# Patient Record
Sex: FEMALE | Race: WHITE | NOT HISPANIC OR LATINO | Employment: OTHER | URBAN - NONMETROPOLITAN AREA
[De-identification: names, ages, dates, MRNs, and addresses within clinical notes are randomized per-mention and may not be internally consistent; named-entity substitution may affect disease eponyms.]

---

## 2017-01-09 ENCOUNTER — HOSPITAL ENCOUNTER (OUTPATIENT)
Dept: GENERAL RADIOLOGY | Facility: HOSPITAL | Age: 52
Discharge: HOME | End: 2017-01-09
Attending: INTERNAL MEDICINE

## 2017-01-09 ENCOUNTER — OFFICE VISIT (OUTPATIENT)
Dept: FAMILY MEDICINE CLINIC | Facility: CLINIC | Age: 52
End: 2017-01-09

## 2017-01-09 VITALS
TEMPERATURE: 98.1 F | SYSTOLIC BLOOD PRESSURE: 106 MMHG | OXYGEN SATURATION: 100 % | RESPIRATION RATE: 15 BRPM | DIASTOLIC BLOOD PRESSURE: 64 MMHG | BODY MASS INDEX: 22.53 KG/M2 | WEIGHT: 131.25 LBS | HEART RATE: 68 BPM

## 2017-01-09 DIAGNOSIS — R73.01 IMPAIRED FASTING GLUCOSE: Primary | ICD-10-CM

## 2017-01-09 DIAGNOSIS — R53.83 MALAISE AND FATIGUE: ICD-10-CM

## 2017-01-09 DIAGNOSIS — G25.81 RESTLESS LEG SYNDROME: ICD-10-CM

## 2017-01-09 DIAGNOSIS — R53.81 MALAISE AND FATIGUE: ICD-10-CM

## 2017-01-09 DIAGNOSIS — F41.9 ANXIETY: ICD-10-CM

## 2017-01-09 DIAGNOSIS — E53.8 VITAMIN B12 DEFICIENCY: ICD-10-CM

## 2017-01-09 DIAGNOSIS — F33.0 MILD EPISODE OF RECURRENT MAJOR DEPRESSIVE DISORDER (HCC): ICD-10-CM

## 2017-01-09 DIAGNOSIS — D51.0 PERNICIOUS ANEMIA: ICD-10-CM

## 2017-01-09 LAB
ALBUMIN SERPL-MCNC: 4.3 G/DL (ref 3.2–4.8)
ALBUMIN/GLOB SERPL: 1.5 G/DL (ref 1.5–2.5)
ALP SERPL-CCNC: 51 U/L (ref 25–100)
ALT SERPL W P-5'-P-CCNC: 12 U/L (ref 7–40)
ANION GAP SERPL CALCULATED.3IONS-SCNC: 10 MMOL/L (ref 3–11)
AST SERPL-CCNC: 20 U/L (ref 0–33)
BASOPHILS # BLD AUTO: 0.01 10*3/MM3 (ref 0–0.2)
BASOPHILS NFR BLD AUTO: 0.2 % (ref 0–1)
BILIRUB SERPL-MCNC: 0.2 MG/DL (ref 0.3–1.2)
BUN BLD-MCNC: 22 MG/DL (ref 9–23)
BUN/CREAT SERPL: 31.4 (ref 7–25)
CALCIUM SPEC-SCNC: 9.8 MG/DL (ref 8.7–10.4)
CHLORIDE SERPL-SCNC: 106 MMOL/L (ref 99–109)
CO2 SERPL-SCNC: 26 MMOL/L (ref 20–31)
CREAT BLD-MCNC: 0.7 MG/DL (ref 0.6–1.3)
DEPRECATED RDW RBC AUTO: 45.3 FL (ref 37–54)
EOSINOPHIL # BLD AUTO: 0.19 10*3/MM3 (ref 0.1–0.3)
EOSINOPHIL NFR BLD AUTO: 4.1 % (ref 0–3)
ERYTHROCYTE [DISTWIDTH] IN BLOOD BY AUTOMATED COUNT: 12.8 % (ref 11.3–14.5)
GFR SERPL CREATININE-BSD FRML MDRD: 89 ML/MIN/1.73
GLOBULIN UR ELPH-MCNC: 2.8 GM/DL
GLUCOSE BLD-MCNC: 88 MG/DL (ref 70–100)
HBA1C MFR BLD: 5.2 % (ref 4.8–5.6)
HCT VFR BLD AUTO: 37.5 % (ref 34.5–44)
HGB BLD-MCNC: 12.9 G/DL (ref 11.5–15.5)
IMM GRANULOCYTES # BLD: 0.01 10*3/MM3 (ref 0–0.03)
IMM GRANULOCYTES NFR BLD: 0.2 % (ref 0–0.6)
LYMPHOCYTES # BLD AUTO: 1.3 10*3/MM3 (ref 0.6–4.8)
LYMPHOCYTES NFR BLD AUTO: 28 % (ref 24–44)
MCH RBC QN AUTO: 33 PG (ref 27–31)
MCHC RBC AUTO-ENTMCNC: 34.4 G/DL (ref 32–36)
MCV RBC AUTO: 95.9 FL (ref 80–99)
MONOCYTES # BLD AUTO: 0.37 10*3/MM3 (ref 0–1)
MONOCYTES NFR BLD AUTO: 8 % (ref 0–12)
NEUTROPHILS # BLD AUTO: 2.76 10*3/MM3 (ref 1.5–8.3)
NEUTROPHILS NFR BLD AUTO: 59.5 % (ref 41–71)
PLATELET # BLD AUTO: 250 10*3/MM3 (ref 150–450)
PMV BLD AUTO: 10.4 FL (ref 6–12)
POTASSIUM BLD-SCNC: 4.7 MMOL/L (ref 3.5–5.5)
PROT SERPL-MCNC: 7.1 G/DL (ref 5.7–8.2)
RBC # BLD AUTO: 3.91 10*6/MM3 (ref 3.89–5.14)
SODIUM BLD-SCNC: 142 MMOL/L (ref 132–146)
TSH SERPL DL<=0.05 MIU/L-ACNC: 1.44 MIU/ML (ref 0.35–5.35)
VIT B12 BLD-MCNC: 449 PG/ML (ref 211–911)
WBC NRBC COR # BLD: 4.64 10*3/MM3 (ref 3.5–10.8)

## 2017-01-09 PROCEDURE — 82607 VITAMIN B-12: CPT | Performed by: INTERNAL MEDICINE

## 2017-01-09 PROCEDURE — 80053 COMPREHEN METABOLIC PANEL: CPT | Performed by: INTERNAL MEDICINE

## 2017-01-09 PROCEDURE — 83036 HEMOGLOBIN GLYCOSYLATED A1C: CPT | Performed by: INTERNAL MEDICINE

## 2017-01-09 PROCEDURE — 85025 COMPLETE CBC W/AUTO DIFF WBC: CPT | Performed by: INTERNAL MEDICINE

## 2017-01-09 PROCEDURE — 99213 OFFICE O/P EST LOW 20 MIN: CPT | Performed by: INTERNAL MEDICINE

## 2017-01-09 PROCEDURE — 96372 THER/PROPH/DIAG INJ SC/IM: CPT | Performed by: INTERNAL MEDICINE

## 2017-01-09 PROCEDURE — 84443 ASSAY THYROID STIM HORMONE: CPT | Performed by: INTERNAL MEDICINE

## 2017-01-09 RX ORDER — PRAMIPEXOLE DIHYDROCHLORIDE 1 MG/1
1 TABLET ORAL NIGHTLY
Qty: 90 TABLET | Refills: 3 | Status: SHIPPED | OUTPATIENT
Start: 2017-01-09 | End: 2017-05-31

## 2017-01-09 RX ADMIN — CYANOCOBALAMIN 1000 MCG: 1000 INJECTION, SOLUTION INTRAMUSCULAR; SUBCUTANEOUS at 09:52

## 2017-01-09 NOTE — MR AVS SNAPSHOT
Ernestina Orellana Mindiughenhauesteban   1/9/2017 8:45 AM   Office Visit    Dept Phone:  [phone]   Encounter #:  [id number]    Provider:  Uma Anne MD   Department:  [facility]                Your Full Care Plan              Today's Medication Changes          These changes are accurate as of: 1/9/17 11:08 AM.  If you have any questions, ask your nurse or doctor.               Medication(s)that have changed:     pramipexole 1 MG tablet   Commonly known as:  MIRAPEX   Take 1 tablet by mouth Every Night.   What changed:    - medication strength  - how much to take   Changed by:  Uma Anne MD            Where to Get Your Medications      These medications were sent to [facility] [place] [address] - [address] [facility] - [phone]  - [phone] [address] [address]     Phone:  [phone]     pramipexole 1 MG tablet                  Your Updated Medication List          This list is accurate as of: 1/9/17 11:08 AM.  Always use your most recent med list.                FLUoxetine 40 MG capsule   Commonly known as:  PROzac   Take 1 capsule by mouth Daily.       pramipexole 1 MG tablet   Commonly known as:  MIRAPEX   Take 1 tablet by mouth Every Night.               We Performed the Following     CBC & Differential     CBC Auto Differential     Comprehensive Metabolic Panel     Hemoglobin A1c     TSH     Vitamin B12       You Were Diagnosed With        Codes Comments    Impaired fasting glucose    -  Primary ICD-10-CM: R73.01  ICD-9-CM: 790.21     Malaise and fatigue     ICD-10-CM: R53.81, R53.83  ICD-9-CM: 780.79     Restless leg syndrome     ICD-10-CM: G25.81  ICD-9-CM: 333.94     Pernicious anemia     ICD-10-CM: D51.0  ICD-9-CM: 281.0     Vitamin B12 deficiency     ICD-10-CM: E53.8  ICD-9-CM: 266.2       Medications to be Given to You by a Medical Professional     Due       Frequency    (none) cyanocobalamin  injection 1,000 mcg  Every 28 Days      Instructions     None    Patient Instructions History      Upcoming Appointments     Visit Type Date Time Department    OFFICE VISIT 1/9/2017  8:45 AM E  [facility]    PHYSICAL 11/9/2017  1:00 PM [facility]LL[facility]      MyChart Signup     Our records indicate that your [facility] [employer] account has been deactivated. If you would like to reactivate your account, please email [email or web address] or call [phone] to talk to our [employer] staff.             Other Info from Your Visit           Your Appointments     Nov 09, 2017  1:00 PM EST   Physical with Uma Anne MD   [facility] (--)    [address] [address] [phone]           Arrive 15 minutes prior to appointment.              Allergies     Buspar [Buspirone]      Compazine [Prochlorperazine]        Reason for Visit     Anxiety     Depression     Restless Legs Syndrome           Vital Signs     Blood Pressure Pulse Temperature Respirations Weight Oxygen Saturation    106/64 68 98.1 °F (36.7 °C) (Oral) 15 131 lb 4 oz (59.5 kg) 100%    Body Mass Index Smoking Status                22.53 kg/m2 Never Smoker          Problems and Diagnoses Noted     Pernicious anemia    Vitamin B12 deficiency    Increased fasting blood sugar    -  Primary    Malaise and fatigue        Restless leg syndrome          Medications Administered     cyanocobalamin injection 1,000 mcg                    Results

## 2017-01-09 NOTE — PROGRESS NOTES
Subjective   Ernestina Medina is a 51 y.o. female.     Chief Complaint   Patient presents with   • Anxiety   • Depression   • Restless Legs Syndrome       History of Present Illness   Patient is here to follow-up on her sugar   She is fasting for lab work.  She is also to follow-up on anxiety and depression.  She is taking medications as prescribed and does helped her with the symptoms.  Patient is also complains of fatigue.  She is also here to get a B12 shot for her pernicious anemia.  Patient is also here complaining of restless legs.  She was put on Mirapex and is helping her, but she would like the dose to be increased    The following portions of the patient's history were reviewed and updated as appropriate: allergies, current medications, past family history, past medical history, past social history, past surgical history and problem list.    Review of Systems   Constitutional: Positive for fatigue. Negative for appetite change and fever.   HENT: Negative for congestion, ear discharge, ear pain, sinus pressure and sore throat.    Eyes: Negative for pain and discharge.   Respiratory: Negative for cough, chest tightness, shortness of breath and wheezing.    Cardiovascular: Negative for chest pain, palpitations and leg swelling.   Gastrointestinal: Negative for abdominal pain, blood in stool, constipation, diarrhea and nausea.   Endocrine: Negative for cold intolerance and heat intolerance.   Genitourinary: Negative for dysuria, flank pain and frequency.   Musculoskeletal: Negative for back pain and joint swelling.   Skin: Negative for color change.   Allergic/Immunologic: Negative for environmental allergies and food allergies.   Neurological: Negative for dizziness, weakness, numbness and headaches.        Restless legs   Hematological: Negative for adenopathy. Does not bruise/bleed easily.   Psychiatric/Behavioral: Negative for behavioral problems and dysphoric mood. The patient is not  nervous/anxious.         Stable anxiety and depression         Current Outpatient Prescriptions:   •  FLUoxetine (PROzac) 40 MG capsule, Take 1 capsule by mouth Daily., Disp: 90 capsule, Rfl: 3  •  pramipexole (MIRAPEX) 1 MG tablet, Take 1 tablet by mouth Every Night., Disp: 90 tablet, Rfl: 3    Current Facility-Administered Medications:   •  cyanocobalamin injection 1,000 mcg, 1,000 mcg, Intramuscular, Q28 Days, Uma Anne MD, 1,000 mcg at 01/09/17 0952    Objective     Blood pressure 106/64, pulse 68, temperature 98.1 °F (36.7 °C), temperature source Oral, resp. rate 15, weight 131 lb 4 oz (59.5 kg), SpO2 100 %.    Physical Exam   Constitutional: She is oriented to person, place, and time. She appears well-developed and well-nourished. No distress.   HENT:   Head: Normocephalic and atraumatic.   Right Ear: External ear normal.   Left Ear: External ear normal.   Nose: Nose normal.   Mouth/Throat: Oropharynx is clear and moist.   Eyes: Conjunctivae and EOM are normal. Pupils are equal, round, and reactive to light.   Neck: Neck supple. No thyromegaly present.   Cardiovascular: Normal rate, regular rhythm and normal heart sounds.    Pulmonary/Chest: Effort normal and breath sounds normal. No respiratory distress.   Abdominal: Soft. Bowel sounds are normal. She exhibits no distension. There is no tenderness. There is no rebound.   Musculoskeletal: Normal range of motion. She exhibits no edema.   Lymphadenopathy:     She has no cervical adenopathy.   Neurological: She is alert and oriented to person, place, and time.   No gross motor or sensory deficits   Skin: Skin is warm. She is not diaphoretic.   Psychiatric: She has a normal mood and affect.   Nursing note and vitals reviewed.      Results for orders placed or performed in visit on 01/09/17   Comprehensive Metabolic Panel   Result Value Ref Range    Glucose 88 70 - 100 mg/dL    BUN 22 9 - 23 mg/dL    Creatinine 0.70 0.60 - 1.30 mg/dL    Sodium 142 132 - 146  mmol/L    Potassium 4.7 3.5 - 5.5 mmol/L    Chloride 106 99 - 109 mmol/L    CO2 26.0 20.0 - 31.0 mmol/L    Calcium 9.8 8.7 - 10.4 mg/dL    Total Protein 7.1 5.7 - 8.2 g/dL    Albumin 4.30 3.20 - 4.80 g/dL    ALT (SGPT) 12 7 - 40 U/L    AST (SGOT) 20 0 - 33 U/L    Alkaline Phosphatase 51 25 - 100 U/L    Total Bilirubin 0.2 (L) 0.3 - 1.2 mg/dL    eGFR Non African Amer 89 >60 mL/min/1.73    Globulin 2.8 gm/dL    A/G Ratio 1.5 1.5 - 2.5 g/dL    BUN/Creatinine Ratio 31.4 (H) 7.0 - 25.0    Anion Gap 10.0 3.0 - 11.0 mmol/L   TSH   Result Value Ref Range    TSH 1.444 0.350 - 5.350 mIU/mL   Hemoglobin A1c   Result Value Ref Range    Hemoglobin A1C 5.20 4.80 - 5.60 %   Vitamin B12   Result Value Ref Range    Vitamin B-12 449 211 - 911 pg/mL   CBC Auto Differential   Result Value Ref Range    WBC 4.64 3.50 - 10.80 10*3/mm3    RBC 3.91 3.89 - 5.14 10*6/mm3    Hemoglobin 12.9 11.5 - 15.5 g/dL    Hematocrit 37.5 34.5 - 44.0 %    MCV 95.9 80.0 - 99.0 fL    MCH 33.0 (H) 27.0 - 31.0 pg    MCHC 34.4 32.0 - 36.0 g/dL    RDW 12.8 11.3 - 14.5 %    RDW-SD 45.3 37.0 - 54.0 fl    MPV 10.4 6.0 - 12.0 fL    Platelets 250 150 - 450 10*3/mm3    Neutrophil % 59.5 41.0 - 71.0 %    Lymphocyte % 28.0 24.0 - 44.0 %    Monocyte % 8.0 0.0 - 12.0 %    Eosinophil % 4.1 (H) 0.0 - 3.0 %    Basophil % 0.2 0.0 - 1.0 %    Immature Grans % 0.2 0.0 - 0.6 %    Neutrophils, Absolute 2.76 1.50 - 8.30 10*3/mm3    Lymphocytes, Absolute 1.30 0.60 - 4.80 10*3/mm3    Monocytes, Absolute 0.37 0.00 - 1.00 10*3/mm3    Eosinophils, Absolute 0.19 0.10 - 0.30 10*3/mm3    Basophils, Absolute 0.01 0.00 - 0.20 10*3/mm3    Immature Grans, Absolute 0.01 0.00 - 0.03 10*3/mm3         Assessment/Plan   Ernestina was seen today for anxiety, depression and restless legs syndrome.    Diagnoses and all orders for this visit:    Impaired fasting glucose  -     Hemoglobin A1c    Anxiety    Mild episode of recurrent major depressive disorder    Malaise and fatigue  -     CBC &  Differential  -     Comprehensive Metabolic Panel  -     TSH  -     CBC Auto Differential  -     Scan Slide; Future  -     Cancel: Scan Slide    Restless leg syndrome    Pernicious anemia    Vitamin B12 deficiency  -     Vitamin B12    Other orders  -     pramipexole (MIRAPEX) 1 MG tablet; Take 1 tablet by mouth Every Night.          Plan:  1.restless leg syndrome: Will give a trial with Mirapex 1 mg po qhs. Labs to be obtained today  2.anxiety disorder: We will continue Prozac.  3.major depression: Will continue current medication  4. Pernicious anemia: continue b12 im, ,given today  5. Fatigue: will get labs  6. impaired glucose: will get labs         Uma Anne MD

## 2017-03-20 ENCOUNTER — OFFICE VISIT (OUTPATIENT)
Dept: FAMILY MEDICINE CLINIC | Facility: CLINIC | Age: 52
End: 2017-03-20

## 2017-03-20 VITALS
WEIGHT: 126 LBS | DIASTOLIC BLOOD PRESSURE: 81 MMHG | BODY MASS INDEX: 21.63 KG/M2 | TEMPERATURE: 97.9 F | SYSTOLIC BLOOD PRESSURE: 132 MMHG | HEART RATE: 61 BPM | RESPIRATION RATE: 16 BRPM | OXYGEN SATURATION: 100 %

## 2017-03-20 DIAGNOSIS — R21 RASH AND NONSPECIFIC SKIN ERUPTION: ICD-10-CM

## 2017-03-20 DIAGNOSIS — R51.9 PERSISTENT HEADACHES: ICD-10-CM

## 2017-03-20 DIAGNOSIS — E78.2 MIXED HYPERLIPIDEMIA: ICD-10-CM

## 2017-03-20 DIAGNOSIS — M25.50 ARTHRALGIA OF MULTIPLE JOINTS: ICD-10-CM

## 2017-03-20 DIAGNOSIS — R10.84 GENERALIZED ABDOMINAL PAIN: ICD-10-CM

## 2017-03-20 DIAGNOSIS — R11.12 PROJECTILE VOMITING WITH NAUSEA: Primary | ICD-10-CM

## 2017-03-20 DIAGNOSIS — R25.3 MUSCLE FASCICULATION: ICD-10-CM

## 2017-03-20 LAB
ALBUMIN SERPL-MCNC: 4.7 G/DL (ref 3.5–5)
ALBUMIN/GLOB SERPL: 1.5 G/DL (ref 1–2)
ALP SERPL-CCNC: 59 U/L (ref 38–126)
ALT SERPL-CCNC: 28 U/L (ref 13–69)
AMYLASE SERPL-CCNC: 64 U/L (ref 30–110)
AST SERPL-CCNC: 27 U/L (ref 15–46)
BASOPHILS # BLD AUTO: 0.01 10*3/MM3 (ref 0–0.2)
BASOPHILS NFR BLD AUTO: 0.2 % (ref 0–2.5)
BILIRUB SERPL-MCNC: 0.7 MG/DL (ref 0.2–1.3)
BUN SERPL-MCNC: 15 MG/DL (ref 7–20)
BUN/CREAT SERPL: 25 (ref 7.1–23.5)
CALCIUM SERPL-MCNC: 9.7 MG/DL (ref 8.4–10.2)
CHLORIDE SERPL-SCNC: 106 MMOL/L (ref 98–107)
CO2 SERPL-SCNC: 25 MMOL/L (ref 26–30)
CREAT SERPL-MCNC: 0.6 MG/DL (ref 0.6–1.3)
EOSINOPHIL # BLD AUTO: 0.05 10*3/MM3 (ref 0–0.7)
EOSINOPHIL NFR BLD AUTO: 1.1 % (ref 0–7)
ERYTHROCYTE [DISTWIDTH] IN BLOOD BY AUTOMATED COUNT: 12.8 % (ref 11.5–14.5)
GLOBULIN SER CALC-MCNC: 3.1 GM/DL
GLUCOSE SERPL-MCNC: 93 MG/DL (ref 74–98)
HCT VFR BLD AUTO: 39.8 % (ref 37–47)
HGB BLD-MCNC: 13.1 G/DL (ref 12–16)
IMM GRANULOCYTES # BLD: 0.01 10*3/MM3 (ref 0–0.06)
IMM GRANULOCYTES NFR BLD: 0.2 % (ref 0–0.6)
LIPASE SERPL-CCNC: 107 U/L (ref 23–300)
LYMPHOCYTES # BLD AUTO: 1.72 10*3/MM3 (ref 0.6–3.4)
LYMPHOCYTES NFR BLD AUTO: 37.1 % (ref 10–50)
MCH RBC QN AUTO: 31.6 PG (ref 27–31)
MCHC RBC AUTO-ENTMCNC: 32.9 G/DL (ref 30–37)
MCV RBC AUTO: 95.9 FL (ref 81–99)
MONOCYTES # BLD AUTO: 0.37 10*3/MM3 (ref 0–0.9)
MONOCYTES NFR BLD AUTO: 8 % (ref 0–12)
NEUTROPHILS # BLD AUTO: 2.47 10*3/MM3 (ref 2–6.9)
NEUTROPHILS NFR BLD AUTO: 53.4 % (ref 37–80)
NRBC BLD AUTO-RTO: 0 /100 WBC (ref 0–0)
PLATELET # BLD AUTO: 256 10*3/MM3 (ref 130–400)
POTASSIUM SERPL-SCNC: 4.4 MMOL/L (ref 3.5–5.1)
PROT SERPL-MCNC: 7.8 G/DL (ref 6.3–8.2)
RBC # BLD AUTO: 4.15 10*6/MM3 (ref 4.2–5.4)
SODIUM SERPL-SCNC: 141 MMOL/L (ref 137–145)
TSH SERPL DL<=0.005 MIU/L-ACNC: 1.26 MIU/ML (ref 0.47–4.68)
WBC # BLD AUTO: 4.63 10*3/MM3 (ref 4.8–10.8)

## 2017-03-20 PROCEDURE — 99214 OFFICE O/P EST MOD 30 MIN: CPT | Performed by: INTERNAL MEDICINE

## 2017-03-20 RX ORDER — ONDANSETRON 4 MG/1
4 TABLET, FILM COATED ORAL EVERY 8 HOURS PRN
Qty: 30 TABLET | Refills: 1 | Status: SHIPPED | OUTPATIENT
Start: 2017-03-20 | End: 2017-04-19

## 2017-03-20 RX ORDER — PANTOPRAZOLE SODIUM 40 MG/1
40 TABLET, DELAYED RELEASE ORAL DAILY
Qty: 30 TABLET | Refills: 6 | Status: SHIPPED | OUTPATIENT
Start: 2017-03-20 | End: 2017-04-19

## 2017-03-20 RX ORDER — BACLOFEN 10 MG/1
10 TABLET ORAL NIGHTLY PRN
Qty: 30 TABLET | Refills: 3 | Status: SHIPPED | OUTPATIENT
Start: 2017-03-20 | End: 2017-11-09

## 2017-03-20 NOTE — PROGRESS NOTES
Subjective   Ernestina Medina is a 52 y.o. female.     Chief Complaint   Patient presents with   • Headache     Patient complains of constant headaches.    • Rash     Patient complains of a rash on her back as well.    • Edema       History of Present Illness    she complains of mouth ulcers non painful on march10th with lesion on her back like 'water blisters' , she complains of nausea with vomiting on march 13 - 14th and it woke her up from her sleep , she also had swelling and pain in her left wrist, she also complains of body ache, headache, jolting of her body and sensitivity of her upper arm area , she compains  Of a diffuse rash in the forearm with numbness of the area, she states she feels weak and breaks out in a sweat before her headache and nausea and then later feels better when the symptoms go away     The following portions of the patient's history were reviewed and updated as appropriate: allergies, current medications, past family history, past medical history, past social history, past surgical history and problem list.    Review of Systems   Constitutional: Negative for appetite change, fatigue and fever.   HENT: Negative for congestion, ear discharge, ear pain, sinus pressure and sore throat.    Eyes: Negative for pain and discharge.   Respiratory: Negative for cough, chest tightness, shortness of breath and wheezing.    Cardiovascular: Negative for chest pain, palpitations and leg swelling.   Gastrointestinal: Positive for abdominal pain, nausea and vomiting. Negative for blood in stool, constipation and diarrhea.   Endocrine: Negative for cold intolerance and heat intolerance.   Genitourinary: Negative for dysuria, flank pain and frequency.   Musculoskeletal: Negative for back pain and joint swelling.        Muscle fasiculation   Skin: Positive for rash. Negative for color change.   Allergic/Immunologic: Negative for environmental allergies and food allergies.   Neurological: Positive  for headaches. Negative for dizziness, weakness and numbness.   Hematological: Negative for adenopathy. Does not bruise/bleed easily.   Psychiatric/Behavioral: Negative for behavioral problems and dysphoric mood. The patient is not nervous/anxious.          Current Outpatient Prescriptions:   •  FLUoxetine (PROzac) 40 MG capsule, Take 1 capsule by mouth Daily., Disp: 90 capsule, Rfl: 3  •  pramipexole (MIRAPEX) 1 MG tablet, Take 1 tablet by mouth Every Night., Disp: 90 tablet, Rfl: 3  •  baclofen (LIORESAL) 10 MG tablet, Take 1 tablet by mouth At Night As Needed for Muscle Spasms., Disp: 30 tablet, Rfl: 3  •  ondansetron (ZOFRAN) 4 MG tablet, Take 1 tablet by mouth Every 8 (Eight) Hours As Needed for Nausea or Vomiting for up to 30 days., Disp: 30 tablet, Rfl: 1  •  pantoprazole (PROTONIX) 40 MG EC tablet, Take 1 tablet by mouth Daily for 30 days. Half hour prior  To breakfast, Disp: 30 tablet, Rfl: 6    Current Facility-Administered Medications:   •  cyanocobalamin injection 1,000 mcg, 1,000 mcg, Intramuscular, Q28 Days, Uma Anne MD, 1,000 mcg at 01/09/17 0952    Objective     Blood pressure 132/81, pulse 61, temperature 97.9 °F (36.6 °C), temperature source Oral, resp. rate 16, weight 126 lb (57.2 kg), SpO2 100 %.    Physical Exam   Constitutional: She is oriented to person, place, and time. She appears well-developed and well-nourished. No distress.   HENT:   Head: Normocephalic and atraumatic.   Right Ear: External ear normal.   Left Ear: External ear normal.   Nose: Nose normal.   Mouth/Throat: Oropharynx is clear and moist.   Eyes: Conjunctivae and EOM are normal. Pupils are equal, round, and reactive to light.   Neck: Neck supple. No thyromegaly present.   Cardiovascular: Normal rate, regular rhythm and normal heart sounds.    Pulmonary/Chest: Effort normal and breath sounds normal. No respiratory distress.   Abdominal: Soft. Bowel sounds are normal. She exhibits no distension. There is no tenderness.  There is no rebound.   Musculoskeletal: Normal range of motion. She exhibits no edema.   Lymphadenopathy:     She has no cervical adenopathy.   Neurological: She is alert and oriented to person, place, and time.   No gross motor or sensory deficits   Skin: Skin is warm. She is not diaphoretic.   Psychiatric: She has a normal mood and affect.   Nursing note and vitals reviewed.      Results for orders placed or performed in visit on 03/20/17   Comprehensive Metabolic Panel   Result Value Ref Range    Glucose 93 74 - 98 mg/dL    BUN 15 7 - 20 mg/dL    Creatinine 0.60 0.60 - 1.30 mg/dL    eGFR Non African Am 106 >60 mL/min/1.73    eGFR African Am 128 >60 mL/min/1.73    BUN/Creatinine Ratio 25.0 (H) 7.1 - 23.5    Sodium 141 137 - 145 mmol/L    Potassium 4.4 3.5 - 5.1 mmol/L    Chloride 106 98 - 107 mmol/L    Total CO2 25.0 (L) 26.0 - 30.0 mmol/L    Calcium 9.7 8.4 - 10.2 mg/dL    Total Protein 7.8 6.3 - 8.2 g/dL    Albumin 4.70 3.50 - 5.00 g/dL    Globulin 3.1 gm/dL    A/G Ratio 1.5 1.0 - 2.0 g/dL    Total Bilirubin 0.7 0.2 - 1.3 mg/dL    Alkaline Phosphatase 59 38 - 126 U/L    AST (SGOT) 27 15 - 46 U/L    ALT (SGPT) 28 13 - 69 U/L   TSH   Result Value Ref Range    TSH 1.260 0.470 - 4.680 mIU/mL   Amylase   Result Value Ref Range    Amylase 64 30 - 110 U/L   Lipase   Result Value Ref Range    Lipase 107 23 - 300 U/L   CBC & Differential   Result Value Ref Range    WBC 4.63 (L) 4.80 - 10.80 10*3/mm3    RBC 4.15 (L) 4.20 - 5.40 10*6/mm3    Hemoglobin 13.1 12.0 - 16.0 g/dL    Hematocrit 39.8 37.0 - 47.0 %    MCV 95.9 81.0 - 99.0 fL    MCH 31.6 (H) 27.0 - 31.0 pg    MCHC 32.9 30.0 - 37.0 g/dL    RDW 12.8 11.5 - 14.5 %    Platelets 256 130 - 400 10*3/mm3    Neutrophil Rel % 53.4 37.0 - 80.0 %    Lymphocyte Rel % 37.1 10.0 - 50.0 %    Monocyte Rel % 8.0 0.0 - 12.0 %    Eosinophil Rel % 1.1 0.0 - 7.0 %    Basophil Rel % 0.2 0.0 - 2.5 %    Neutrophils Absolute 2.47 2.00 - 6.90 10*3/mm3    Lymphocytes Absolute 1.72 0.60 - 3.40  10*3/mm3    Monocytes Absolute 0.37 0.00 - 0.90 10*3/mm3    Eosinophils Absolute 0.05 0.00 - 0.70 10*3/mm3    Basophils Absolute 0.01 0.00 - 0.20 10*3/mm3    Immature Granulocyte Rel % 0.2 0.0 - 0.6 %    Immature Grans Absolute 0.01 0.00 - 0.06 10*3/mm3    nRBC 0.0 0.0 - 0.0 /100 WBC         Assessment/Plan   Ernestina was seen today for headache, rash and edema.    Diagnoses and all orders for this visit:    Projectile vomiting with nausea  -     CBC & Differential  -     Comprehensive Metabolic Panel  -     TSH  -     Amylase  -     Lipase  -     CT Abdomen Pelvis With Contrast; Future  -     Ambulatory Referral to Gastroenterology    Generalized abdominal pain  -     CT Abdomen Pelvis With Contrast; Future  -     Ambulatory Referral to Gastroenterology    Muscle fasciculation  -     Ambulatory Referral to Neurology    Arthralgia of multiple joints  -     Ambulatory Referral to Rheumatology    Mixed hyperlipidemia  -     CBC & Differential; Standing  -     Comprehensive Metabolic Panel; Standing  -     Lipid Panel; Standing    Rash and nonspecific skin eruption    Persistent headaches  -     CT Head Without Contrast; Future    Other orders  -     pantoprazole (PROTONIX) 40 MG EC tablet; Take 1 tablet by mouth Daily for 30 days. Half hour prior  To breakfast  -     ondansetron (ZOFRAN) 4 MG tablet; Take 1 tablet by mouth Every 8 (Eight) Hours As Needed for Nausea or Vomiting for up to 30 days.  -     baclofen (LIORESAL) 10 MG tablet; Take 1 tablet by mouth At Night As Needed for Muscle Spasms.    plan:  1.  Abdominal pain: We will obtain CAT scan and lab work.  2.  Nausea and vomiting.  We will start antiemetics, oral hydration advised  3.  Muscle fasiculation: We will obtain lab work and refer patient to neurology ,  trial with muscle relaxant  4.  Multiple joint pain: Will refer patient to rheumatology  5.  Mixed hyperlipidemia: We will obtain fasting lipid panel  6.. Rash: unknown etiology: We will obtain labs  7.   Headache:  Will refer patient to neurology for further evaluation , we will obtain CAT scan             Uma Anne MD

## 2017-03-29 ENCOUNTER — HOSPITAL ENCOUNTER (OUTPATIENT)
Dept: CT IMAGING | Facility: HOSPITAL | Age: 52
Discharge: HOME | End: 2017-03-29

## 2017-03-29 ENCOUNTER — HOSPITAL ENCOUNTER (OUTPATIENT)
Dept: CT IMAGING | Facility: HOSPITAL | Age: 52
Discharge: HOME | End: 2017-03-29
Admitting: INTERNAL MEDICINE

## 2017-03-29 DIAGNOSIS — R11.12 PROJECTILE VOMITING WITH NAUSEA: ICD-10-CM

## 2017-03-29 DIAGNOSIS — R51.9 PERSISTENT HEADACHES: ICD-10-CM

## 2017-03-29 DIAGNOSIS — R10.84 GENERALIZED ABDOMINAL PAIN: ICD-10-CM

## 2017-03-29 PROCEDURE — 70450 CT HEAD/BRAIN W/O DYE: CPT

## 2017-03-29 PROCEDURE — 74177 CT ABD & PELVIS W/CONTRAST: CPT

## 2017-03-29 PROCEDURE — 0 DIATRIZOATE MEGLUMINE & SODIUM PER 1 ML: Performed by: INTERNAL MEDICINE

## 2017-03-29 PROCEDURE — 0 IOPAMIDOL 61 % SOLUTION: Performed by: INTERNAL MEDICINE

## 2017-03-29 RX ADMIN — IOPAMIDOL 100 ML: 612 INJECTION, SOLUTION INTRAVENOUS at 11:45

## 2017-03-29 RX ADMIN — DIATRIZOATE MEGLUMINE AND DIATRIZOATE SODIUM 30 ML: 660; 100 LIQUID ORAL; RECTAL at 11:45

## 2017-04-28 ENCOUNTER — TELEPHONE (OUTPATIENT)
Dept: FAMILY MEDICINE CLINIC | Facility: CLINIC | Age: 52
End: 2017-04-28

## 2017-04-28 ENCOUNTER — APPOINTMENT (OUTPATIENT)
Dept: GENERAL RADIOLOGY | Facility: HOSPITAL | Age: 52
End: 2017-04-28

## 2017-04-28 ENCOUNTER — HOSPITAL ENCOUNTER (EMERGENCY)
Facility: HOSPITAL | Age: 52
Discharge: HOME | End: 2017-04-28
Attending: EMERGENCY MEDICINE | Admitting: EMERGENCY MEDICINE

## 2017-04-28 VITALS
OXYGEN SATURATION: 99 % | WEIGHT: 120 LBS | HEIGHT: 64 IN | HEART RATE: 62 BPM | TEMPERATURE: 97.8 F | DIASTOLIC BLOOD PRESSURE: 74 MMHG | SYSTOLIC BLOOD PRESSURE: 138 MMHG | BODY MASS INDEX: 20.49 KG/M2 | RESPIRATION RATE: 18 BRPM

## 2017-04-28 DIAGNOSIS — M79.641 RIGHT HAND PAIN: Primary | ICD-10-CM

## 2017-04-28 DIAGNOSIS — S62.664A CLOSED NONDISPLACED FRACTURE OF DISTAL PHALANX OF RIGHT RING FINGER, INITIAL ENCOUNTER: Primary | ICD-10-CM

## 2017-04-28 PROCEDURE — 99283 EMERGENCY DEPT VISIT LOW MDM: CPT

## 2017-04-28 PROCEDURE — 73130 X-RAY EXAM OF HAND: CPT

## 2017-04-28 RX ORDER — HYDROCODONE BITARTRATE AND ACETAMINOPHEN 5; 325 MG/1; MG/1
1 TABLET ORAL EVERY 6 HOURS PRN
Qty: 8 TABLET | Refills: 0 | Status: SHIPPED | OUTPATIENT
Start: 2017-04-28 | End: 2017-05-10

## 2017-04-28 RX ORDER — ACETAMINOPHEN 325 MG/1
650 TABLET ORAL ONCE
Status: COMPLETED | OUTPATIENT
Start: 2017-04-28 | End: 2017-04-28

## 2017-04-28 RX ADMIN — ACETAMINOPHEN 650 MG: 325 TABLET, FILM COATED ORAL at 10:53

## 2017-04-28 NOTE — TELEPHONE ENCOUNTER
Pt does not need appt for referral, putting in per leopoldo.     ----- Message from Caitlin Orellana sent at 4/28/2017  2:14 PM EDT -----  PATIENT WAS TREATED IN ER FOR BROKEN FINGER AND WAS ADVISED TO F/U WITH DR KILGORE BUT INS REQUIRES REFERRAL FROM PCP     NO APPTS AVAILABLE; PLEASE ENTER ORDER FOR DR KILGORE    IF ANY QUESTIONS CALL PATIENT [phone]

## 2017-05-05 ENCOUNTER — TELEPHONE (OUTPATIENT)
Dept: FAMILY MEDICINE CLINIC | Facility: CLINIC | Age: 52
End: 2017-05-05

## 2017-05-10 ENCOUNTER — PREP FOR SURGERY (OUTPATIENT)
Dept: GASTROENTEROLOGY | Facility: CLINIC | Age: 52
End: 2017-05-10

## 2017-05-10 ENCOUNTER — OFFICE VISIT (OUTPATIENT)
Dept: GASTROENTEROLOGY | Facility: CLINIC | Age: 52
End: 2017-05-10

## 2017-05-10 VITALS
WEIGHT: 130 LBS | BODY MASS INDEX: 22.2 KG/M2 | DIASTOLIC BLOOD PRESSURE: 73 MMHG | HEIGHT: 64 IN | SYSTOLIC BLOOD PRESSURE: 113 MMHG | HEART RATE: 61 BPM | RESPIRATION RATE: 18 BRPM | TEMPERATURE: 98.5 F

## 2017-05-10 DIAGNOSIS — R11.2 NON-INTRACTABLE VOMITING WITH NAUSEA, UNSPECIFIED VOMITING TYPE: ICD-10-CM

## 2017-05-10 DIAGNOSIS — R11.2 NON-INTRACTABLE VOMITING WITH NAUSEA, UNSPECIFIED VOMITING TYPE: Primary | ICD-10-CM

## 2017-05-10 DIAGNOSIS — R10.33 PERIUMBILICAL ABDOMINAL PAIN: Primary | ICD-10-CM

## 2017-05-10 PROCEDURE — 99244 OFF/OP CNSLTJ NEW/EST MOD 40: CPT | Performed by: INTERNAL MEDICINE

## 2017-05-15 ENCOUNTER — OFFICE VISIT (OUTPATIENT)
Dept: ORTHOPEDIC SURGERY | Facility: CLINIC | Age: 52
End: 2017-05-15

## 2017-05-15 VITALS — BODY MASS INDEX: 24.97 KG/M2 | HEIGHT: 70 IN | WEIGHT: 174.4 LBS | RESPIRATION RATE: 18 BRPM

## 2017-05-15 DIAGNOSIS — S62.664A CLOSED NONDISPLACED FRACTURE OF DISTAL PHALANX OF RIGHT RING FINGER, INITIAL ENCOUNTER: Primary | ICD-10-CM

## 2017-05-15 PROCEDURE — 99213 OFFICE O/P EST LOW 20 MIN: CPT | Performed by: ORTHOPAEDIC SURGERY

## 2017-05-24 RX ORDER — SODIUM CHLORIDE 0.9 % (FLUSH) 0.9 %
1-10 SYRINGE (ML) INJECTION AS NEEDED
Status: CANCELLED | OUTPATIENT
Start: 2017-06-02

## 2017-05-24 RX ORDER — SODIUM CHLORIDE 9 MG/ML
70 INJECTION, SOLUTION INTRAVENOUS CONTINUOUS PRN
Status: CANCELLED | OUTPATIENT
Start: 2017-06-02

## 2017-06-02 ENCOUNTER — ANESTHESIA EVENT (OUTPATIENT)
Dept: GASTROENTEROLOGY | Facility: HOSPITAL | Age: 52
End: 2017-06-02

## 2017-06-02 ENCOUNTER — HOSPITAL ENCOUNTER (OUTPATIENT)
Facility: HOSPITAL | Age: 52
Setting detail: HOSPITAL OUTPATIENT SURGERY
Discharge: HOME | End: 2017-06-02
Attending: INTERNAL MEDICINE | Admitting: INTERNAL MEDICINE

## 2017-06-02 ENCOUNTER — ANESTHESIA (OUTPATIENT)
Dept: GASTROENTEROLOGY | Facility: HOSPITAL | Age: 52
End: 2017-06-02

## 2017-06-02 VITALS
RESPIRATION RATE: 18 BRPM | HEART RATE: 56 BPM | SYSTOLIC BLOOD PRESSURE: 131 MMHG | WEIGHT: 125 LBS | HEIGHT: 64 IN | OXYGEN SATURATION: 100 % | TEMPERATURE: 97 F | BODY MASS INDEX: 21.34 KG/M2 | DIASTOLIC BLOOD PRESSURE: 87 MMHG

## 2017-06-02 DIAGNOSIS — R11.2 NON-INTRACTABLE VOMITING WITH NAUSEA, UNSPECIFIED VOMITING TYPE: ICD-10-CM

## 2017-06-02 PROCEDURE — 88305 TISSUE EXAM BY PATHOLOGIST: CPT | Performed by: INTERNAL MEDICINE

## 2017-06-02 PROCEDURE — 25010000002 PROPOFOL 200 MG/20ML EMULSION: Performed by: NURSE ANESTHETIST, CERTIFIED REGISTERED

## 2017-06-02 PROCEDURE — 25010000002 MIDAZOLAM PER 1 MG: Performed by: NURSE ANESTHETIST, CERTIFIED REGISTERED

## 2017-06-02 PROCEDURE — 25010000002 ONDANSETRON PER 1 MG: Performed by: NURSE ANESTHETIST, CERTIFIED REGISTERED

## 2017-06-02 PROCEDURE — 43239 EGD BIOPSY SINGLE/MULTIPLE: CPT | Performed by: INTERNAL MEDICINE

## 2017-06-02 RX ORDER — SODIUM CHLORIDE 9 MG/ML
70 INJECTION, SOLUTION INTRAVENOUS CONTINUOUS PRN
Status: DISCONTINUED | OUTPATIENT
Start: 2017-06-02 | End: 2017-06-02 | Stop reason: HOSPADM

## 2017-06-02 RX ORDER — ONDANSETRON 4 MG/1
4 TABLET, FILM COATED ORAL EVERY 8 HOURS PRN
Qty: 30 TABLET | Refills: 1 | Status: SHIPPED | OUTPATIENT
Start: 2017-06-02 | End: 2017-11-09

## 2017-06-02 RX ORDER — ONDANSETRON 2 MG/ML
INJECTION INTRAMUSCULAR; INTRAVENOUS AS NEEDED
Status: DISCONTINUED | OUTPATIENT
Start: 2017-06-02 | End: 2017-06-02 | Stop reason: SURG

## 2017-06-02 RX ORDER — SODIUM CHLORIDE 0.9 % (FLUSH) 0.9 %
1-10 SYRINGE (ML) INJECTION AS NEEDED
Status: DISCONTINUED | OUTPATIENT
Start: 2017-06-02 | End: 2017-06-02 | Stop reason: HOSPADM

## 2017-06-02 RX ORDER — MIDAZOLAM HYDROCHLORIDE 1 MG/ML
INJECTION INTRAMUSCULAR; INTRAVENOUS AS NEEDED
Status: DISCONTINUED | OUTPATIENT
Start: 2017-06-02 | End: 2017-06-02 | Stop reason: SURG

## 2017-06-02 RX ORDER — LIDOCAINE HYDROCHLORIDE 20 MG/ML
INJECTION, SOLUTION INFILTRATION; PERINEURAL AS NEEDED
Status: DISCONTINUED | OUTPATIENT
Start: 2017-06-02 | End: 2017-06-02 | Stop reason: SURG

## 2017-06-02 RX ORDER — PROPOFOL 10 MG/ML
INJECTION, EMULSION INTRAVENOUS AS NEEDED
Status: DISCONTINUED | OUTPATIENT
Start: 2017-06-02 | End: 2017-06-02 | Stop reason: SURG

## 2017-06-02 RX ADMIN — PROPOFOL 50 MG: 10 INJECTION, EMULSION INTRAVENOUS at 08:40

## 2017-06-02 RX ADMIN — ONDANSETRON 4 MG: 2 INJECTION INTRAMUSCULAR; INTRAVENOUS at 08:34

## 2017-06-02 RX ADMIN — PROPOFOL 50 MG: 10 INJECTION, EMULSION INTRAVENOUS at 08:35

## 2017-06-02 RX ADMIN — LIDOCAINE HYDROCHLORIDE 30 MG: 20 INJECTION, SOLUTION INFILTRATION; PERINEURAL at 08:34

## 2017-06-02 RX ADMIN — PROPOFOL 50 MG: 10 INJECTION, EMULSION INTRAVENOUS at 08:45

## 2017-06-02 RX ADMIN — SODIUM CHLORIDE 70 ML/HR: 9 INJECTION, SOLUTION INTRAVENOUS at 07:26

## 2017-06-02 RX ADMIN — MIDAZOLAM HYDROCHLORIDE 1 MG: 1 INJECTION, SOLUTION INTRAMUSCULAR; INTRAVENOUS at 08:34

## 2017-06-02 RX ADMIN — PROPOFOL 50 MG: 10 INJECTION, EMULSION INTRAVENOUS at 08:50

## 2017-06-02 RX ADMIN — PROPOFOL 50 MG: 10 INJECTION, EMULSION INTRAVENOUS at 08:52

## 2017-06-02 NOTE — PLAN OF CARE
Problem: GI Endoscopy (Adult)  Goal: Signs and Symptoms of Listed Potential Problems Will be Absent or Manageable (GI Endoscopy)  Outcome: Ongoing (interventions implemented as appropriate)    06/02/17 0729   GI Endoscopy   Problems Assessed (GI Endoscopy) all   Problems Present (GI Endoscopy) none

## 2017-06-02 NOTE — DISCHARGE INSTRUCTIONS
To assist you in voiding:  Drink plenty of fluids  Listen to running water while attempting to void.    If you are unable to urinate and you have an uncomfortable urge to void or it has been   6 hours since you were discharged, return to the Emergency Room      Diet:   Nothing by mouth until fully alert.  Then if no nausea vomiting or abdominal pain may have  Clear liquids diet (No Sodas) for 30 minutes.  May advance to soft diet in 30 minutes if no chest pains, Fever or chills, nausea vomiting or bleeding.      Limit Coffee, Chocolate, Fried Foods,   Morning.  Avoid Sodas,High Fat Foods, Cookies, Excessive Tomato or Onions, Alcohol and Smoking).    Blood Thinner Directions:    Avoid Aspirin & other NSAIDS for 7 days.  Tylenol is okay.    Treatments:    Other Instructions:    Call [facility] at [phone] or come to the Emergency Department if you experience the following: Chest pain, abdominal pain, bleeding (vomiting of blood or coffee colored material, black stools or any blood in stools), fever/chills, nausea and vomiting or dizziness.    Follow-up:       Follow-up:  DR. ILAN URIARTE in 2-3 weeks.Office phone # [phone].

## 2017-06-02 NOTE — ANESTHESIA POSTPROCEDURE EVALUATION
Patient: Ernestina Medina    Procedure Summary     Date Anesthesia Start Anesthesia Stop Room / Location    06/02/17 0833 0856 [facility] ENDOSCOPY 2 / [facility] ENDOSCOPY       Procedure Diagnosis Surgeon Provider    ESOPHAGOGASTRODUODENOSCOPY WITH BIOPSIES AND COLD BIOPSY POLYPECTOMY (N/A Esophagus) Non-intractable vomiting with nausea, unspecified vomiting type  (Non-intractable vomiting with nausea, unspecified vomiting type [R11.2]) MD Wilber Ramos CRNA          Anesthesia Type: MAC  Last vitals  BP      Temp      Pulse     Resp      SpO2        Post Anesthesia Care and Evaluation    Patient location during evaluation: bedside  Patient participation: complete - patient participated  Level of consciousness: awake and awake and alert  Pain score: 0  Pain management: satisfactory to patient  Airway patency: patent  Anesthetic complications: No anesthetic complications  PONV Status: none  Cardiovascular status: acceptable and stable  Respiratory status: acceptable, spontaneous ventilation, room air and nonlabored ventilation  Hydration status: acceptable

## 2017-06-02 NOTE — OP NOTE
PROCEDURE:  Upper Endoscopy with biopsies.    DATE OF PROCEDURE: June 2, 2017.    REFERRING PROVIDER:  Uma Anne MD.     INSTRUMENT:  Olympus GIF H180 J video endoscope.       INDICATIONS OF THE PROCEDURE:  This is a 52-year-old white female with history of recurrent nausea vomiting and upper abdominal pain.  Currently undergoing upper endoscopy for further evaluation.       BIOPSIES: Second portion of duodenum.  Gastric antrum, angularis and body of the stomach.  One cold biopsy polypectomy was performed at the cardia.  Biopsies were obtained from the cardia polypoid area just below the GE junction.     MEDICATIONS:  MAC.     PHOTOGRAPHS:  Photographs were included in the medical records.     CONSENT/PREPROCEDURE EVALUATION:  Risks, benefits, alternatives and options of the procedure including risks of anesthesia/sedation were discussed and informed consent was obtained prior to the procedure. History and physical examination were performed and nothing precluded the test.     REPORT:  The patient was placed in left lateral decubitus position. Once under the influence of IV sedation, the instrument was inserted into the mouth and esophagus was intubated under direct vision without difficulty.     Esophagus:  Z line was noted to be around 40  cm.  A small sliding hiatal hernia less than 3 cm was noted.  Erythematous distal esophagitis was seen.  No Moore's esophagus was seen.  Polypoid area was noted just below the GE junction at cardia.  This was biopsied.     Stomach:  Antrum:  Erythematous-erosive gastritis.  Angulus, lesser and greater curves: Normal.  Retroflex examination: Sliding hiatal hernia.  Cardia and fundus:  A 4 mm sessile polyp was seen just below the cardia.  This was removed with cold biopsy forceps.     Body of the stomach: Erythematous-erosive gastritis.  Good distensibility of the stomach was achieved no giant folds were noted.   Biopsies were obtained from the gastric antrum, angularis and  body of the stomach.  The stomach appeared to be redundant.    Pylorus and pyloric channel:  normal.     Duodenum:  Bulb: Normal.  Second portion: normal.  No scalloping was seen in the second portion of duodenum.  Biopsies were obtained from the second portion of duodenum.  Major ampulla was in part visualized and appeared to be within normal limits.  Subtle angulation was noted within the second portion of duodenum.  Attempts were made to advance the scope into the distal duodenum and proximal jejunum however this was not possible since the scope has a tendency to loop within the stomach.    Intervention:  None.       The upper GI tract was decompressed and the scope was pulled out of the patient. The patient tolerated the procedure well.     DIAGNOSES:     1. Erythematous distal esophagitis.  2. Gastric polyp.  3. Erythematous-erosive gastritis.  4. Small sliding hiatal hernia less than 3 cm.    RECOMMENDATIONS:  1.  Dietary instructions.  2.  A trial of Nexium 20 mg 1 p.o. q.a.m. 1/2 hour before breakfast.  3.  Follow biopsies.  4.  Follow up in office.  5.  Zofran 4 mg tablet.  1-to 3 times a day by mouth as needed for nausea.         Thank you very much for letting me participate in the care of this patient. Please do not hesitate to call me if you have any questions.

## 2017-06-02 NOTE — H&P (VIEW-ONLY)
Chief Complaint   Patient presents with   • Abdominal Pain   • Nausea   • Vomiting   • Anemia       History of Present Illness     There is history of periumbilical abdominal pain off and on for the last i year or so.  The pain is sudden in onset, moderate in severity and Burning in character.  Frequency being 2-3 times a week.  The pain may last for several minutes.  There is no radiation of abdominal pain.  Eating worsens the abdominal discomfort.  Vomiting helps with the pain.    The patient has history of nausea and vomiting off and on for the last  1 year.  Nausea is sudden in onset, moderate in severity and frequency being 2-3 times a week.  The patient may feel nauseated for several minutes.  There is associated vomiting.  Generally nausea and vomiting are associated with abdominal pain.  Nausea is worsened by eating.  After and emesis the patient feels better.    Nausea vomiting and abdominal pain occurs in episodes that last for about 2-3 days and occur perhaps on average once a month.  The patient feels exhausted afterwards.  There is no history of weight loss.  The patient has history of sweats during the attacks as well as night sweats.  She has also some jerky movements.  More recently the patient was diagnosed to have restless leg syndrome.  Although with medication her legs have improved however the patient continues to have some jerky movements of the body.  The patient feels feverish feeling.  The patient has noticed an erythematous rash on her left side between neck and shoulder.    She denies diarrhea or constipation.  The patient moves her bowels on regular basis.. There is no history of overt GI bleed (Hematemesis, melena or hematochezia).  There is no history of reflux.  She denies dysphagia or odynophagia.  There is no history of liver or pancreatic disease.  The patient is status post cholecystectomy in the 90s for gallstones.  There is no history of jaundice.  She denies darkening of urine  "color, lightning of stool color or pruritus.  The patient is status post hysterectomy.  There is history of endometriosis.  The patient had multiple surgeries involving the left ovary.  Of interest the patient still has both ovaries.  About 2-3 years ago the patient was diagnosed to have \"pernicious anemia\".  She has been on B12 shots.  The patient has not been very regular regarding her B12 shots.  The patient has history of nosebleeds at times.  In the recent past the patient has sustained an accidental injury to her right fourth digit.    There is no history of smoking.  She denies history of drugs.  There is history of tattoos.  The patient drinks alcohol \"socially\".  However of note on average she drinks about 6-10 drinks per week.  The patient has history of migraine headaches and anxiety as well as asthma.  There is no family history of colon cancer or inflammatory bowel disease.  The patient never had a colonoscopy.      Review of Systems   Constitutional: Positive for fatigue. Negative for appetite change, chills, fever and unexpected weight change.   HENT: Positive for nosebleeds. Negative for mouth sores and trouble swallowing.    Eyes: Negative for discharge and redness.   Respiratory: Negative for apnea, cough and shortness of breath.    Cardiovascular: Negative for chest pain, palpitations and leg swelling.   Gastrointestinal: Positive for abdominal pain, nausea and vomiting. Negative for abdominal distention, anal bleeding, blood in stool, constipation and diarrhea.   Endocrine: Negative for cold intolerance, heat intolerance and polydipsia.   Genitourinary: Negative for dysuria, hematuria and urgency.   Musculoskeletal: Negative for arthralgias, joint swelling and myalgias.   Skin: Negative for rash.   Allergic/Immunologic: Negative for food allergies and immunocompromised state.   Neurological: Positive for headaches. Negative for dizziness, seizures and syncope.   Hematological: Negative for " adenopathy. Does not bruise/bleed easily.   Psychiatric/Behavioral: Negative for dysphoric mood. The patient is not nervous/anxious and is not hyperactive.      Patient Active Problem List   Diagnosis   • Anxiety   • Asthma   • Migraine headache   • Mixed hyperlipidemia   • Neuralgia   • Pernicious anemia   • Recurrent major depressive disorder   • Vitamin B12 deficiency     Past Medical History:   Diagnosis Date   • Abdominal pain    • Anxiety    • Asthma    • Elbow pain    • Endometriosis    • Fatigue    • Herpes labialis    • Joint pain of ankle and foot    • Leukopenia    • Migraine    • Neoplasm of uncertain behavior of skin    • Ovarian cyst    • Pain in left knee    • Pain in limb    • Paresthesia    • Pernicious anemia    • Plantar fasciitis    • Polycystic ovarian syndrome    • Recurrent aphthous stomatitis    • Tattoo    • Vitamin B12 deficiency      Past Surgical History:   Procedure Laterality Date   • CHOLECYSTECTOMY  1990   • FOOT SURGERY  2015   • HYSTERECTOMY     • OTHER SURGICAL HISTORY       Drainage Of Ovarian Cyst(s) Vaginal Approach Left Ovary     Family History   Problem Relation Age of Onset   • Arthritis Mother    • Hypertension Mother    • Migraines Mother    • Other Father      arteriosclerotic cardiovascular disease   • Stroke Father    • Stroke Brother    • Hypertension Other      sibling   • Colon cancer Neg Hx      Social History   Substance Use Topics   • Smoking status: Never Smoker   • Smokeless tobacco: Never Used   • Alcohol use Yes      Comment: social       Current Outpatient Prescriptions:   •  baclofen (LIORESAL) 10 MG tablet, Take 1 tablet by mouth At Night As Needed for Muscle Spasms., Disp: 30 tablet, Rfl: 3  •  FLUoxetine (PROzac) 40 MG capsule, Take 1 capsule by mouth Daily., Disp: 90 capsule, Rfl: 3  •  pramipexole (MIRAPEX) 1 MG tablet, Take 1 tablet by mouth Every Night., Disp: 90 tablet, Rfl: 3    Current Facility-Administered Medications:   •  cyanocobalamin injection  "1,000 mcg, 1,000 mcg, Intramuscular, Q28 Days, Uma Anne MD, 1,000 mcg at 01/09/17 0952    Allergies   Allergen Reactions   • Buspar [Buspirone]    • Compazine [Prochlorperazine]        Blood pressure 113/73, pulse 61, temperature 98.5 °F (36.9 °C), resp. rate 18, height 64\" (162.6 cm), weight 130 lb (59 kg).    Physical Exam   Constitutional: She is oriented to person, place, and time. She appears well-developed and well-nourished. No distress.   HENT:   Head: Normocephalic and atraumatic.   Right Ear: Hearing and external ear normal.   Left Ear: Hearing and external ear normal.   Nose: Nose normal.   Mouth/Throat: Oropharynx is clear and moist and mucous membranes are normal. Mucous membranes are not pale, not dry and not cyanotic. No oral lesions. No oropharyngeal exudate.   Eyes: Conjunctivae and EOM are normal. Right eye exhibits no discharge. Left eye exhibits no discharge. No scleral icterus.   Neck: Trachea normal. Neck supple. No JVD present. No edema present. No thyroid mass and no thyromegaly present.   Cardiovascular: Normal rate, regular rhythm, S2 normal and normal heart sounds.  Exam reveals no gallop, no S3 and no friction rub.    No murmur heard.  Pulmonary/Chest: Effort normal and breath sounds normal. No respiratory distress. She has no wheezes. She has no rales. She exhibits no tenderness.   Abdominal: Soft. Normal appearance and bowel sounds are normal. She exhibits no distension, no ascites and no mass. There is no splenomegaly or hepatomegaly. There is tenderness in the epigastric area and periumbilical area. There is no rigidity, no rebound and no guarding. No hernia.   Musculoskeletal: She exhibits no tenderness or deformity.       Vascular Status -  Her exam exhibits no right foot edema. Her exam exhibits no left foot edema.  Lymphadenopathy:     She has no cervical adenopathy.        Left: No supraclavicular adenopathy present.   Neurological: She is alert and oriented to person, " place, and time. She has normal strength. No cranial nerve deficit or sensory deficit. She exhibits normal muscle tone. Coordination normal.   Skin: No rash noted. She is not diaphoretic. No cyanosis. No pallor. Nails show no clubbing.   Psychiatric: She has a normal mood and affect. Her behavior is normal. Judgment and thought content normal.   Nursing note and vitals reviewed.    Stigmata of chronic liver disease:  None.  Asterixis:  None.    Laboratory Testing:  Upon review of medical records:    Dated June 27, 2014 sodium 144 potassium 4.3 chloride 109 CO2 25 BUN 21 serum creatinine 0.6 and glucose 88.  Calcium 9.0.  Albumin 4.8.  T bili 0.5 AST 25 ALT 27 alkaline phosphatase 57.  Total cholesterol 164.Triglycerides 33.  TSH 0.99.  Serum iron 54.9.  TIBC 351.  Vitamin B12 level 226.  Folate level 40.50.  WBC 4.7 hemoglobin 12.8 hematocrit 38 MCV 94.5 and platelet count 209.      Dated June 22, 2015 sodium 142 potassium 4.6 chloride 107 CO2 25 BUN 17 serum creatinine 0.7 glucose 98.  Calcium 8.8.  Albumin 4.3.  T bili 0.4 AST 24 ALT 21 alkaline phosphatase 57.  Total cholesterol 167.  Triglycerides 40.  TSH 1.21.  Vitamin B12 level 384.  WBC 3.8 hemoglobin 12.4 hematocrit 39 MCV 96.5 and platelet count 217.    Dated October 28, 2016 sodium 142 potassium 4.8 chloride 104 CO2 29 BUN 15 serum creatinine 0.6 and glucose 93.  Calcium 9.8.  Albumin 4.5.  T bili 1.0 AST 29 ALT 38 alkaline phosphatase 57.  Total cholesterol 201.  Triglycerides 43.  WBC 4.8 hemoglobin 12.5 hematocrit 39 MCV 96.7 and platelet count 226.    Dated March 20, 2017 sodium 141 potassium 4.4 chloride 106 CO2 25 BUN 15 serum creatinine 0.60 and glucose 93.  Calcium 9.7.  Albumin 4.70.  T bili 0.7 AST 27 ALT 28 alkaline phosphatase 59.  Amylase 64.  Lipase 107. TSH 1.260.  WBC 4.63 hemoglobin 13.1 hematocrit 39.8 MCV 95.3 and platelet count 256.      Abdominal Imaging:  Upon review of medical records:      Dated March 29, 2017 the patient underwent  a CT of the abdomen and pelvis with oral and IV contrast which revealed: Clear lung bases.  Heart is normal in size.  Liver is normal.  Patient is status post cholecystectomy.  The spleen is unremarkable.  No adrenal mass is present.  The pancreas is normal.  Kidneys are normal.  Aorta is normal in caliber.  No free fluid or adenopathy.  The abdominal portions of the GI tract are unremarkable with no evidence of obstruction.  The appendix is not identified.  There is a moderate amount of stool present within the colon.  The urinary bladder is unremarkable.  Patient is status post hysterectomy.  No significant free fluid or adenopathy in the pelvis.    Assessment and Plan:      Ernestina was seen today for abdominal pain, nausea, vomiting and anemia.    Diagnoses and all orders for this visit:    Periumbilical abdominal pain  Comments:  Etiology unclear.  Differential includes peptic ulcer disease, pancreatobiliary disease, adhesions and endometriosis.    Non-intractable vomiting with nausea, unspecified vomiting type  Comments:  Etiology unclear.  Differential includes peptic ulcer disease, pancreatobiliary disease, adhesions and endometriosis.  ?  Nonspecific Gastro intestinal dysmotil            Plan  and Patient Instructions:    Patient Instructions     1. Low fat diet.  2. Upper endoscopy (EGD) counseling:  Description of the procedure, risks, benefits, alternatives and options including nonoperative options were discussed with the patient in detail.  The patient understands and wishes to proceed.  3. Colonoscopy should be considered in the future.  4. Since the patient has issues with injections and does not want to have B12 shots on regular basis and alternative form such as sublingual may be considered.  This should be supplemented with perhaps once to twice a year of injection to ensure enough stores.  Furthermore periodic check of B12 may also be considered in that situation.  Of interest that the patient does  not take B-12 shots on regular basis.  5.  Discussed with the patient in detail.  Opportunity was given to ask questions.        Yovany Pacheco MD

## 2017-06-02 NOTE — ANESTHESIA PREPROCEDURE EVALUATION
Anesthesia Evaluation     Patient summary reviewed and Nursing notes reviewed   history of anesthetic complications: PONV  NPO Solid Status: > 8 hours  NPO Liquid Status: > 8 hours     Airway   Mallampati: I  TM distance: >3 FB  Neck ROM: full  no difficulty expected  Dental - normal exam     Pulmonary - normal exam    breath sounds clear to auscultation  (+) asthma,   (-) not a smoker    ROS comment: No problems asthma for 5 years  Cardiovascular - normal exam    ECG reviewed  Rhythm: regular  Rate: normal    (+) hyperlipidemia    ROS comment: EKG SB 52, NSR    Neuro/Psych  (+) headaches, psychiatric history Anxiety and Depression,      ROS Comment: Migraines  GI/Hepatic/Renal/Endo - negative ROS     ROS Comment: N/V reason for EGD    Musculoskeletal (-) negative ROS    Abdominal    Substance History   (+) alcohol use,       Comment: Social etoh   OB/GYN negative ob/gyn ROS         Other - negative ROS                                       Anesthesia Plan    ASA 2     MAC   (Pt told that intravenous sedation will be used as the primary anesthetic. Every effort will be made to make sure the patient is comfortable.     The patient was told they may or may not have recall for the procedure.Will proceed with the plan of care.)  intravenous induction   Anesthetic plan and risks discussed with patient.

## 2017-06-06 LAB
LAB AP CASE REPORT: NORMAL
Lab: NORMAL
PATH REPORT.FINAL DX SPEC: NORMAL

## 2017-06-23 DIAGNOSIS — Z09 FOLLOW-UP EXAM: Primary | ICD-10-CM

## 2017-07-12 ENCOUNTER — OFFICE VISIT (OUTPATIENT)
Dept: GASTROENTEROLOGY | Facility: CLINIC | Age: 52
End: 2017-07-12

## 2017-07-12 VITALS
WEIGHT: 131 LBS | HEART RATE: 61 BPM | SYSTOLIC BLOOD PRESSURE: 126 MMHG | DIASTOLIC BLOOD PRESSURE: 76 MMHG | BODY MASS INDEX: 22.36 KG/M2 | RESPIRATION RATE: 14 BRPM | TEMPERATURE: 97.6 F | HEIGHT: 64 IN

## 2017-07-12 DIAGNOSIS — K29.70 GASTRITIS WITHOUT BLEEDING, UNSPECIFIED CHRONICITY, UNSPECIFIED GASTRITIS TYPE: ICD-10-CM

## 2017-07-12 DIAGNOSIS — K20.90 ESOPHAGITIS: ICD-10-CM

## 2017-07-12 DIAGNOSIS — R11.0 NAUSEA: Chronic | ICD-10-CM

## 2017-07-12 DIAGNOSIS — R10.13 EPIGASTRIC PAIN: Primary | ICD-10-CM

## 2017-07-12 PROBLEM — R53.83 FATIGUE: Status: ACTIVE | Noted: 2017-07-12

## 2017-07-12 PROBLEM — M25.579 PAIN IN JOINT INVOLVING ANKLE AND FOOT: Status: ACTIVE | Noted: 2017-07-12

## 2017-07-12 PROBLEM — R20.2 PARESTHESIA: Status: ACTIVE | Noted: 2017-07-12

## 2017-07-12 PROBLEM — M72.2 PLANTAR FASCIITIS: Status: ACTIVE | Noted: 2017-07-12

## 2017-07-12 PROBLEM — M25.569 KNEE PAIN: Status: ACTIVE | Noted: 2017-07-12

## 2017-07-12 PROBLEM — D48.5 NEOPLASM OF UNCERTAIN BEHAVIOR OF SKIN: Status: ACTIVE | Noted: 2017-07-12

## 2017-07-12 PROBLEM — K12.0 RECURRENT APHTHOUS ULCER: Status: ACTIVE | Noted: 2017-07-12

## 2017-07-12 PROBLEM — D64.9 ANEMIA: Status: ACTIVE | Noted: 2017-07-12

## 2017-07-12 PROCEDURE — 99213 OFFICE O/P EST LOW 20 MIN: CPT | Performed by: NURSE PRACTITIONER

## 2017-07-12 NOTE — PATIENT INSTRUCTIONS
1. Antireflux measures: Avoid fried, fatty foods, alcohol, chocolate, coffee, tea,  soft drinks, peppermint and spearmint, spicy foods, tomatoes and tomato based foods, onion based foods, and smoking. Other antireflux measures include weight reduction if overweight, avoiding tight clothing around the abdomen, elevating the head of the bed 6 inches with blocks under the head board, and don't drink or eat before going to bed and avoid lying down immediately after meals.  2. Nexium 20 mg 1 po daily in the am 30 minutes before breakfast.  3. Zofran 4 mg 1 tablet as needed for nausea.  4. Recommended colonoscopy. The patient declines at this time.  5. Follow up: The patient will call back

## 2017-07-12 NOTE — PROGRESS NOTES
"[address] [facility] [address]    Chief Complaint   Patient presents with   • Follow-up   • Nausea     There is history of periumbilical abdominal pain off and on for the last year or so. The pain is sudden in onset, moderate in severity and burning in character.  The pain may last for several minutes. The patient states her abdominal pain has significantly improved. She has been taking Nexium 20 mg 1 tablet in the am and this has helped. She is feeling much better.    The patient has history of nausea and vomiting off and on for the last 1 year. Nausea is sudden in onset, moderate in severity and frequency being 2-3 times a week. The patient states nausea has improved. She states she has a history of migraines and her nausea is often associated with migraines. She does not have nausea with eating or drinking at this time. Occasionally, the nausea can occur when she is having joint pain. She is seeing a specialist for her migraines and joint pain.     There is no history of weight loss. She denies diarrhea or constipation. The patient moves her bowels on regular basis.. There is no history of overt GI bleed (Hematemesis, melena or hematochezia).  There is no history of reflux. She denies dysphagia or odynophagia. There is no history of liver or pancreatic disease. The patient is status post cholecystectomy in the 90s for gallstones. There is no history of jaundice. She denies darkening of urine color, lightning of stool color or pruritus. The patient is status post hysterectomy. There is history of endometriosis. The patient had multiple surgeries involving the left ovary. Of interest the patient still has both ovaries. About 2-3 years ago the patient was diagnosed to have \"pernicious anemia\". She has been on B12 shots. The patient has not been very regular regarding her B12 shots. The patient has history of nosebleeds at times. In the recent past the patient has sustained an accidental injury to her " "right fourth digit.     There is no history of smoking. She denies history of drugs. There is history of tattoos. The patient drinks alcohol \"socially\". However of note on average she drinks about 6-10 drinks per week. The patient has history of migraine headaches and anxiety as well as asthma. There is no family history of colon cancer or inflammatory bowel disease. The patient never had a colonoscopy.     Nausea   This is a chronic problem. The current episode started more than 1 year ago. The problem occurs intermittently. The problem has been gradually improving. Associated symptoms include fatigue and headaches (history of migraines). Pertinent negatives include no abdominal pain, arthralgias, chest pain, chills, coughing, fever, joint swelling, myalgias, nausea, rash, vertigo or vomiting. Exacerbated by: Migraines. Treatments tried: Zofran. The treatment provided significant relief.     Review of Systems   Constitutional: Positive for fatigue. Negative for appetite change, chills, fever and unexpected weight change.   HENT: Positive for mouth sores. Negative for nosebleeds and trouble swallowing.         When b12 gets low   Eyes: Negative for discharge and redness.   Respiratory: Negative for apnea, cough and shortness of breath.    Cardiovascular: Negative for chest pain, palpitations and leg swelling.   Gastrointestinal: Negative for abdominal distention, abdominal pain, anal bleeding, blood in stool, constipation, diarrhea, nausea and vomiting.   Endocrine: Negative for cold intolerance, heat intolerance and polydipsia.   Genitourinary: Negative for dysuria, hematuria and urgency.   Musculoskeletal: Negative for arthralgias, joint swelling and myalgias.   Skin: Negative for rash.   Allergic/Immunologic: Negative for food allergies and immunocompromised state.   Neurological: Positive for headaches (history of migraines). Negative for dizziness, vertigo, seizures and syncope.   Hematological: Negative for " adenopathy. Does not bruise/bleed easily.   Psychiatric/Behavioral: Negative for dysphoric mood. The patient is not nervous/anxious and is not hyperactive.      Patient Active Problem List   Diagnosis   • Anxiety   • Asthma   • Migraine headache   • Mixed hyperlipidemia   • Neuralgia   • Pernicious anemia   • Recurrent major depressive disorder   • Cobalamin deficiency   • Epigastric pain   • Nausea   • Esophagitis   • Gastritis without bleeding   • Anemia   • Fatigue   • Pain in joint involving ankle and foot   • Neoplasm of uncertain behavior of skin   • Knee pain   • Paresthesia   • Plantar fasciitis   • Recurrent aphthous ulcer     Past Medical History:   Diagnosis Date   • Abdominal pain    • Anxiety    • Asthma    • Elbow pain    • Endometriosis    • Fatigue    • Herpes labialis    • Joint pain of ankle and foot    • Leukopenia    • Migraine    • Ovarian cyst    • Pain in left knee    • Pain in limb    • Paresthesia    • Pernicious anemia    • Plantar fasciitis    • Polycystic ovarian syndrome    • PONV (postoperative nausea and vomiting)    • Recurrent aphthous stomatitis    • Tattoo    • Vitamin B12 deficiency      Past Surgical History:   Procedure Laterality Date   • CHOLECYSTECTOMY  1990   • ENDOSCOPY N/A 6/2/2017    Procedure: ESOPHAGOGASTRODUODENOSCOPY WITH BIOPSIES AND COLD BIOPSY POLYPECTOMY;  Surgeon: Yovany Pacheco MD;  Location: [facility] ENDOSCOPY;  Service:    • FOOT SURGERY Right 2015   • HYSTERECTOMY     • OTHER SURGICAL HISTORY      CYST FROM LEFT OVARY   • UPPER GASTROINTESTINAL ENDOSCOPY  06/02/2017   • WISDOM TOOTH EXTRACTION       Family History   Problem Relation Age of Onset   • Arthritis Mother    • Hypertension Mother    • Migraines Mother    • Other Father      arteriosclerotic cardiovascular disease   • Stroke Father    • Stroke Brother    • Hypertension Other      sibling   • Colon cancer Neg Hx      Social History   Substance Use Topics   • Smoking status: Never Smoker   • Smokeless  "tobacco: Never Used   • Alcohol use Yes      Comment: social       Current Outpatient Prescriptions:   •  baclofen (LIORESAL) 10 MG tablet, Take 1 tablet by mouth At Night As Needed for Muscle Spasms., Disp: 30 tablet, Rfl: 3  •  esomeprazole (NEXIUM) 20 MG capsule, Take 1 capsule every morning 30 minutes before breakfast, Disp: 30 capsule, Rfl: 5  •  FLUoxetine (PROzac) 40 MG capsule, Take 1 capsule by mouth Daily., Disp: 90 capsule, Rfl: 3  •  ondansetron (ZOFRAN) 4 MG tablet, Take 1 tablet by mouth Every 8 (Eight) Hours As Needed for Nausea or Vomiting., Disp: 30 tablet, Rfl: 1    Current Facility-Administered Medications:   •  cyanocobalamin injection 1,000 mcg, 1,000 mcg, Intramuscular, Q28 Days, Uma Anne MD, 1,000 mcg at 01/09/17 0952    Allergies   Allergen Reactions   • Buspar [Buspirone] Nausea And Vomiting   • Compazine [Prochlorperazine] Other (See Comments)     \"IT MAKES MY JAW LOCK-UP\"     /76  Pulse 61  Temp 97.6 °F (36.4 °C)  Resp 14  Ht 64\" (162.6 cm)  Wt 131 lb (59.4 kg)  BMI 22.49 kg/m2    Physical Exam   Constitutional: She is oriented to person, place, and time. She appears well-developed and well-nourished. No distress.   HENT:   Head: Normocephalic and atraumatic.   Right Ear: Hearing and external ear normal.   Left Ear: Hearing and external ear normal.   Nose: Nose normal.   Mouth/Throat: Oropharynx is clear and moist and mucous membranes are normal. Mucous membranes are not pale, not dry and not cyanotic. No oral lesions. No oropharyngeal exudate.   Eyes: Conjunctivae and EOM are normal. Right eye exhibits no discharge. Left eye exhibits no discharge.   Neck: Trachea normal. Neck supple. No JVD present. No edema present. No thyroid mass and no thyromegaly present.   Cardiovascular: Normal rate, regular rhythm, S2 normal and normal heart sounds.  Exam reveals no gallop, no S3 and no friction rub.    No murmur heard.  Pulmonary/Chest: Effort normal and breath sounds normal. No " respiratory distress. She exhibits no tenderness.   Abdominal: Normal appearance and bowel sounds are normal. She exhibits no distension, no ascites and no mass. There is no splenomegaly or hepatomegaly. There is no tenderness. There is no rigidity, no rebound and no guarding. No hernia.       Vascular Status -  Her exam exhibits no right foot edema. Her exam exhibits no left foot edema.  Lymphadenopathy:     She has no cervical adenopathy.        Left: No supraclavicular adenopathy present.   Neurological: She is alert and oriented to person, place, and time. She has normal strength. No cranial nerve deficit or sensory deficit.   Skin: No rash noted. She is not diaphoretic. No cyanosis. No pallor. Nails show no clubbing.   Psychiatric: She has a normal mood and affect.   Nursing note and vitals reviewed.  Stigmata of chronic liver disease:  None.  Asterixis:  None.    Laboratory Testing:  Upon review of medical records:     Dated June 27, 2014 sodium 144 potassium 4.3 chloride 109 CO2 25 BUN 21 serum creatinine 0.6 and glucose 88. Calcium 9.0. Albumin 4.8. T bili 0.5 AST 25 ALT 27 alkaline phosphatase 57. Total cholesterol 164.Triglycerides 33. TSH 0.99. Serum iron 54.9. TIBC 351. Vitamin B12 level 226. Folate level 40.50. WBC 4.7 hemoglobin 12.8 hematocrit 38 MCV 94.5 and platelet count 209.      Dated June 22, 2015 sodium 142 potassium 4.6 chloride 107 CO2 25 BUN 17 serum creatinine 0.7 glucose 98. Calcium 8.8. Albumin 4.3. T bili 0.4 AST 24 ALT 21 alkaline phosphatase 57. Total cholesterol 167. Triglycerides 40. TSH 1.21. Vitamin B12 level 384. WBC 3.8 hemoglobin 12.4 hematocrit 39 MCV 96.5 and platelet count 217.     Dated October 28, 2016 sodium 142 potassium 4.8 chloride 104 CO2 29 BUN 15 serum creatinine 0.6 and glucose 93. Calcium 9.8. Albumin 4.5. T bili 1.0 AST 29 ALT 38 alkaline phosphatase 57. Total cholesterol 201. Triglycerides 43. WBC 4.8 hemoglobin 12.5 hematocrit 39 MCV 96.7 and platelet count  226.     Dated March 20, 2017 sodium 141 potassium 4.4 chloride 106 CO2 25 BUN 15 serum creatinine 0.60 and glucose 93. Calcium 9.7. Albumin 4.70. T bili 0.7 AST 27 ALT 28 alkaline phosphatase 59. Amylase 64. Lipase 107. TSH 1.260. WBC 4.63 hemoglobin 13.1 hematocrit 39.8 MCV 95.3 and platelet count 256.      Abdominal Imaging:  Upon review of medical records:      Dated March 29, 2017 the patient underwent a CT of the abdomen and pelvis with oral and IV contrast which revealed: Clear lung bases. Heart is normal in size. Liver is normal. Patient is status post cholecystectomy. The spleen is unremarkable. No adrenal mass is present. The pancreas is normal. Kidneys are normal. Aorta is normal in caliber. No free fluid or adenopathy. The abdominal portions of the GI tract are unremarkable with no evidence of obstruction. The appendix is not identified. There is a moderate amount of stool present within the colon. The urinary bladder is unremarkable. Patient is status post hysterectomy. No significant free fluid or adenopathy in the pelvis.     Procedures:  Upon review of records:     EGD dated 6/2/2017 reveals erythematous distal esophagitis.  Gastric polyp.  Erythematous erosive gastritis.  Small sliding hernia less than 3 cm.  Duodenum second portion biopsy reveals benign small bowel mucosa with no diagnostic abnormality.  Antrum body 9 was biopsy reveals mild chronic gastritis.  Cardia polyp biopsy reveals polypoid fragment oxyntocardia-type mucosa showing chronic carditis.  Negative for intestinal metaplasia.  Cardia polypoid area biopsy reveals chronic carditis with lymphoid aggregate.  Scant reactive squamous epithelium.  Negative for intestinal metaplasia.     Assessment and Plan:      Ernestina was seen today for follow-up and nausea.    Diagnoses and all orders for this visit:    Epigastric pain  Comments:  Improving.  Orders:  -     esomeprazole (NEXIUM) 20 MG capsule; Take 1 capsule every morning 30 minutes  before breakfast    Nausea  Comments:  History of intermittent nausea. Mildly improved. Of interest, the patient states nausea often occurs with migraines or with joint pain.  Orders:  -     esomeprazole (NEXIUM) 20 MG capsule; Take 1 capsule every morning 30 minutes before breakfast    Esophagitis  Comments:  No Moore's.    Gastritis without bleeding, unspecified chronicity, unspecified gastritis type  Comments:  Negative for H. pylori.        Plan  and Patient Instructions:  Patient Instructions   1. Antireflux measures: Avoid fried, fatty foods, alcohol, chocolate, coffee, tea,  soft drinks, peppermint and spearmint, spicy foods, tomatoes and tomato based foods, onion based foods, and smoking. Other antireflux measures include weight reduction if overweight, avoiding tight clothing around the abdomen, elevating the head of the bed 6 inches with blocks under the head board, and don't drink or eat before going to bed and avoid lying down immediately after meals.  2. Nexium 20 mg 1 po daily in the am 30 minutes before breakfast.  3. Zofran 4 mg 1 tablet as needed for nausea.  4. Recommended colonoscopy. The patient declines at this time.  5. Follow up: The patient will call back    Patient Care Team:  Uma Anne MD as PCP - General  Uma Anne MD as PCP - Family Medicine    Dewayne Lobo, JONI

## 2017-07-13 ENCOUNTER — TELEPHONE (OUTPATIENT)
Dept: GASTROENTEROLOGY | Facility: CLINIC | Age: 52
End: 2017-07-13

## 2017-07-13 NOTE — TELEPHONE ENCOUNTER
Nexium 20 mg not covered through insurance. Patient states that she does not mind to pay for the Nexium OTC or through her pharmacy. If wanted we can switch to Omeprazole 20 mg and patient verbalized understanding and will call if she would like to switch.

## 2017-09-15 ENCOUNTER — RESULTS ENCOUNTER (OUTPATIENT)
Dept: FAMILY MEDICINE CLINIC | Facility: CLINIC | Age: 52
End: 2017-09-15

## 2017-09-15 DIAGNOSIS — E78.2 MIXED HYPERLIPIDEMIA: ICD-10-CM

## 2017-11-09 ENCOUNTER — OFFICE VISIT (OUTPATIENT)
Dept: FAMILY MEDICINE CLINIC | Facility: CLINIC | Age: 52
End: 2017-11-09

## 2017-11-09 VITALS
SYSTOLIC BLOOD PRESSURE: 118 MMHG | TEMPERATURE: 98 F | BODY MASS INDEX: 22.83 KG/M2 | WEIGHT: 133 LBS | DIASTOLIC BLOOD PRESSURE: 74 MMHG | RESPIRATION RATE: 16 BRPM | OXYGEN SATURATION: 100 % | HEART RATE: 63 BPM

## 2017-11-09 DIAGNOSIS — E78.00 PURE HYPERCHOLESTEROLEMIA: Primary | ICD-10-CM

## 2017-11-09 DIAGNOSIS — F41.9 ANXIETY: ICD-10-CM

## 2017-11-09 DIAGNOSIS — B35.1 ONYCHOMYCOSIS: ICD-10-CM

## 2017-11-09 DIAGNOSIS — Z12.11 COLON CANCER SCREENING: ICD-10-CM

## 2017-11-09 DIAGNOSIS — F33.0 MILD EPISODE OF RECURRENT MAJOR DEPRESSIVE DISORDER (HCC): ICD-10-CM

## 2017-11-09 DIAGNOSIS — E53.8 VITAMIN B12 DEFICIENCY: ICD-10-CM

## 2017-11-09 PROCEDURE — 99214 OFFICE O/P EST MOD 30 MIN: CPT | Performed by: INTERNAL MEDICINE

## 2017-11-09 PROCEDURE — 96372 THER/PROPH/DIAG INJ SC/IM: CPT | Performed by: INTERNAL MEDICINE

## 2017-11-09 RX ORDER — QUETIAPINE FUMARATE 25 MG/1
25 TABLET, FILM COATED ORAL NIGHTLY
Qty: 30 TABLET | Refills: 6 | Status: SHIPPED | OUTPATIENT
Start: 2017-11-09 | End: 2018-02-07

## 2017-11-09 RX ADMIN — CYANOCOBALAMIN 1000 MCG: 1000 INJECTION, SOLUTION INTRAMUSCULAR; SUBCUTANEOUS at 13:46

## 2017-11-09 NOTE — PROGRESS NOTES
Subjective   Ernestina Medina is a 52 y.o. female.     Chief Complaint   Patient presents with   • Hyperlipidemia   • Anxiety   • Depression       History of Present Illness   Patient is here to follow-up on her cholesterol.  She is trying to follow a diet.  She also complains of anxiety and depression.  Her daughter has several problems and she is stressed out.  She was on Prozac but she stopped taking it.  She needs a B12 shot.  She has pernicious anemia.  She also complains of toenail infection in  Both her big toes, she  is also due for a colonoscopy.  She had an EGD done    The following portions of the patient's history were reviewed and updated as appropriate: allergies, current medications, past family history, past medical history, past social history, past surgical history and problem list.    Review of Systems   Constitutional: Negative for appetite change, fatigue and fever.   HENT: Negative for congestion, ear discharge, ear pain, sinus pressure and sore throat.    Eyes: Negative for pain and discharge.   Respiratory: Negative for cough, chest tightness, shortness of breath and wheezing.    Cardiovascular: Negative for chest pain, palpitations and leg swelling.   Gastrointestinal: Negative for abdominal pain, blood in stool, constipation, diarrhea and nausea.   Endocrine: Negative for cold intolerance and heat intolerance.   Genitourinary: Negative for dysuria, flank pain and frequency.   Musculoskeletal: Negative for back pain and joint swelling.        Toenail problem   Skin: Negative for color change.   Allergic/Immunologic: Negative for environmental allergies and food allergies.   Neurological: Negative for dizziness, weakness, numbness and headaches.   Hematological: Negative for adenopathy. Does not bruise/bleed easily.   Psychiatric/Behavioral: Positive for dysphoric mood. Negative for behavioral problems. The patient is nervous/anxious.          Current Outpatient Prescriptions:   •   QUEtiapine (SEROQUEL) 25 MG tablet, Take 1 tablet by mouth Every Night., Disp: 30 tablet, Rfl: 6    Current Facility-Administered Medications:   •  cyanocobalamin injection 1,000 mcg, 1,000 mcg, Intramuscular, Q28 Days, Uma Anne MD, 1,000 mcg at 01/09/17 0952    Objective     Blood pressure 118/74, pulse 63, temperature 98 °F (36.7 °C), temperature source Oral, resp. rate 16, weight 133 lb (60.3 kg), SpO2 100 %.    Physical Exam   Constitutional: She is oriented to person, place, and time. She appears well-developed and well-nourished. No distress.   HENT:   Head: Normocephalic and atraumatic.   Right Ear: External ear normal.   Left Ear: External ear normal.   Nose: Nose normal.   Mouth/Throat: Oropharynx is clear and moist.   Eyes: Conjunctivae and EOM are normal. Pupils are equal, round, and reactive to light.   Neck: Neck supple. No thyromegaly present.   Cardiovascular: Normal rate, regular rhythm and normal heart sounds.    Pulmonary/Chest: Effort normal and breath sounds normal. No respiratory distress.   Abdominal: Soft. Bowel sounds are normal. She exhibits no distension. There is no tenderness. There is no rebound.   Musculoskeletal: Normal range of motion. She exhibits no edema.   Lymphadenopathy:     She has no cervical adenopathy.   Neurological: She is alert and oriented to person, place, and time.   No gross motor or sensory deficits   Skin: Skin is warm. She is not diaphoretic.   Onychomycosis of bilateral big toenails   Psychiatric: She has a normal mood and affect.   Nursing note and vitals reviewed.      Results for orders placed or performed during the hospital encounter of 06/02/17   Tissue Exam   Result Value Ref Range    Case Report       Surgical Pathology Report                         Case: [id number]                                  Authorizing Provider:  Yovany Pachceo MD          Collected:           06/02/2017 08:44 AM          Ordering Location:     [facility]     Received:            06/02/2017 09:31 AM                                 [facility]                                                                    Pathologist:           Porfirio Montejo MD                                                      Specimens:   1) - Small Intestine, Duodenum, 2ND PORTION BIOPSIES                                                2) - Stomach, ANTRUM, BODY, ANGULUS BX'S                                                            3) - Stomach, CARDIA POLYP                                                                          4) - Stomach, CARDIA  POLYPOID AREA BX'S                                                   Final Diagnosis       See Scanned Report        Embedded Images           Assessment/Plan   Ernestina was seen today for hyperlipidemia, anxiety and depression.    Diagnoses and all orders for this visit:    Pure hypercholesterolemia  -     CBC & Differential  -     Comprehensive Metabolic Panel  -     Lipid Panel    Anxiety    Mild episode of recurrent major depressive disorder    Onychomycosis  -     Ambulatory Referral to Podiatry    Vitamin B12 deficiency    Colon cancer screening  -     Ambulatory Referral to Gastroenterology    Other orders  -     QUEtiapine (SEROQUEL) 25 MG tablet; Take 1 tablet by mouth Every Night.      Plan:  1.  mixed hyperlipidemia: We will obtain fasting CMP, lipid panel.  Diet and excise counseled.   2.Anxiety disorder: Will give a trial with  seroquel 25 mg qhs  3.  Major depression: Start  seroquel  4.  Onychomycosis: Will refer patient to podiatry  5.  Screening for colon cancer: Will refer patient to GI   6. b12 Deficiency: B12 IM given in office today          Uma Anne MD

## 2018-02-07 ENCOUNTER — OFFICE VISIT (OUTPATIENT)
Dept: INTERNAL MEDICINE | Facility: CLINIC | Age: 53
End: 2018-02-07

## 2018-02-07 VITALS
OXYGEN SATURATION: 97 % | WEIGHT: 136 LBS | HEIGHT: 64 IN | BODY MASS INDEX: 23.22 KG/M2 | HEART RATE: 78 BPM | SYSTOLIC BLOOD PRESSURE: 138 MMHG | DIASTOLIC BLOOD PRESSURE: 81 MMHG | TEMPERATURE: 98.8 F

## 2018-02-07 DIAGNOSIS — M94.0 COSTOCHONDRITIS: Primary | ICD-10-CM

## 2018-02-07 PROCEDURE — 99214 OFFICE O/P EST MOD 30 MIN: CPT | Performed by: PHYSICIAN ASSISTANT

## 2018-02-07 PROCEDURE — 96372 THER/PROPH/DIAG INJ SC/IM: CPT | Performed by: PHYSICIAN ASSISTANT

## 2018-02-07 RX ORDER — METHYLPREDNISOLONE SODIUM SUCCINATE 125 MG/2ML
125 INJECTION, POWDER, LYOPHILIZED, FOR SOLUTION INTRAMUSCULAR; INTRAVENOUS ONCE
Status: COMPLETED | OUTPATIENT
Start: 2018-02-07 | End: 2018-02-07

## 2018-02-07 RX ORDER — METHYLPREDNISOLONE SODIUM SUCCINATE 125 MG/2ML
125 INJECTION, POWDER, LYOPHILIZED, FOR SOLUTION INTRAMUSCULAR; INTRAVENOUS EVERY 6 HOURS
Status: CANCELLED | OUTPATIENT
Start: 2018-02-07

## 2018-02-07 RX ORDER — PANTOPRAZOLE SODIUM 40 MG/1
40 TABLET, DELAYED RELEASE ORAL DAILY
Qty: 30 TABLET | Refills: 3 | Status: SHIPPED | OUTPATIENT
Start: 2018-02-07 | End: 2021-07-01

## 2018-02-07 RX ORDER — METHYLPREDNISOLONE 4 MG/1
TABLET ORAL
Qty: 21 TABLET | Refills: 0 | Status: SHIPPED | OUTPATIENT
Start: 2018-02-07 | End: 2021-07-01

## 2018-02-07 RX ADMIN — METHYLPREDNISOLONE SODIUM SUCCINATE 125 MG: 125 INJECTION, POWDER, LYOPHILIZED, FOR SOLUTION INTRAMUSCULAR; INTRAVENOUS at 13:43

## 2018-02-07 RX ADMIN — CYANOCOBALAMIN 1000 MCG: 1000 INJECTION, SOLUTION INTRAMUSCULAR; SUBCUTANEOUS at 13:44

## 2018-02-07 NOTE — PROGRESS NOTES
"Subjective     Chief Complaint: pain with deep breathing    History of Present Illness     Ernestina Medina is a 52 y.o. female presenting with complaints of sharp pain in the center of her chest worse with inhalation x 6 days.  She states the pain is constant and is worse when leaning forward.  Does not radiate to neck, arms, jaw.  She tried taking ibuprofen to help with the pain but it did not resolve.  May have had a mild cough but does not recall feeling sick over the last month.  States that she tends to be a more anxious person, but this pain has been constant over the past several days and not just with moments of anxiety.  Denies fever, shortness of breath or wheezing.  Denies cardiac history, headache, dizziness, vision changes, palpitations, leg swelling.  Denies nausea, abdominal pain, vomiting, diarrhea, constipation, blood in stool.      Does note she has a history of pernicious anemia- requests B12 injection while she is here.    The following portions of the patient's history were reviewed and updated as appropriate: current medications, allergies, PMH.    Review of Systems   Constitutional: Negative.    HENT: Negative.    Respiratory: Positive for chest tightness.    Cardiovascular: Positive for chest pain. Negative for palpitations and leg swelling.   Gastrointestinal: Negative.    Genitourinary: Negative.    Allergic/Immunologic: Negative.    Neurological: Negative.    Psychiatric/Behavioral: Negative.        Objective     Vitals:    02/07/18 1148   BP: 138/81   Pulse: 78   Temp: 98.8 °F (37.1 °C)   SpO2: 97%   Weight: 61.7 kg (136 lb)   Height: 162.6 cm (64.02\")       Physical Exam   Constitutional: She is oriented to person, place, and time. She appears well-developed and well-nourished. No distress.   HENT:   Nose: Nose normal.   Mouth/Throat: Oropharynx is clear and moist.   Eyes: EOM are normal. Pupils are equal, round, and reactive to light.   Neck: Normal range of motion. Neck " supple.   Cardiovascular: Normal rate, regular rhythm and normal heart sounds.    Pulmonary/Chest: Effort normal and breath sounds normal. She exhibits tenderness.   Tender to palpation overlying sternum.   Lymphadenopathy:     She has no cervical adenopathy.   Neurological: She is alert and oriented to person, place, and time.   Skin: Skin is warm and dry.   Psychiatric: She has a normal mood and affect.        Assessment/Plan     Diagnoses and all orders for this visit:    Costochondritis  -     MethylPREDNISolone (MEDROL, JOSEP,) 4 MG tablet; Take as directed on package instructions.  -     methylPREDNISolone sodium succinate (SOLU-Medrol) injection 125 mg; Infuse 2 mL into a venous catheter 1 (One) Time.    Other orders  -     pantoprazole (PROTONIX) 40 MG EC tablet; Take 1 tablet by mouth Daily. 30 minutes before breakfast.    Costochondritis versus pleuritis.  We discussed warning signs and symptoms of cardiac causes of chest pain as well.  Follow-up with Dr. Anne if no improvement of symptoms within a few days. Patient verbalized understanding.    Mariama Linder PA-C  02/07/2018         Please note that portions of this note were completed with a voice recognition program. Efforts were made to edit dictation, but occasionally words are mistranscribed.

## 2018-03-16 ENCOUNTER — RESULTS ENCOUNTER (OUTPATIENT)
Dept: FAMILY MEDICINE CLINIC | Facility: CLINIC | Age: 53
End: 2018-03-16

## 2018-03-16 DIAGNOSIS — E78.2 MIXED HYPERLIPIDEMIA: ICD-10-CM

## 2018-09-14 ENCOUNTER — RESULTS ENCOUNTER (OUTPATIENT)
Dept: FAMILY MEDICINE CLINIC | Facility: CLINIC | Age: 53
End: 2018-09-14

## 2018-09-14 DIAGNOSIS — E78.2 MIXED HYPERLIPIDEMIA: ICD-10-CM

## 2018-10-18 ENCOUNTER — TELEPHONE (OUTPATIENT)
Dept: INTERNAL MEDICINE | Facility: CLINIC | Age: 53
End: 2018-10-18

## 2018-10-18 RX ORDER — OSELTAMIVIR PHOSPHATE 75 MG/1
75 CAPSULE ORAL DAILY
Qty: 10 CAPSULE | Refills: 0 | Status: SHIPPED | OUTPATIENT
Start: 2018-10-18 | End: 2018-12-19 | Stop reason: SDUPTHER

## 2018-12-19 ENCOUNTER — TELEPHONE (OUTPATIENT)
Dept: INTERNAL MEDICINE | Facility: CLINIC | Age: 53
End: 2018-12-19

## 2018-12-19 RX ORDER — OSELTAMIVIR PHOSPHATE 75 MG/1
75 CAPSULE ORAL DAILY
Qty: 10 CAPSULE | Refills: 0 | Status: SHIPPED | OUTPATIENT
Start: 2018-12-19 | End: 2021-07-01

## 2018-12-19 NOTE — TELEPHONE ENCOUNTER
Patient's  calling stating that their daughter has the flu and patient would like a prescription of Tamiflu for prevention. Please advise. Confirmed pharmacy is [facility] on [address] There has also been a telephone note added to patient 's file as he has also requested Tamiflu for himself. His call back number is [phone]

## 2019-03-15 ENCOUNTER — RESULTS ENCOUNTER (OUTPATIENT)
Dept: FAMILY MEDICINE CLINIC | Facility: CLINIC | Age: 54
End: 2019-03-15

## 2019-03-15 DIAGNOSIS — E78.2 MIXED HYPERLIPIDEMIA: ICD-10-CM

## 2019-09-13 ENCOUNTER — RESULTS ENCOUNTER (OUTPATIENT)
Dept: FAMILY MEDICINE CLINIC | Facility: CLINIC | Age: 54
End: 2019-09-13

## 2019-09-13 DIAGNOSIS — E78.2 MIXED HYPERLIPIDEMIA: ICD-10-CM

## 2020-03-13 ENCOUNTER — RESULTS ENCOUNTER (OUTPATIENT)
Dept: FAMILY MEDICINE CLINIC | Facility: CLINIC | Age: 55
End: 2020-03-13

## 2020-03-13 DIAGNOSIS — E78.2 MIXED HYPERLIPIDEMIA: ICD-10-CM

## 2020-12-30 ENCOUNTER — HOSPITAL ENCOUNTER (EMERGENCY)
Facility: HOSPITAL | Age: 55
Discharge: HOME | End: 2020-12-30
Attending: EMERGENCY MEDICINE | Admitting: EMERGENCY MEDICINE

## 2020-12-30 ENCOUNTER — APPOINTMENT (OUTPATIENT)
Dept: CT IMAGING | Facility: HOSPITAL | Age: 55
End: 2020-12-30

## 2020-12-30 ENCOUNTER — TELEPHONE (OUTPATIENT)
Dept: INTERNAL MEDICINE | Facility: CLINIC | Age: 55
End: 2020-12-30

## 2020-12-30 VITALS
BODY MASS INDEX: 23.32 KG/M2 | HEART RATE: 72 BPM | TEMPERATURE: 98.2 F | RESPIRATION RATE: 17 BRPM | HEIGHT: 64 IN | WEIGHT: 136.6 LBS | DIASTOLIC BLOOD PRESSURE: 83 MMHG | OXYGEN SATURATION: 100 % | SYSTOLIC BLOOD PRESSURE: 137 MMHG

## 2020-12-30 DIAGNOSIS — R68.84 JAW PAIN: Primary | ICD-10-CM

## 2020-12-30 PROCEDURE — 99282 EMERGENCY DEPT VISIT SF MDM: CPT

## 2020-12-30 PROCEDURE — 70486 CT MAXILLOFACIAL W/O DYE: CPT

## 2020-12-30 RX ORDER — PREDNISONE 20 MG/1
20 TABLET ORAL DAILY
Qty: 5 TABLET | Refills: 0 | Status: SHIPPED | OUTPATIENT
Start: 2020-12-30 | End: 2021-07-01

## 2020-12-30 RX ORDER — CYCLOBENZAPRINE HCL 10 MG
10 TABLET ORAL 2 TIMES DAILY PRN
Qty: 20 TABLET | Refills: 0 | Status: SHIPPED | OUTPATIENT
Start: 2020-12-30 | End: 2021-07-01

## 2020-12-30 RX ORDER — INDOMETHACIN 50 MG/1
50 CAPSULE ORAL 2 TIMES DAILY WITH MEALS
Qty: 10 CAPSULE | Refills: 0 | Status: SHIPPED | OUTPATIENT
Start: 2020-12-30 | End: 2021-07-01

## 2020-12-30 NOTE — TELEPHONE ENCOUNTER
TESHAI  pt stated she was playing with her granddaughter pretending to bite her when her jaw popped this was on Monday she is unable to chew or open her mouth due to pain and swelling. Pt advised to go to ER for further evaluation, she verbalized understanding

## 2021-01-15 ENCOUNTER — TELEPHONE (OUTPATIENT)
Dept: INTERNAL MEDICINE | Facility: CLINIC | Age: 56
End: 2021-01-15

## 2021-07-01 ENCOUNTER — OFFICE VISIT (OUTPATIENT)
Dept: INTERNAL MEDICINE | Facility: CLINIC | Age: 56
End: 2021-07-01

## 2021-07-01 ENCOUNTER — HOSPITAL ENCOUNTER (OUTPATIENT)
Dept: GENERAL RADIOLOGY | Facility: HOSPITAL | Age: 56
Discharge: HOME | End: 2021-07-01
Admitting: INTERNAL MEDICINE

## 2021-07-01 VITALS
HEART RATE: 69 BPM | TEMPERATURE: 97.3 F | RESPIRATION RATE: 16 BRPM | DIASTOLIC BLOOD PRESSURE: 71 MMHG | WEIGHT: 136 LBS | BODY MASS INDEX: 23.22 KG/M2 | SYSTOLIC BLOOD PRESSURE: 134 MMHG | HEIGHT: 64 IN | OXYGEN SATURATION: 98 %

## 2021-07-01 DIAGNOSIS — D51.0 PERNICIOUS ANEMIA: ICD-10-CM

## 2021-07-01 DIAGNOSIS — Z12.11 COLON CANCER SCREENING: ICD-10-CM

## 2021-07-01 DIAGNOSIS — E78.2 MIXED HYPERLIPIDEMIA: Primary | ICD-10-CM

## 2021-07-01 DIAGNOSIS — M21.611 BUNION OF GREAT TOE OF RIGHT FOOT: ICD-10-CM

## 2021-07-01 DIAGNOSIS — Z12.31 ENCOUNTER FOR SCREENING MAMMOGRAM FOR MALIGNANT NEOPLASM OF BREAST: ICD-10-CM

## 2021-07-01 DIAGNOSIS — R53.81 MALAISE AND FATIGUE: ICD-10-CM

## 2021-07-01 DIAGNOSIS — G89.29 CHRONIC RIGHT SHOULDER PAIN: ICD-10-CM

## 2021-07-01 DIAGNOSIS — R53.83 MALAISE AND FATIGUE: ICD-10-CM

## 2021-07-01 DIAGNOSIS — M25.511 CHRONIC RIGHT SHOULDER PAIN: ICD-10-CM

## 2021-07-01 PROCEDURE — 99204 OFFICE O/P NEW MOD 45 MIN: CPT | Performed by: INTERNAL MEDICINE

## 2021-07-01 PROCEDURE — 73030 X-RAY EXAM OF SHOULDER: CPT

## 2021-07-01 NOTE — PROGRESS NOTES
Subjective   Ernestina Medina is a 56 y.o. female.     Chief Complaint   Patient presents with   • Establish Care     pt stopped coming in 2017   • Fatigue       History of Present Illness   Patient is here for initial visit, patient is here to follow-up on cholesterol and due for lab work, she complains of fatigue, she does have pernicious anemia and needs her labs to be checked, she also complains of increased restriction of right shoulder motion and she states she has pain when she raises her right shoulder and cannot raise it beyond 90 degrees, she denies any falls or injuries, she also complains of pain in her right foot and stated that she has a bunion, she has seen podiatry for it in the past and would like a referral for the same, she also due for mammogram and colonoscopy and she is agreeable for the same    Review of Systems   Constitutional: Positive for fatigue. Negative for appetite change and fever.   HENT: Negative for congestion, ear discharge, ear pain, sinus pressure and sore throat.    Eyes: Negative for pain and discharge.   Respiratory: Negative for cough, chest tightness, shortness of breath and wheezing.    Cardiovascular: Negative for chest pain, palpitations and leg swelling.   Gastrointestinal: Negative for abdominal pain, blood in stool, constipation, diarrhea and nausea.   Endocrine: Negative for cold intolerance and heat intolerance.   Genitourinary: Negative for dysuria, flank pain and frequency.   Musculoskeletal: Positive for arthralgias. Negative for back pain and joint swelling.   Skin: Negative for color change.   Allergic/Immunologic: Negative for environmental allergies and food allergies.   Neurological: Negative for dizziness, weakness, numbness and headaches.   Hematological: Negative for adenopathy. Does not bruise/bleed easily.   Psychiatric/Behavioral: Negative for behavioral problems and dysphoric mood. The patient is not nervous/anxious.        Past Medical  "History:   Diagnosis Date   • Abdominal pain    • Anxiety    • Asthma    • Elbow pain    • Endometriosis    • Fatigue    • Herpes labialis    • Joint pain of ankle and foot    • Leukopenia    • Migraine    • Ovarian cyst    • Pain in left knee    • Pain in limb    • Paresthesia    • Pernicious anemia    • Plantar fasciitis    • Polycystic ovarian syndrome    • PONV (postoperative nausea and vomiting)    • Recurrent aphthous stomatitis    • Tattoo    • Vitamin B12 deficiency        Past Surgical History:   Procedure Laterality Date   • CHOLECYSTECTOMY  1990   • ENDOSCOPY N/A 6/2/2017    Procedure: ESOPHAGOGASTRODUODENOSCOPY WITH BIOPSIES AND COLD BIOPSY POLYPECTOMY;  Surgeon: Yovany Pacheco MD;  Location: [facility] ENDOSCOPY;  Service:    • FOOT SURGERY Right 2015   • HYSTERECTOMY     • OTHER SURGICAL HISTORY      CYST FROM LEFT OVARY   • UPPER GASTROINTESTINAL ENDOSCOPY  06/02/2017   • WISDOM TOOTH EXTRACTION         Family History   Problem Relation Age of Onset   • Arthritis Mother    • Hypertension Mother    • Migraines Mother    • Other Father         arteriosclerotic cardiovascular disease   • Stroke Father    • Stroke Brother    • Hypertension Other         sibling   • Colon cancer Neg Hx         reports that she has never smoked. She has never used smokeless tobacco. She reports current alcohol use. She reports that she does not use drugs.    Allergies   Allergen Reactions   • Buspar [Buspirone] Nausea And Vomiting   • Compazine [Prochlorperazine] Other (See Comments)     \"IT MAKES MY JAW LOCK-UP\"         No current outpatient medications on file.  No current facility-administered medications for this visit.      Objective   Blood pressure 134/71, pulse 69, temperature 97.3 °F (36.3 °C), temperature source Temporal, resp. rate 16, height 162.6 cm (64\"), weight 61.7 kg (136 lb), SpO2 98 %.    Physical Exam  Vitals and nursing note reviewed.   Constitutional:       General: She is not in acute distress.     " Appearance: Normal appearance. She is not diaphoretic.   HENT:      Head: Normocephalic and atraumatic.      Right Ear: External ear normal.      Left Ear: External ear normal.      Nose: Nose normal.   Eyes:      Extraocular Movements: Extraocular movements intact.      Conjunctiva/sclera: Conjunctivae normal.   Neck:      Trachea: Trachea normal.   Cardiovascular:      Rate and Rhythm: Normal rate and regular rhythm.      Heart sounds: Normal heart sounds.   Pulmonary:      Effort: Pulmonary effort is normal. No respiratory distress.      Breath sounds: Normal breath sounds.   Abdominal:      General: Abdomen is flat.   Musculoskeletal:         General: Tenderness and deformity present.      Cervical back: Neck supple.      Comments: Moves all limbs  Right shoulder restriction of motion  Right big toe bunion   Skin:     General: Skin is warm.   Neurological:      Mental Status: She is alert and oriented to person, place, and time.      Comments: No gross motor or sensory deficits         Patient's Body mass index is 23.34 kg/m². indicating that she is within normal range (BMI 18.5-24.9). No BMI management plan needed..      Results for orders placed or performed during the hospital encounter of 06/02/17   Tissue Exam    Specimen: A: Small Intestine, Duodenum; Tissue    B: Stomach; Tissue    C: Stomach; Tissue    D: Stomach; Tissue   Result Value Ref Range    Case Report       Surgical Pathology Report                         Case: [id number]                                  Authorizing Provider:  Yovany Pacheco MD          Collected:           06/02/2017 08:44 AM          Ordering Location:     [facility]    Received:            06/02/2017 09:31 AM                                 SURG ENDO                                                                    Pathologist:           Porfirio Montejo MD                                                      Specimens:   1) - Small Intestine, Duodenum, 2ND  PORTION BIOPSIES                                                2) - Stomach, ANTRUM, BODY, ANGULUS BX'S                                                            3) - Stomach, CARDIA POLYP                                                                          4) - Stomach, CARDIA  POLYPOID AREA BX'S                                                   Final Diagnosis       See Scanned Report        Embedded Images           Assessment/Plan   Diagnoses and all orders for this visit:    1. Mixed hyperlipidemia (Primary)  -     CBC & Differential  -     Comprehensive Metabolic Panel  -     Lipid Panel    2. Chronic right shoulder pain  -     XR Shoulder 2+ View Right  -     Ambulatory Referral to Orthopedic Surgery    3. Pernicious anemia  -     Vitamin B12    4. Malaise and fatigue  -     TSH    5. Colon cancer screening  -     Ambulatory Referral to Gastroenterology    6. Bunion of great toe of right foot  -     Ambulatory Referral to Podiatry    7. Encounter for screening mammogram for malignant neoplasm of breast  -     Mammo Screening Digital Tomosynthesis Bilateral With CAD    Plan:  1.  mixed hyperlipidemia: will obtain   fasting CMP and lipid panel.  Diet and exercise counseled,   2. Right shoulder pain: We will get x-rays and refer patient to orthopedist  3. Fatigue: We will obtain labs   4. Pernicious anemia: We will obtain labs  5. Colon cancer screening: We will refer patient to GI for colonoscopy  6. Screening for breast cancer : We will schedule mammogram  7. right foot bunion  : We will refer patient to podiatry  I  spent  45 minutes caring for Ernestina on this date of service. This time includes time spent by me in the following activities:preparing for the visit, reviewing tests, performing a medically appropriate examination and/or evaluation , counseling and educating the patient/family/caregiver, ordering medications, tests, or procedures and documenting information in the medical record       Uma  MD Omid

## 2021-07-01 NOTE — PATIENT INSTRUCTIONS
MyPlate from USDA    MyPlate is an outline of a general healthy diet based on the 2010 Dietary Guidelines for Americans, from the U.S. Department of Agriculture (USDA). It sets guidelines for how much food you should eat from each food group based on your age, sex, and level of physical activity.  What are tips for following MyPlate?  To follow MyPlate recommendations:  · Eat a wide variety of fruits and vegetables, grains, and protein foods.  · Serve smaller portions and eat less food throughout the day.  · Limit portion sizes to avoid overeating.  · Enjoy your food.  · Get at least 150 minutes of exercise every week. This is about 30 minutes each day, 5 or more days per week.  It can be difficult to have every meal look like MyPlate. Think about MyPlate as eating guidelines for an entire day, rather than each individual meal.  Fruits and vegetables  · Make half of your plate fruits and vegetables.  · Eat many different colors of fruits and vegetables each day.  · For a 2,000 calorie daily food plan, eat:  ? 2½ cups of vegetables every day.  ? 2 cups of fruit every day.  · 1 cup is equal to:  ? 1 cup raw or cooked vegetables.  ? 1 cup raw fruit.  ? 1 medium-sized orange, apple, or banana.  ? 1 cup 100% fruit or vegetable juice.  ? 2 cups raw leafy greens, such as lettuce, spinach, or kale.  ? ½ cup dried fruit.  Grains  · One fourth of your plate should be grains.  · Make at least half of the grains you eat each day whole grains.  · For a 2,000 calorie daily food plan, eat 6 oz of grains every day.  · 1 oz is equal to:  ? 1 slice bread.  ? 1 cup cereal.  ? ½ cup cooked rice, cereal, or pasta.  Protein  · One fourth of your plate should be protein.  · Eat a wide variety of protein foods, including meat, poultry, fish, eggs, beans, nuts, and tofu.  · For a 2,000 calorie daily food plan, eat 5½ oz of protein every day.  · 1 oz is equal to:  ? 1 oz meat, poultry, or fish.  ? ¼ cup cooked beans.  ? 1 egg.  ? ½ oz nuts  or seeds.  ? 1 Tbsp peanut butter.  Dairy  · Drink fat-free or low-fat (1%) milk.  · Eat or drink dairy as a side to meals.  · For a 2,000 calorie daily food plan, eat or drink 3 cups of dairy every day.  · 1 cup is equal to:  ? 1 cup milk, yogurt, cottage cheese, or soy milk (soy beverage).  ? 2 oz processed cheese.  ? 1½ oz natural cheese.  Fats, oils, salt, and sugars  · Only small amounts of oils are recommended.  · Avoid foods that are high in calories and low in nutritional value (empty calories), like foods high in fat or added sugars.  · Choose foods that are low in salt (sodium). Choose foods that have less than 140 milligrams (mg) of sodium per serving.  · Drink water instead of sugary drinks. Drink enough water each day to keep your urine pale yellow.  Where to find support  · Work with your health care provider or a nutrition specialist (dietitian) to develop a customized eating plan that is right for you.  · Download an joseluis (mobile application) to help you track your daily food intake.  Where to find more information  · Go to ChooseMyPlate.gov for more information.  Summary  · MyPlate is a general guideline for healthy eating from the USDA. It is based on the 2010 Dietary Guidelines for Americans.  · In general, fruits and vegetables should take up ½ of your plate, grains should take up ¼ of your plate, and protein should take up ¼ of your plate.  This information is not intended to replace advice given to you by your health care provider. Make sure you discuss any questions you have with your health care provider.  Document Revised: 05/21/2020 Document Reviewed: 03/19/2018  Elsevier Patient Education © 2021 Else[employer] Inc.

## 2021-07-02 LAB
ALBUMIN SERPL-MCNC: 5.3 G/DL (ref 3.5–5.2)
ALBUMIN/GLOB SERPL: 2.3 G/DL
ALP SERPL-CCNC: 73 U/L (ref 39–117)
ALT SERPL-CCNC: 13 U/L (ref 1–33)
AST SERPL-CCNC: 18 U/L (ref 1–32)
BASOPHILS # BLD AUTO: 0.01 10*3/MM3 (ref 0–0.2)
BASOPHILS NFR BLD AUTO: 0.2 % (ref 0–1.5)
BILIRUB SERPL-MCNC: 0.7 MG/DL (ref 0–1.2)
BUN SERPL-MCNC: 16 MG/DL (ref 6–20)
BUN/CREAT SERPL: 22.5 (ref 7–25)
CALCIUM SERPL-MCNC: 9.9 MG/DL (ref 8.6–10.5)
CHLORIDE SERPL-SCNC: 103 MMOL/L (ref 98–107)
CHOLEST SERPL-MCNC: 231 MG/DL (ref 0–200)
CO2 SERPL-SCNC: 25.7 MMOL/L (ref 22–29)
CREAT SERPL-MCNC: 0.71 MG/DL (ref 0.57–1)
EOSINOPHIL # BLD AUTO: 0.05 10*3/MM3 (ref 0–0.4)
EOSINOPHIL NFR BLD AUTO: 1.2 % (ref 0.3–6.2)
ERYTHROCYTE [DISTWIDTH] IN BLOOD BY AUTOMATED COUNT: 11.9 % (ref 12.3–15.4)
GLOBULIN SER CALC-MCNC: 2.3 GM/DL
GLUCOSE SERPL-MCNC: 91 MG/DL (ref 65–99)
HCT VFR BLD AUTO: 40.3 % (ref 34–46.6)
HDLC SERPL-MCNC: 120 MG/DL (ref 40–60)
HGB BLD-MCNC: 13.3 G/DL (ref 12–15.9)
IMM GRANULOCYTES # BLD AUTO: 0.01 10*3/MM3 (ref 0–0.05)
IMM GRANULOCYTES NFR BLD AUTO: 0.2 % (ref 0–0.5)
LDLC SERPL CALC-MCNC: 102 MG/DL (ref 0–100)
LYMPHOCYTES # BLD AUTO: 1.57 10*3/MM3 (ref 0.7–3.1)
LYMPHOCYTES NFR BLD AUTO: 37.1 % (ref 19.6–45.3)
MCH RBC QN AUTO: 30.7 PG (ref 26.6–33)
MCHC RBC AUTO-ENTMCNC: 33 G/DL (ref 31.5–35.7)
MCV RBC AUTO: 93.1 FL (ref 79–97)
MONOCYTES # BLD AUTO: 0.27 10*3/MM3 (ref 0.1–0.9)
MONOCYTES NFR BLD AUTO: 6.4 % (ref 5–12)
NEUTROPHILS # BLD AUTO: 2.32 10*3/MM3 (ref 1.7–7)
NEUTROPHILS NFR BLD AUTO: 54.9 % (ref 42.7–76)
NRBC BLD AUTO-RTO: 0 /100 WBC (ref 0–0.2)
PLATELET # BLD AUTO: 260 10*3/MM3 (ref 140–450)
POTASSIUM SERPL-SCNC: 4 MMOL/L (ref 3.5–5.2)
PROT SERPL-MCNC: 7.6 G/DL (ref 6–8.5)
RBC # BLD AUTO: 4.33 10*6/MM3 (ref 3.77–5.28)
SODIUM SERPL-SCNC: 139 MMOL/L (ref 136–145)
TRIGL SERPL-MCNC: 51 MG/DL (ref 0–150)
TSH SERPL DL<=0.005 MIU/L-ACNC: 1.1 UIU/ML (ref 0.27–4.2)
VIT B12 SERPL-MCNC: 350 PG/ML (ref 211–946)
VLDLC SERPL CALC-MCNC: 9 MG/DL (ref 5–40)
WBC # BLD AUTO: 4.23 10*3/MM3 (ref 3.4–10.8)

## 2021-07-09 ENCOUNTER — CLINICAL SUPPORT (OUTPATIENT)
Dept: INTERNAL MEDICINE | Facility: CLINIC | Age: 56
End: 2021-07-09

## 2021-07-09 DIAGNOSIS — E53.8 VITAMIN B12 DEFICIENCY: Primary | ICD-10-CM

## 2021-07-09 PROCEDURE — 96372 THER/PROPH/DIAG INJ SC/IM: CPT | Performed by: INTERNAL MEDICINE

## 2021-07-09 RX ORDER — CYANOCOBALAMIN 1000 UG/ML
1000 INJECTION, SOLUTION INTRAMUSCULAR; SUBCUTANEOUS
Status: DISCONTINUED | OUTPATIENT
Start: 2021-07-09 | End: 2021-09-08

## 2021-07-09 RX ADMIN — CYANOCOBALAMIN 1000 MCG: 1000 INJECTION, SOLUTION INTRAMUSCULAR; SUBCUTANEOUS at 14:49

## 2021-07-14 ENCOUNTER — OFFICE VISIT (OUTPATIENT)
Dept: ORTHOPEDIC SURGERY | Facility: CLINIC | Age: 56
End: 2021-07-14

## 2021-07-14 VITALS
WEIGHT: 137 LBS | DIASTOLIC BLOOD PRESSURE: 68 MMHG | HEIGHT: 64 IN | SYSTOLIC BLOOD PRESSURE: 102 MMHG | TEMPERATURE: 97.1 F | BODY MASS INDEX: 23.39 KG/M2

## 2021-07-14 DIAGNOSIS — M25.511 ARTHRALGIA OF RIGHT SHOULDER REGION: Primary | ICD-10-CM

## 2021-07-14 DIAGNOSIS — M75.51 BURSITIS OF RIGHT SHOULDER: ICD-10-CM

## 2021-07-14 PROCEDURE — 99203 OFFICE O/P NEW LOW 30 MIN: CPT | Performed by: ORTHOPAEDIC SURGERY

## 2021-07-14 PROCEDURE — 20610 DRAIN/INJ JOINT/BURSA W/O US: CPT | Performed by: ORTHOPAEDIC SURGERY

## 2021-07-14 RX ORDER — LIDOCAINE HYDROCHLORIDE 10 MG/ML
2 INJECTION, SOLUTION INFILTRATION; PERINEURAL
Status: COMPLETED | OUTPATIENT
Start: 2021-07-14 | End: 2021-07-14

## 2021-07-14 RX ORDER — TRIAMCINOLONE ACETONIDE 40 MG/ML
40 INJECTION, SUSPENSION INTRA-ARTICULAR; INTRAMUSCULAR
Status: COMPLETED | OUTPATIENT
Start: 2021-07-14 | End: 2021-07-14

## 2021-07-14 RX ADMIN — TRIAMCINOLONE ACETONIDE 40 MG: 40 INJECTION, SUSPENSION INTRA-ARTICULAR; INTRAMUSCULAR at 14:37

## 2021-07-14 RX ADMIN — LIDOCAINE HYDROCHLORIDE 2 ML: 10 INJECTION, SOLUTION INFILTRATION; PERINEURAL at 14:37

## 2021-07-14 NOTE — PROGRESS NOTES
Subjective   Patient ID: Ernestina Medina is a 56 y.o. female  Pain of the Right Shoulder (Referred by Uma Anne MD for right shoulder pain x 2-3 mos, getting worse. No known injury, woke up one morning with pain. Aleve helps somewhat.)             History of Present Illness  56-year-old right-hand-dominant female who just woke up with shoulder pain 2 or 3 months ago does not recall any specific injury or repetitive motion that hurts anteriorly laterally hurts to lay on that side she is noted progressive loss of motion as well.  Denies history of prior shoulder injury right-sided neck pain or paresthesias or other joint arthralgias has been taking Aleve with slight improvement.      Review of Systems   Constitutional: Negative for fever.   HENT: Negative for dental problem and voice change.    Eyes: Negative for visual disturbance.   Respiratory: Negative for shortness of breath.    Cardiovascular: Negative for chest pain.   Gastrointestinal: Negative for abdominal pain.   Genitourinary: Negative for dysuria.   Musculoskeletal: Positive for arthralgias. Negative for gait problem and joint swelling.   Skin: Negative for rash.   Neurological: Negative for speech difficulty.   Hematological: Does not bruise/bleed easily.   Psychiatric/Behavioral: Negative for confusion.       Past Medical History:   Diagnosis Date   • Abdominal pain    • Anxiety    • Asthma    • Elbow pain    • Endometriosis    • Fatigue    • Herpes labialis    • Joint pain of ankle and foot    • Leukopenia    • Migraine    • Ovarian cyst    • Pain in left knee    • Pain in limb    • Paresthesia    • Pernicious anemia    • Plantar fasciitis    • Polycystic ovarian syndrome    • PONV (postoperative nausea and vomiting)    • Recurrent aphthous stomatitis    • Tattoo    • Vitamin B12 deficiency         Past Surgical History:   Procedure Laterality Date   • CHOLECYSTECTOMY  1990   • ENDOSCOPY N/A 6/2/2017    Procedure:  "ESOPHAGOGASTRODUODENOSCOPY WITH BIOPSIES AND COLD BIOPSY POLYPECTOMY;  Surgeon: Yovany Pacheco MD;  Location: [facility] ENDOSCOPY;  Service:    • FOOT SURGERY Right 2015   • HYSTERECTOMY     • OTHER SURGICAL HISTORY      CYST FROM LEFT OVARY   • UPPER GASTROINTESTINAL ENDOSCOPY  06/02/2017   • WISDOM TOOTH EXTRACTION         Family History   Problem Relation Age of Onset   • Arthritis Mother    • Hypertension Mother    • Migraines Mother    • Other Father         arteriosclerotic cardiovascular disease   • Stroke Father    • Stroke Brother    • Hypertension Other         sibling   • Colon cancer Neg Hx        Social History     Socioeconomic History   • Marital status:      Spouse name: Not on file   • Number of children: Not on file   • Years of education: Not on file   • Highest education level: Not on file   Tobacco Use   • Smoking status: Never Smoker   • Smokeless tobacco: Never Used   Substance and Sexual Activity   • Alcohol use: Yes     Comment: social   • Drug use: Never   • Sexual activity: Defer       I have reviewed the medical and surgical history, family history, social history, medications, and/or allergies, and the review of systems of this report.    Allergies   Allergen Reactions   • Buspar [Buspirone] Nausea And Vomiting   • Compazine [Prochlorperazine] Other (See Comments)     \"IT MAKES MY JAW LOCK-UP\"       No current outpatient medications on file.    Current Facility-Administered Medications:   •  cyanocobalamin injection 1,000 mcg, 1,000 mcg, Intramuscular, Q28 Days, Rosalia Waddell APRN, 1,000 mcg at 07/09/21 1449    Objective   /68   Temp 97.1 °F (36.2 °C)   Ht 162.6 cm (64\")   Wt 62.1 kg (137 lb)   BMI 23.52 kg/m²    Physical Exam  Constitutional: Patient is oriented to person, place, and time. Patient appears well-developed and well-nourished.   HENT:Head: Normocephalic and atraumatic.   Eyes: EOM are normal. Pupils are equal, round, and reactive to light.   Neck: Normal " range of motion. Neck supple.   Cardiovascular: Normal rate.    Pulmonary/Chest: Effort normal and breath sounds normal.   Abdominal: Soft.   Neurological: Patient is alert and oriented to person, place, and time.   Skin: Skin is warm and dry.   Psychiatric: Patient has a normal mood and affect.   Nursing note and vitals reviewed.       [unfilled]   Right shoulder: No atrophy no significant scapulothoracic dysfunction Spurling maneuver negative for arm pain.  Forward elevation limited to 130 abduction to 120 positive impingement sign negative drop arm sign no focal weakness negative subscap liftoff test no anterior apprehension findings--3 handbreadth loss of full internal rotation consistent with early adhesive capsulitis    Assessment/Plan   Review of Radiographic Studies:    Radiographic images today of affected area I personally viewed and showed no sign of acute fracture or dislocation.      Large Joint Arthrocentesis: R subacromial bursa  Date/Time: 7/14/2021 2:37 PM  Consent given by: patient  Site marked: site marked  Timeout: Immediately prior to procedure a time out was called to verify the correct patient, procedure, equipment, support staff and site/side marked as required   Supporting Documentation  Indications: pain   Procedure Details  Location: shoulder - R subacromial bursa  Preparation: Patient was prepped and draped in the usual sterile fashion  Needle size: 22 G  Medications administered: 40 mg triamcinolone acetonide 40 MG/ML; 2 mL lidocaine 1 %  Patient tolerance: patient tolerated the procedure well with no immediate complications           Diagnoses and all orders for this visit:    1. Arthralgia of right shoulder region (Primary)    2. Bursitis of right shoulder       Physical therapy referral given      Recommendations/Plan:   Referral: PT/OT 2-3 x wk x 4-6 wks    Patient agreeable to call or return sooner for any concerns.             Impression:  Right shoulder impingement with early  adhesive capsulitis  Plan:  Refer to therapy for mobilization home exercise recheck in 4 to 6 weeks if not improving at that point we will order MRI

## 2021-08-09 ENCOUNTER — CLINICAL SUPPORT (OUTPATIENT)
Dept: INTERNAL MEDICINE | Facility: CLINIC | Age: 56
End: 2021-08-09

## 2021-08-09 DIAGNOSIS — E53.8 VITAMIN B12 DEFICIENCY: ICD-10-CM

## 2021-08-09 PROCEDURE — 96372 THER/PROPH/DIAG INJ SC/IM: CPT | Performed by: INTERNAL MEDICINE

## 2021-08-09 RX ADMIN — CYANOCOBALAMIN 1000 MCG: 1000 INJECTION, SOLUTION INTRAMUSCULAR; SUBCUTANEOUS at 16:12

## 2021-09-08 ENCOUNTER — OFFICE VISIT (OUTPATIENT)
Dept: INTERNAL MEDICINE | Facility: CLINIC | Age: 56
End: 2021-09-08

## 2021-09-08 VITALS
HEIGHT: 64 IN | HEART RATE: 78 BPM | DIASTOLIC BLOOD PRESSURE: 78 MMHG | OXYGEN SATURATION: 96 % | TEMPERATURE: 97.7 F | SYSTOLIC BLOOD PRESSURE: 130 MMHG | BODY MASS INDEX: 22.5 KG/M2 | WEIGHT: 131.8 LBS

## 2021-09-08 DIAGNOSIS — R51.9 CHRONIC DAILY HEADACHE: ICD-10-CM

## 2021-09-08 DIAGNOSIS — R07.9 CHEST PAIN, UNSPECIFIED TYPE: Primary | ICD-10-CM

## 2021-09-08 DIAGNOSIS — R00.2 PALPITATIONS: ICD-10-CM

## 2021-09-08 DIAGNOSIS — M25.50 PAIN IN JOINT INVOLVING MULTIPLE SITES: ICD-10-CM

## 2021-09-08 DIAGNOSIS — D51.8 OTHER VITAMIN B12 DEFICIENCY ANEMIA: ICD-10-CM

## 2021-09-08 DIAGNOSIS — M79.2 NEURALGIA: ICD-10-CM

## 2021-09-08 DIAGNOSIS — D51.0 PERNICIOUS ANEMIA: ICD-10-CM

## 2021-09-08 PROCEDURE — 93000 ELECTROCARDIOGRAM COMPLETE: CPT | Performed by: INTERNAL MEDICINE

## 2021-09-08 PROCEDURE — 99214 OFFICE O/P EST MOD 30 MIN: CPT | Performed by: INTERNAL MEDICINE

## 2021-09-08 PROCEDURE — 96372 THER/PROPH/DIAG INJ SC/IM: CPT | Performed by: INTERNAL MEDICINE

## 2021-09-08 RX ORDER — CYANOCOBALAMIN 1000 UG/ML
1000 INJECTION, SOLUTION INTRAMUSCULAR; SUBCUTANEOUS
Status: DISCONTINUED | OUTPATIENT
Start: 2021-09-08 | End: 2021-09-08

## 2021-09-08 RX ORDER — CYANOCOBALAMIN 1000 UG/ML
1000 INJECTION, SOLUTION INTRAMUSCULAR; SUBCUTANEOUS
Status: DISCONTINUED | OUTPATIENT
Start: 2021-09-08 | End: 2021-11-01

## 2021-09-08 RX ADMIN — CYANOCOBALAMIN 1000 MCG: 1000 INJECTION, SOLUTION INTRAMUSCULAR; SUBCUTANEOUS at 18:15

## 2021-09-08 NOTE — PROGRESS NOTES
"Chief Complaint  Numbness (numbness in finger tips, joint / muscle pain, ), Palpitations (feels mor when laying flat ), Chest Pain, and Joint Pain    Subjective          Ernestina Esteban Adam presents to [facility]  History of Present Illness  Patient is here complaining of chest pain, she states is mostly in the precordial area, nonradiating and at times sharp, she also feels that she is having palpitation with elevated heart rate especially when she is laying flat, she is also noticed that she has numbness in her fingers but worse in her feet, she also feels that she has discoloration in her toes, she also complains of headaches, she also complains of muscle pain and joint pain which is also worse when she is laying down, she had similar symptoms in 2017 and she was referred to neurology and rheumatology and unsure if she saw them at that time, patient does have pernicious anemia and has seen hematology in the past , she was taking B12 shots in the past but had stopped recently    Objective   Vital Signs:   /78   Pulse 78   Temp 97.7 °F (36.5 °C) (Infrared)   Ht 162.6 cm (64\")   Wt 59.8 kg (131 lb 12.8 oz)   SpO2 96%   BMI 22.62 kg/m²     Physical Exam  Vitals and nursing note reviewed.   Constitutional:       General: She is not in acute distress.     Appearance: Normal appearance. She is not diaphoretic.   HENT:      Head: Normocephalic and atraumatic.      Right Ear: External ear normal.      Left Ear: External ear normal.      Nose: Nose normal.   Eyes:      Extraocular Movements: Extraocular movements intact.      Conjunctiva/sclera: Conjunctivae normal.   Neck:      Trachea: Trachea normal.   Cardiovascular:      Rate and Rhythm: Normal rate and regular rhythm.      Heart sounds: Normal heart sounds.   Pulmonary:      Effort: Pulmonary effort is normal. No respiratory distress.      Breath sounds: Normal breath sounds.   Abdominal:      General: Abdomen is flat. "   Musculoskeletal:      Cervical back: Neck supple.      Comments: Moves all limbs   Skin:     General: Skin is warm.   Neurological:      Mental Status: She is alert and oriented to person, place, and time.      Comments: No gross motor or sensory deficits          Result Review :     Common labs    Common Labsle 7/1/21 7/1/21 7/1/21    1448 1448 1448   Glucose  91    BUN  16    Creatinine  0.71    eGFR Non  Am  86    eGFR African Am  104    Sodium  139    Potassium  4.0    Chloride  103    Calcium  9.9    Total Protein  7.6    Albumin  5.30 (A)    Total Bilirubin  0.7    Alkaline Phosphatase  73    AST (SGOT)  18    ALT (SGPT)  13    WBC 4.23     Hemoglobin 13.3     Hematocrit 40.3     Platelets 260     Total Cholesterol   231 (A)   Triglycerides   51   HDL Cholesterol   120 (A)   LDL Cholesterol    102 (A)   (A) Abnormal value       Comments are available for some flowsheets but are not being displayed.             ECG 12 Lead    Date/Time: 9/8/2021 4:17 PM  Performed by: Uma Anne MD  Authorized by: Uma Anne MD   Comparison: compared with previous ECG from 8/6/2014  Similar to previous ECG  Rhythm: sinus rhythm  Rate: normal  BPM: 73  Conduction: conduction normal  T Waves: T waves normal  QRS axis: normal  Other: no other findings    Clinical impression: normal ECG                    Assessment and Plan    Diagnoses and all orders for this visit:    1. Chest pain, unspecified type (Primary)  -     Ambulatory Referral to Cardiology  -     ECG 12 Lead    2. Palpitations  -     Ambulatory Referral to Cardiology  -     ECG 12 Lead    3. Neuralgia  -     MRI Brain Without Contrast  -     Ambulatory Referral to Neurology  -     US arterial doppler lower extremity  bilateral    4. Chronic daily headache  -     MRI Brain Without Contrast  -     Ambulatory Referral to Neurology    5. Pernicious anemia  -     Discontinue: cyanocobalamin injection 1,000 mcg  -     cyanocobalamin injection 1,000 mcg    6.  Pain in joint involving multiple sites  -     Ambulatory Referral to Rheumatology    plan:  1.  Chest pain: EKG done in office and reviewed with patient, will refer patient to cardiology, labs reviewed  2.  Palpitations: Labs reviewed, EKG done in office and reviewed with patient , will refer patient to cardiology  3.  Daily headache: We will obtain MRI brain and refer to neurology  4.  Neuralgia: We will obtain MRI brain, arterial Dopplers and refer patient to neurology  5.  Pernicious anemia: Seen by hematology: We will restart B12 injections  6.  Multiple joint pains: Refer patient to rheumatology  I spent 30 minutes caring for Ernestina on this date of service. This time includes time spent by me in the following activities:preparing for the visit, reviewing tests, performing a medically appropriate examination and/or evaluation , counseling and educating the patient/family/caregiver, ordering medications, tests, or procedures, documenting information in the medical record and care coordination  Follow Up    Patient was given instructions and counseling regarding her condition or for health maintenance advice. Please see specific information pulled into the AVS if appropriate.

## 2021-09-16 ENCOUNTER — CLINICAL SUPPORT (OUTPATIENT)
Dept: INTERNAL MEDICINE | Facility: CLINIC | Age: 56
End: 2021-09-16

## 2021-09-16 DIAGNOSIS — E53.8 COBALAMIN DEFICIENCY: ICD-10-CM

## 2021-09-16 PROCEDURE — 96372 THER/PROPH/DIAG INJ SC/IM: CPT | Performed by: INTERNAL MEDICINE

## 2021-09-16 RX ADMIN — CYANOCOBALAMIN 1000 MCG: 1000 INJECTION, SOLUTION INTRAMUSCULAR; SUBCUTANEOUS at 14:28

## 2021-09-23 ENCOUNTER — CLINICAL SUPPORT (OUTPATIENT)
Dept: INTERNAL MEDICINE | Facility: CLINIC | Age: 56
End: 2021-09-23

## 2021-09-23 DIAGNOSIS — D51.8 OTHER VITAMIN B12 DEFICIENCY ANEMIA: ICD-10-CM

## 2021-09-23 PROCEDURE — 96372 THER/PROPH/DIAG INJ SC/IM: CPT | Performed by: INTERNAL MEDICINE

## 2021-09-23 RX ADMIN — CYANOCOBALAMIN 1000 MCG: 1000 INJECTION, SOLUTION INTRAMUSCULAR; SUBCUTANEOUS at 14:37

## 2021-09-27 ENCOUNTER — HOSPITAL ENCOUNTER (OUTPATIENT)
Dept: MRI IMAGING | Facility: HOSPITAL | Age: 56
Discharge: HOME | End: 2021-09-27

## 2021-09-27 ENCOUNTER — HOSPITAL ENCOUNTER (OUTPATIENT)
Dept: ULTRASOUND IMAGING | Facility: HOSPITAL | Age: 56
Discharge: HOME | End: 2021-09-27

## 2021-09-27 PROCEDURE — 93923 UPR/LXTR ART STDY 3+ LVLS: CPT

## 2021-09-27 PROCEDURE — 70551 MRI BRAIN STEM W/O DYE: CPT

## 2021-09-29 ENCOUNTER — OFFICE VISIT (OUTPATIENT)
Dept: CARDIOLOGY | Facility: CLINIC | Age: 56
End: 2021-09-29

## 2021-09-29 VITALS
SYSTOLIC BLOOD PRESSURE: 120 MMHG | HEART RATE: 69 BPM | OXYGEN SATURATION: 95 % | BODY MASS INDEX: 22.88 KG/M2 | HEIGHT: 64 IN | WEIGHT: 134 LBS | DIASTOLIC BLOOD PRESSURE: 82 MMHG

## 2021-09-29 DIAGNOSIS — R00.2 PALPITATIONS: Primary | ICD-10-CM

## 2021-09-29 PROCEDURE — 99244 OFF/OP CNSLTJ NEW/EST MOD 40: CPT | Performed by: INTERNAL MEDICINE

## 2021-09-29 NOTE — PROGRESS NOTES
"     [facility] OP Consult Note    Ernestina Medina  [id number]  2021    Referred By: Uma Anne MD    Chief Complaint: Palpitations    History of Present Illness:   Mrs. Ernestina Medina is a 56 y.o. female who presents to the Cardiology Clinic for evaluation of palpitations.  The patient does not have any significant past medical or cardiac history.  She reports a several week history of palpitations.  She describes her palpitations as a brief \"fluttering\" sensation which will last several seconds before resolving spontaneously.  No sustained episodes.  Currently, she is having multiple episodes per day.  She denies any associated chest pain or chest discomfort.  No presyncopal or syncopal events.  She has not identified any clear aggravating or alleviating factors.  No history of orthopnea, PND, or lower extremity swelling.  No other specific complaints at this time.    Past Cardiac Testin. Last Coronary Angio: None  2. Prior Stress Testing: None  3. Last Echo: None  4. Prior Holter Monitor: None    Review of Systems:   Review of Systems   Constitutional: Negative for activity change, appetite change, chills, diaphoresis, fatigue, fever, unexpected weight gain and unexpected weight loss.   Eyes: Negative for blurred vision and double vision.   Respiratory: Negative for cough, chest tightness, shortness of breath and wheezing.    Cardiovascular: Positive for palpitations. Negative for chest pain and leg swelling.   Gastrointestinal: Negative for abdominal pain, anal bleeding, blood in stool and GERD.   Endocrine: Negative for cold intolerance and heat intolerance.   Genitourinary: Negative for hematuria.   Neurological: Negative for dizziness, syncope, weakness and light-headedness.   Hematological: Does not bruise/bleed easily.   Psychiatric/Behavioral: Negative for depressed mood and stress. The patient is not nervous/anxious.        Past Medical History: "   Past Medical History:   Diagnosis Date   • Abdominal pain    • Anxiety    • Asthma    • Elbow pain    • Endometriosis    • Fatigue    • Herpes labialis    • Joint pain of ankle and foot    • Leukopenia    • Migraine    • Ovarian cyst    • Pain in left knee    • Pain in limb    • Paresthesia    • Pernicious anemia    • Plantar fasciitis    • Polycystic ovarian syndrome    • PONV (postoperative nausea and vomiting)    • Recurrent aphthous stomatitis    • Tattoo    • Vitamin B12 deficiency        Past Surgical History:   Past Surgical History:   Procedure Laterality Date   • BREAST SURGERY  2009   • CHOLECYSTECTOMY  1990   • ENDOSCOPY N/A 6/2/2017    Procedure: ESOPHAGOGASTRODUODENOSCOPY WITH BIOPSIES AND COLD BIOPSY POLYPECTOMY;  Surgeon: Yovany Pacheco MD;  Location: [facility] ENDOSCOPY;  Service:    • FOOT SURGERY Right 2015   • HYSTERECTOMY     • OTHER SURGICAL HISTORY      CYST FROM LEFT OVARY   • UPPER GASTROINTESTINAL ENDOSCOPY  06/02/2017   • WISDOM TOOTH EXTRACTION         Family History:   Family History   Problem Relation Age of Onset   • Arthritis Mother    • Hypertension Mother    • Migraines Mother    • Other Father         arteriosclerotic cardiovascular disease   • Stroke Father    • Stroke Brother    • Hypertension Other         sibling   • Colon cancer Neg Hx        Social History:   Social History     Socioeconomic History   • Marital status:      Spouse name: Not on file   • Number of children: Not on file   • Years of education: Not on file   • Highest education level: Not on file   Tobacco Use   • Smoking status: Never Smoker   • Smokeless tobacco: Never Used   Substance and Sexual Activity   • Alcohol use: Yes     Comment: social   • Drug use: Never   • Sexual activity: Defer       Medications:   No current outpatient medications on file.    Current Facility-Administered Medications:   •  cyanocobalamin injection 1,000 mcg, 1,000 mcg, Intramuscular, Q7 Days, Uma Anne MD, 1,000 mcg at  "09/23/21 1437    Allergies:   Allergies   Allergen Reactions   • Buspar [Buspirone] Nausea And Vomiting   • Compazine [Prochlorperazine] Other (See Comments)     \"IT MAKES MY JAW LOCK-UP\"       Physical Exam:  Vital Signs:   Vitals:    09/29/21 1347 09/29/21 1350   BP: 120/82 120/82   BP Location: Right arm Left arm   Patient Position: Sitting Sitting   Cuff Size: Adult Adult   Pulse: 69    SpO2: 95%    Weight: 60.8 kg (134 lb)    Height: 162.6 cm (64\")        Physical Exam  Constitutional:       General: She is not in acute distress.     Appearance: Normal appearance. She is well-developed. She is not diaphoretic.   HENT:      Head: Normocephalic and atraumatic.   Eyes:      General: No scleral icterus.     Pupils: Pupils are equal, round, and reactive to light.   Neck:      Trachea: No tracheal deviation.   Cardiovascular:      Rate and Rhythm: Normal rate and regular rhythm.      Heart sounds: Normal heart sounds. No murmur heard.   No friction rub. No gallop.       Comments: Normal JVD.  Pulmonary:      Effort: Pulmonary effort is normal. No respiratory distress.      Breath sounds: Normal breath sounds. No stridor. No wheezing or rales.   Chest:      Chest wall: No tenderness.   Abdominal:      General: Bowel sounds are normal. There is no distension.      Palpations: Abdomen is soft.      Tenderness: There is no abdominal tenderness. There is no guarding or rebound.   Musculoskeletal:         General: No swelling. Normal range of motion.      Cervical back: Neck supple. No tenderness.   Lymphadenopathy:      Cervical: No cervical adenopathy.   Skin:     General: Skin is warm and dry.      Findings: No erythema.   Neurological:      General: No focal deficit present.      Mental Status: She is alert and oriented to person, place, and time.   Psychiatric:         Mood and Affect: Mood normal.         Behavior: Behavior normal.         Results Review:   I reviewed the patient's new clinical results.    ECG " 9/8/2021: Sinus rhythm at 73 bpm.  Normal ECG.     Assessment / Plan:     1. Palpitations  --Frequent of palpitations, symptoms somewhat suggestive of symptomatic ectopy  --Recent ECG showed NSR, normal atrioventricular conduction  --Last TSH was within normal limits  --Will proceed with 48-hour outpatient cardiac monitoring to assess for ectopy, rule out arrhythmia  --If symptomatic ectopy documented on cardiac monitoring, would consider low-dose metoprolol for suppression  --Follow-up in 1 month to review results of cardiac monitoring        Follow Up:   Return in about 4 weeks (around 10/27/2021).      Thank you for allowing me to participate in the care of your patient. Please to not hesitate to contact me with additional questions or concerns.     KEVIN Oglesby MD  Interventional Cardiology   09/29/2021  15:59 EDT

## 2021-09-30 ENCOUNTER — CLINICAL SUPPORT (OUTPATIENT)
Dept: INTERNAL MEDICINE | Facility: CLINIC | Age: 56
End: 2021-09-30

## 2021-09-30 DIAGNOSIS — D51.0 PERNICIOUS ANEMIA: ICD-10-CM

## 2021-09-30 PROCEDURE — 96372 THER/PROPH/DIAG INJ SC/IM: CPT | Performed by: INTERNAL MEDICINE

## 2021-09-30 RX ADMIN — CYANOCOBALAMIN 1000 MCG: 1000 INJECTION, SOLUTION INTRAMUSCULAR; SUBCUTANEOUS at 14:28

## 2021-10-01 ENCOUNTER — APPOINTMENT (OUTPATIENT)
Dept: ULTRASOUND IMAGING | Facility: HOSPITAL | Age: 56
End: 2021-10-01

## 2021-10-13 ENCOUNTER — CLINICAL SUPPORT (OUTPATIENT)
Dept: INTERNAL MEDICINE | Facility: CLINIC | Age: 56
End: 2021-10-13

## 2021-10-13 DIAGNOSIS — E53.8 COBALAMIN DEFICIENCY: ICD-10-CM

## 2021-10-13 PROCEDURE — 96372 THER/PROPH/DIAG INJ SC/IM: CPT | Performed by: INTERNAL MEDICINE

## 2021-10-13 RX ADMIN — CYANOCOBALAMIN 1000 MCG: 1000 INJECTION, SOLUTION INTRAMUSCULAR; SUBCUTANEOUS at 15:42

## 2021-11-01 ENCOUNTER — OFFICE VISIT (OUTPATIENT)
Dept: INTERNAL MEDICINE | Facility: CLINIC | Age: 56
End: 2021-11-01

## 2021-11-01 VITALS
BODY MASS INDEX: 23.22 KG/M2 | OXYGEN SATURATION: 95 % | WEIGHT: 136 LBS | DIASTOLIC BLOOD PRESSURE: 76 MMHG | TEMPERATURE: 97.5 F | HEIGHT: 64 IN | SYSTOLIC BLOOD PRESSURE: 128 MMHG | RESPIRATION RATE: 14 BRPM | HEART RATE: 65 BPM

## 2021-11-01 DIAGNOSIS — R00.2 PALPITATIONS: ICD-10-CM

## 2021-11-01 DIAGNOSIS — E53.8 COBALAMIN DEFICIENCY: ICD-10-CM

## 2021-11-01 DIAGNOSIS — D51.0 PERNICIOUS ANEMIA: ICD-10-CM

## 2021-11-01 DIAGNOSIS — F41.9 ANXIETY: ICD-10-CM

## 2021-11-01 DIAGNOSIS — R51.9 CHRONIC DAILY HEADACHE: ICD-10-CM

## 2021-11-01 DIAGNOSIS — F51.04 INSOMNIA, PSYCHOPHYSIOLOGICAL: ICD-10-CM

## 2021-11-01 DIAGNOSIS — F33.0 MILD EPISODE OF RECURRENT MAJOR DEPRESSIVE DISORDER (HCC): ICD-10-CM

## 2021-11-01 DIAGNOSIS — R03.0 ELEVATED BLOOD PRESSURE READING WITHOUT DIAGNOSIS OF HYPERTENSION: Primary | ICD-10-CM

## 2021-11-01 PROCEDURE — 99214 OFFICE O/P EST MOD 30 MIN: CPT | Performed by: INTERNAL MEDICINE

## 2021-11-01 PROCEDURE — 96372 THER/PROPH/DIAG INJ SC/IM: CPT | Performed by: INTERNAL MEDICINE

## 2021-11-01 RX ORDER — CYANOCOBALAMIN 1000 UG/ML
1000 INJECTION, SOLUTION INTRAMUSCULAR; SUBCUTANEOUS
Status: SHIPPED | OUTPATIENT
Start: 2021-11-01 | End: 2022-04-18

## 2021-11-01 RX ORDER — AMITRIPTYLINE HYDROCHLORIDE 25 MG/1
25 TABLET, FILM COATED ORAL NIGHTLY
Qty: 30 TABLET | Refills: 5 | Status: SHIPPED | OUTPATIENT
Start: 2021-11-01 | End: 2022-01-11

## 2021-11-01 RX ORDER — ESCITALOPRAM OXALATE 10 MG/1
10 TABLET ORAL DAILY
Qty: 30 TABLET | Refills: 5 | Status: SHIPPED | OUTPATIENT
Start: 2021-11-01 | End: 2022-01-11 | Stop reason: SDUPTHER

## 2021-11-01 RX ADMIN — CYANOCOBALAMIN 1000 MCG: 1000 INJECTION, SOLUTION INTRAMUSCULAR; SUBCUTANEOUS at 12:40

## 2021-11-01 NOTE — PROGRESS NOTES
"Chief Complaint  Hyperlipidemia    Subjective          Ernestina Medina presents to [facility]  History of Present Illness  HPI: Patient is here to follow up on the blood pressure   which was initially noted to be elevated in the clinic today but on rechecking it has improved, patient is advised to monitor vitals, she is also to follow-up on palpitations, she was seen by cardiology and has a follow-up, she states her symptoms she has noticed are triggered by her anxiety, she also complains of depression and insomnia, she does state that she is stressed out, she needs a B12 shot, she also had labs for her cholesterol, she complains of chronic headaches for which she had imaging studies done which have been reviewed, she is due for neurology in January 2022 .  She needs a B12 shot    Objective   Vital Signs:   /76   Pulse 65   Temp 97.5 °F (36.4 °C)   Resp 14   Ht 162.6 cm (64.02\")   Wt 61.7 kg (136 lb)   SpO2 95%   BMI 23.33 kg/m²     Vitals:    11/01/21 1203 11/01/21 1236   BP: 158/84 128/76   Pulse: 65    Resp: 14    Temp: 97.5 °F (36.4 °C)    SpO2: 95%    Weight: 61.7 kg (136 lb)    Height: 162.6 cm (64.02\")    '  Physical Exam  Vitals and nursing note reviewed.   Constitutional:       General: She is not in acute distress.     Appearance: Normal appearance. She is not diaphoretic.   HENT:      Head: Normocephalic and atraumatic.      Right Ear: External ear normal.      Left Ear: External ear normal.      Nose: Nose normal.   Eyes:      Extraocular Movements: Extraocular movements intact.      Conjunctiva/sclera: Conjunctivae normal.   Neck:      Trachea: Trachea normal.   Cardiovascular:      Rate and Rhythm: Normal rate and regular rhythm.      Heart sounds: Normal heart sounds.   Pulmonary:      Effort: Pulmonary effort is normal. No respiratory distress.      Breath sounds: Normal breath sounds.   Abdominal:      General: Abdomen is flat.   Musculoskeletal: "      Cervical back: Neck supple.      Comments: Moves all limbs   Skin:     General: Skin is warm.   Neurological:      Mental Status: She is alert and oriented to person, place, and time.      Comments: No gross motor or sensory deficits        Result Review :     Common labs    Common Labsle 7/1/21 7/1/21 7/1/21    1448 1448 1448   Glucose  91    BUN  16    Creatinine  0.71    eGFR Non  Am  86    eGFR African Am  104    Sodium  139    Potassium  4.0    Chloride  103    Calcium  9.9    Total Protein  7.6    Albumin  5.30 (A)    Total Bilirubin  0.7    Alkaline Phosphatase  73    AST (SGOT)  18    ALT (SGPT)  13    WBC 4.23     Hemoglobin 13.3     Hematocrit 40.3     Platelets 260     Total Cholesterol   231 (A)   Triglycerides   51   HDL Cholesterol   120 (A)   LDL Cholesterol    102 (A)   (A) Abnormal value       Comments are available for some flowsheets but are not being displayed.                US arterial doppler lower extremity bilateral (09/27/2021 14:14)  MRI Brain Without Contrast (09/27/2021 13:46)       Assessment and Plan    Diagnoses and all orders for this visit:    1. Elevated blood pressure reading without diagnosis of hypertension (Primary)    2. Palpitations    3. Anxiety  -     escitalopram (Lexapro) 10 MG tablet; Take 1 tablet by mouth Daily.  Dispense: 30 tablet; Refill: 5    4. Mild episode of recurrent major depressive disorder (HCC)  -     escitalopram (Lexapro) 10 MG tablet; Take 1 tablet by mouth Daily.  Dispense: 30 tablet; Refill: 5    5. Insomnia, psychophysiological  -     amitriptyline (ELAVIL) 25 MG tablet; Take 1 tablet by mouth Every Night.  Dispense: 30 tablet; Refill: 5    6. Chronic daily headache  -     amitriptyline (ELAVIL) 25 MG tablet; Take 1 tablet by mouth Every Night.  Dispense: 30 tablet; Refill: 5    7. Pernicious anemia  -     cyanocobalamin injection 1,000 mcg    8. Cobalamin deficiency    Plan:    1.  Elevated blood pressure without diagnosis of  hypertension: Patient advised to monitor vitals and follow low-sodium diet  2.  Palpitations: We will monitor, labs reviewed, to follow-up with cardiology  3.  Anxiety disorder: We will give a trial with Lexapro 10 mg daily  4.  Major depression: Start Lexapro 10 mg daily  5.  Insomnia: We will give a trial with Elavil 25 mg p.o. nightly  6.  Chronic headaches: We will start Elavil 25 mg p.o. daily, MRI and ultrasound reviewed, keep neurology appointment  7.  Pernicious anemia: B12 IM given in office today  I spent 30 minutes caring for Ernestina on this date of service. This time includes time spent by me in the following activities:preparing for the visit, reviewing tests, performing a medically appropriate examination and/or evaluation , counseling and educating the patient/family/caregiver, ordering medications, tests, or procedures and documenting information in the medical record  Follow Up   Return in about 12 weeks (around 1/24/2022).  Patient was given instructions and counseling regarding her condition or for health maintenance advice. Please see specific information pulled into the AVS if appropriate.

## 2021-11-12 ENCOUNTER — OFFICE VISIT (OUTPATIENT)
Dept: CARDIOLOGY | Facility: CLINIC | Age: 56
End: 2021-11-12

## 2021-11-12 VITALS
SYSTOLIC BLOOD PRESSURE: 130 MMHG | OXYGEN SATURATION: 99 % | BODY MASS INDEX: 22.53 KG/M2 | DIASTOLIC BLOOD PRESSURE: 82 MMHG | WEIGHT: 132 LBS | HEART RATE: 62 BPM | HEIGHT: 64 IN | RESPIRATION RATE: 18 BRPM

## 2021-11-12 DIAGNOSIS — R00.2 PALPITATIONS: Primary | ICD-10-CM

## 2021-11-12 PROCEDURE — 99213 OFFICE O/P EST LOW 20 MIN: CPT | Performed by: INTERNAL MEDICINE

## 2021-11-29 ENCOUNTER — CLINICAL SUPPORT (OUTPATIENT)
Dept: INTERNAL MEDICINE | Facility: CLINIC | Age: 56
End: 2021-11-29

## 2021-11-29 DIAGNOSIS — E53.8 COBALAMIN DEFICIENCY: ICD-10-CM

## 2021-11-29 PROCEDURE — 96372 THER/PROPH/DIAG INJ SC/IM: CPT | Performed by: INTERNAL MEDICINE

## 2021-11-29 RX ADMIN — CYANOCOBALAMIN 1000 MCG: 1000 INJECTION, SOLUTION INTRAMUSCULAR; SUBCUTANEOUS at 14:27

## 2021-12-09 ENCOUNTER — PRE-ADMISSION TESTING (OUTPATIENT)
Dept: PREADMISSION TESTING | Facility: HOSPITAL | Age: 56
End: 2021-12-09

## 2021-12-09 ENCOUNTER — TELEPHONE (OUTPATIENT)
Dept: INTERNAL MEDICINE | Facility: CLINIC | Age: 56
End: 2021-12-09

## 2021-12-09 VITALS — HEIGHT: 64 IN | WEIGHT: 131.4 LBS | BODY MASS INDEX: 22.43 KG/M2

## 2021-12-09 LAB
ANION GAP SERPL CALCULATED.3IONS-SCNC: 10 MMOL/L (ref 5–15)
BUN SERPL-MCNC: 14 MG/DL (ref 6–20)
BUN/CREAT SERPL: 24.1 (ref 7–25)
CALCIUM SPEC-SCNC: 9.7 MG/DL (ref 8.6–10.5)
CHLORIDE SERPL-SCNC: 104 MMOL/L (ref 98–107)
CO2 SERPL-SCNC: 26 MMOL/L (ref 22–29)
CREAT SERPL-MCNC: 0.58 MG/DL (ref 0.57–1)
DEPRECATED RDW RBC AUTO: 44.8 FL (ref 37–54)
ERYTHROCYTE [DISTWIDTH] IN BLOOD BY AUTOMATED COUNT: 12.6 % (ref 12.3–15.4)
GFR SERPL CREATININE-BSD FRML MDRD: 108 ML/MIN/1.73
GLUCOSE SERPL-MCNC: 100 MG/DL (ref 65–99)
HCT VFR BLD AUTO: 38 % (ref 34–46.6)
HGB BLD-MCNC: 12.2 G/DL (ref 12–15.9)
MCH RBC QN AUTO: 31.4 PG (ref 26.6–33)
MCHC RBC AUTO-ENTMCNC: 32.1 G/DL (ref 31.5–35.7)
MCV RBC AUTO: 97.7 FL (ref 79–97)
PLATELET # BLD AUTO: 255 10*3/MM3 (ref 140–450)
PMV BLD AUTO: 9.4 FL (ref 6–12)
POTASSIUM SERPL-SCNC: 4.3 MMOL/L (ref 3.5–5.2)
RBC # BLD AUTO: 3.89 10*6/MM3 (ref 3.77–5.28)
SODIUM SERPL-SCNC: 140 MMOL/L (ref 136–145)
WBC NRBC COR # BLD: 4.79 10*3/MM3 (ref 3.4–10.8)

## 2021-12-09 PROCEDURE — 80048 BASIC METABOLIC PNL TOTAL CA: CPT

## 2021-12-09 PROCEDURE — 85027 COMPLETE CBC AUTOMATED: CPT

## 2021-12-09 PROCEDURE — 36415 COLL VENOUS BLD VENIPUNCTURE: CPT

## 2021-12-09 RX ORDER — NAPROXEN SODIUM 220 MG
220 TABLET ORAL 2 TIMES DAILY PRN
COMMUNITY
End: 2022-01-11

## 2021-12-09 RX ORDER — LANOLIN ALCOHOL/MO/W.PET/CERES
1000 CREAM (GRAM) TOPICAL DAILY
COMMUNITY
End: 2022-01-18 | Stop reason: ALTCHOICE

## 2021-12-09 NOTE — TELEPHONE ENCOUNTER
Hub staff attempted to follow warm transfer process and was unsuccessful     Caller: Ernestina Medina    Relationship to patient: Self    Best call back number: [phone] (H)    Patient is needing:   PATIENT STATES THAT SHE IS DUE TO HAVE SURGERY ON HER RIGHT FOOT FOR A JOINT REPLACEMENT 12/13/21. [facility] IS REQUESTING THAT THE FOLLOWING REFERRAL CODES BE PUT ONTO THE REFERRAL FOR THE INSURANCE TO APPROVE THE SURGERY, THERE ARE AS FOLLOWS:     [id number]  [id number]     PATIENT IS REQUESTING THAT A COURTSEY BE MADE TO [facility] TO ENSURE THAT THEY HAVE WHAT IS REQUIRED TO AVOID ANY DELAYS IN PATIENT RECEIVING THIS SURGERY AS IT HAS BEEN DELAYED ONCE DUE TO [facility]ANNIELLE CLAUDIO CONTACT: [phone]    PATIENT HAS BEEN ADVISED THAT THIS REQUEST HAS BEEN MARKED AS A HIGH PRIORITY, PLEASE ALLOW 48 HOURS FOR OUR CLINICAL TEAM TO FOLLOW UP ON THIS REQUEST.

## 2021-12-13 ENCOUNTER — ANESTHESIA EVENT (OUTPATIENT)
Dept: PERIOP | Facility: HOSPITAL | Age: 56
End: 2021-12-13

## 2021-12-13 ENCOUNTER — HOSPITAL ENCOUNTER (OUTPATIENT)
Facility: HOSPITAL | Age: 56
Setting detail: HOSPITAL OUTPATIENT SURGERY
Discharge: HOME | End: 2021-12-13
Attending: PODIATRIST | Admitting: PODIATRIST

## 2021-12-13 ENCOUNTER — APPOINTMENT (OUTPATIENT)
Dept: GENERAL RADIOLOGY | Facility: HOSPITAL | Age: 56
End: 2021-12-13

## 2021-12-13 ENCOUNTER — ANESTHESIA (OUTPATIENT)
Dept: PERIOP | Facility: HOSPITAL | Age: 56
End: 2021-12-13

## 2021-12-13 VITALS
OXYGEN SATURATION: 94 % | HEART RATE: 68 BPM | DIASTOLIC BLOOD PRESSURE: 86 MMHG | SYSTOLIC BLOOD PRESSURE: 138 MMHG | RESPIRATION RATE: 16 BRPM | TEMPERATURE: 97.7 F

## 2021-12-13 LAB — SARS-COV-2 RNA PNL SPEC NAA+PROBE: NOT DETECTED

## 2021-12-13 PROCEDURE — 25010000002 FENTANYL CITRATE (PF) 100 MCG/2ML SOLUTION: Performed by: NURSE ANESTHETIST, CERTIFIED REGISTERED

## 2021-12-13 PROCEDURE — 25010000002 DEXAMETHASONE PER 1 MG: Performed by: NURSE ANESTHETIST, CERTIFIED REGISTERED

## 2021-12-13 PROCEDURE — 94799 UNLISTED PULMONARY SVC/PX: CPT

## 2021-12-13 PROCEDURE — C1713 ANCHOR/SCREW BN/BN,TIS/BN: HCPCS | Performed by: PODIATRIST

## 2021-12-13 PROCEDURE — 0 CEFAZOLIN SODIUM-DEXTROSE 1-4 GM-%(50ML) RECONSTITUTED SOLUTION: Performed by: PODIATRIST

## 2021-12-13 PROCEDURE — C9803 HOPD COVID-19 SPEC COLLECT: HCPCS

## 2021-12-13 PROCEDURE — 25010000002 MIDAZOLAM PER 1MG: Performed by: NURSE ANESTHETIST, CERTIFIED REGISTERED

## 2021-12-13 PROCEDURE — 25010000002 PROPOFOL 200 MG/20ML EMULSION: Performed by: NURSE ANESTHETIST, CERTIFIED REGISTERED

## 2021-12-13 PROCEDURE — 25010000002 HYDROMORPHONE 1 MG/ML SOLUTION: Performed by: NURSE ANESTHETIST, CERTIFIED REGISTERED

## 2021-12-13 PROCEDURE — 25010000002 ONDANSETRON PER 1 MG: Performed by: NURSE ANESTHETIST, CERTIFIED REGISTERED

## 2021-12-13 PROCEDURE — C1889 IMPLANT/INSERT DEVICE, NOC: HCPCS | Performed by: PODIATRIST

## 2021-12-13 PROCEDURE — 87635 SARS-COV-2 COVID-19 AMP PRB: CPT | Performed by: PODIATRIST

## 2021-12-13 DEVICE — WAX,BONE,NATURAL
Type: IMPLANTABLE DEVICE | Site: FOOT | Status: FUNCTIONAL
Brand: MEDLINE INDUSTRIES

## 2021-12-13 DEVICE — COMPRESSION SCREW, CANNULATED
Type: IMPLANTABLE DEVICE | Site: FOOT | Status: FUNCTIONAL
Brand: AUTOFIX

## 2021-12-13 RX ORDER — PROPOFOL 10 MG/ML
INJECTION, EMULSION INTRAVENOUS AS NEEDED
Status: DISCONTINUED | OUTPATIENT
Start: 2021-12-13 | End: 2021-12-14 | Stop reason: SURG

## 2021-12-13 RX ORDER — HALOPERIDOL 5 MG/ML
0.5 INJECTION INTRAMUSCULAR ONCE
Status: DISCONTINUED | OUTPATIENT
Start: 2021-12-13 | End: 2021-12-13 | Stop reason: HOSPADM

## 2021-12-13 RX ORDER — CEFAZOLIN SODIUM 1 G/50ML
1 SOLUTION INTRAVENOUS ONCE
Status: COMPLETED | OUTPATIENT
Start: 2021-12-13 | End: 2021-12-13

## 2021-12-13 RX ORDER — LORAZEPAM 2 MG/ML
1 INJECTION INTRAMUSCULAR ONCE
Status: DISCONTINUED | OUTPATIENT
Start: 2021-12-13 | End: 2021-12-13 | Stop reason: HOSPADM

## 2021-12-13 RX ORDER — MIDAZOLAM HYDROCHLORIDE 2 MG/2ML
INJECTION, SOLUTION INTRAMUSCULAR; INTRAVENOUS AS NEEDED
Status: DISCONTINUED | OUTPATIENT
Start: 2021-12-13 | End: 2021-12-14 | Stop reason: SURG

## 2021-12-13 RX ORDER — ONDANSETRON 2 MG/ML
INJECTION INTRAMUSCULAR; INTRAVENOUS AS NEEDED
Status: DISCONTINUED | OUTPATIENT
Start: 2021-12-13 | End: 2021-12-14 | Stop reason: SURG

## 2021-12-13 RX ORDER — LIDOCAINE HYDROCHLORIDE 20 MG/ML
INJECTION, SOLUTION INTRAVENOUS AS NEEDED
Status: DISCONTINUED | OUTPATIENT
Start: 2021-12-13 | End: 2021-12-14 | Stop reason: SURG

## 2021-12-13 RX ORDER — SODIUM CHLORIDE, SODIUM LACTATE, POTASSIUM CHLORIDE, CALCIUM CHLORIDE 600; 310; 30; 20 MG/100ML; MG/100ML; MG/100ML; MG/100ML
INJECTION, SOLUTION INTRAVENOUS CONTINUOUS PRN
Status: DISCONTINUED | OUTPATIENT
Start: 2021-12-13 | End: 2021-12-14 | Stop reason: SURG

## 2021-12-13 RX ORDER — SODIUM CHLORIDE, SODIUM LACTATE, POTASSIUM CHLORIDE, CALCIUM CHLORIDE 600; 310; 30; 20 MG/100ML; MG/100ML; MG/100ML; MG/100ML
1000 INJECTION, SOLUTION INTRAVENOUS CONTINUOUS
Status: DISCONTINUED | OUTPATIENT
Start: 2021-12-13 | End: 2021-12-13 | Stop reason: HOSPADM

## 2021-12-13 RX ORDER — FENTANYL CITRATE 50 UG/ML
INJECTION, SOLUTION INTRAMUSCULAR; INTRAVENOUS AS NEEDED
Status: DISCONTINUED | OUTPATIENT
Start: 2021-12-13 | End: 2021-12-14 | Stop reason: SURG

## 2021-12-13 RX ORDER — IBUPROFEN 200 MG
TABLET ORAL AS NEEDED
Status: DISCONTINUED | OUTPATIENT
Start: 2021-12-13 | End: 2021-12-13 | Stop reason: HOSPADM

## 2021-12-13 RX ORDER — ONDANSETRON 2 MG/ML
4 INJECTION INTRAMUSCULAR; INTRAVENOUS ONCE
Status: DISCONTINUED | OUTPATIENT
Start: 2021-12-13 | End: 2021-12-13 | Stop reason: HOSPADM

## 2021-12-13 RX ORDER — KETAMINE HCL IN NACL, ISO-OSM 100MG/10ML
SYRINGE (ML) INJECTION AS NEEDED
Status: DISCONTINUED | OUTPATIENT
Start: 2021-12-13 | End: 2021-12-14 | Stop reason: SURG

## 2021-12-13 RX ORDER — SCOLOPAMINE TRANSDERMAL SYSTEM 1 MG/1
1 PATCH, EXTENDED RELEASE TRANSDERMAL ONCE
Status: DISCONTINUED | OUTPATIENT
Start: 2021-12-13 | End: 2021-12-13 | Stop reason: HOSPADM

## 2021-12-13 RX ORDER — DEXAMETHASONE SODIUM PHOSPHATE 4 MG/ML
INJECTION, SOLUTION INTRA-ARTICULAR; INTRALESIONAL; INTRAMUSCULAR; INTRAVENOUS; SOFT TISSUE AS NEEDED
Status: DISCONTINUED | OUTPATIENT
Start: 2021-12-13 | End: 2021-12-14 | Stop reason: SURG

## 2021-12-13 RX ADMIN — SCOPALAMINE 1 PATCH: 1 PATCH, EXTENDED RELEASE TRANSDERMAL at 11:42

## 2021-12-13 RX ADMIN — CEFAZOLIN SODIUM 1 G: 1 SOLUTION INTRAVENOUS at 13:09

## 2021-12-13 RX ADMIN — HYDROMORPHONE HYDROCHLORIDE 0.5 MG: 1 INJECTION, SOLUTION INTRAMUSCULAR; INTRAVENOUS; SUBCUTANEOUS at 14:14

## 2021-12-13 RX ADMIN — PROPOFOL 150 MG: 10 INJECTION, EMULSION INTRAVENOUS at 13:15

## 2021-12-13 RX ADMIN — LIDOCAINE HYDROCHLORIDE 40 MG: 20 INJECTION, SOLUTION INTRAVENOUS at 13:14

## 2021-12-13 RX ADMIN — Medication 25 MG: at 13:15

## 2021-12-13 RX ADMIN — PROPOFOL 50 MG: 10 INJECTION, EMULSION INTRAVENOUS at 13:16

## 2021-12-13 RX ADMIN — FENTANYL CITRATE 50 MCG: 50 INJECTION INTRAMUSCULAR; INTRAVENOUS at 13:10

## 2021-12-13 RX ADMIN — FENTANYL CITRATE 50 MCG: 50 INJECTION INTRAMUSCULAR; INTRAVENOUS at 13:15

## 2021-12-13 RX ADMIN — DEXAMETHASONE SODIUM PHOSPHATE 8 MG: 4 INJECTION, SOLUTION INTRAMUSCULAR; INTRAVENOUS at 13:25

## 2021-12-13 RX ADMIN — ONDANSETRON 4 MG: 2 INJECTION INTRAMUSCULAR; INTRAVENOUS at 13:25

## 2021-12-13 RX ADMIN — SODIUM CHLORIDE, POTASSIUM CHLORIDE, SODIUM LACTATE AND CALCIUM CHLORIDE 1000 ML: 600; 310; 30; 20 INJECTION, SOLUTION INTRAVENOUS at 11:42

## 2021-12-13 RX ADMIN — SODIUM CHLORIDE, POTASSIUM CHLORIDE, SODIUM LACTATE AND CALCIUM CHLORIDE: 600; 310; 30; 20 INJECTION, SOLUTION INTRAVENOUS at 13:08

## 2021-12-13 RX ADMIN — MIDAZOLAM HYDROCHLORIDE 2 MG: 1 INJECTION, SOLUTION INTRAMUSCULAR; INTRAVENOUS at 13:10

## 2021-12-13 NOTE — ANESTHESIA PROCEDURE NOTES
Airway  Urgency: elective    Date/Time: 12/13/2021 1:16 PM    General Information and Staff    Patient location during procedure: OR  CRNA: Jamaal Rizvi CRNA    Indications and Patient Condition  Indications: management.    Preoxygenated: yes  Mask difficulty assessment: 1 - vent by mask    Final Airway Details  Final airway type: supraglottic airway      Successful airway: classic  Size 4    Number of attempts at approach: 1  Assessment: lips, teeth, and gum same as pre-op and atraumatic intubation    Additional Comments  Lma placed by standard fashion, cuff up with mov, bbs and expansion =, + etco, tolerated without adverse rx. Syringe to  balloon for cuff pressure equalizationl

## 2021-12-13 NOTE — OP NOTE
Prateek Martinez DPM    OPERATIVE REPORT         PATIENT NAME: Ernestina Medina                              YOB: 1965      DATE OF SURGERY: 12/13/2021     PREOP DIAGNOSIS: Hallux limitus right with osteoarthritis right first MPJ     POSTOP DIAGNOSIS: Same    PROCEDURE:  FIRST METATARSAL OSTEOTOMY  BUNIONECTOMY RIGHT FOOT WITH POSSIBLE ARTHROSURFACE JOINT REPLACEMENT RIGHT FOOT (Right)     SURGEON: Prateek Martinez DPM    ANESTHESIA: General 20 cc half percent Marcaine plain injected postoperatively    OR STAFF: Circulator: Gino Lamar RN  Scrub Person: Arnoldo Gutierrez; Yenni Woodson       OPERATION DESCRIPTION: Brought the operating supine position.  Preprepped with alcohol and the foot was prepped scrubbed and draped aseptically.  4 to 5 cm incision was made over the first metatarsal phalangeal joint.  Sharp blunt dissection made down to level the capsule was reflected off the first metatarsal head and the base proximal phalanx.  It should be noted there is large dorsal osteophytes at the base proximal phalanx and the distal aspect of the first metatarsal.  Osteophytes were removed with rongeur and sharp blunt dissection.  The capsule reflected off the first metatarsal head revealing the articular cartilage of the first metatarsal head which was predominantly intact plantarly but noted some defects dorsal aspect of the metatarsal head.  It was decided intraoperatively to decompress the joint instead of do a joint replacement.  K wire was driven medial lateral as an axis guide across the first metatarsal head.  An osteotomy was performed plantarly and 60 degrees dorsally 90 degrees.  A 2 to 3 mm wedge of bone was removed from the distal dorsal aspect of the metatarsal to decompress and plantarflex the joint.  Fixation was done with a 3.0 16mm cannulated screw.  The remaining metatarsal was remodeled with combination sharp and blunt dissection flushed with saline and exposed  joint spaces were covered with bone wax.  Capsulorrhaphy performed medially to removed redundant tissue 15 blade pickups.  Medial capsular closure with 2-0 Ethibond, capsular and subcutaneous closure with 3-0 Vicryl.  4-0 nylon horizontal mattress type sutures for skin closure.  20 cc half percent Marcaine plain injected postoperatively.  Postop dressings included triple antibiotic ointment, cut up Adaptic, 4 x 4's, 4 inch Laura and 4 inch Ace wrap.  Tourniquet dropped proximally 30 minutes neurovascular status intact immediate to all digits of the right lower extremity.  Patient be discharged weightbearing surgical shoe follow-up my office week for dressing changes.    Estimated Blood Loss:  Minimal    TOURNIQUET TIME: Pneumatic ankle tourniquet 250 mmHg 30 minutes.      SPECIMENS:  None    DRAINS: None    COMPLICATIONS:  None    DISPOSITION:  Stable to recovery    Prateek Martinez DPM  12/13/2021  14:15 EST

## 2021-12-13 NOTE — DISCHARGE INSTRUCTIONS
1.) DO NOT CHANGE YOUR DRESSING!  2.) DO NOT GET YOUR DRESSING WET!  3.) Elevate your foot on two pillows.  Try to elevate at your heart level to prevent throbbing.  4.) Apply ice to the top of your foot 15 minutes per hour.  Do NOT apply ice to toes as this can cause frostbite.  5.) Weight bearing is allowed in post op shoe/walking boot.  6.) Crutches to be used.  7.) Follow up in [place] on scheduled date. 12/15/21 @2:30  8.)Expect pain, numbness, bruising and swelling after your surgery. Patient has prescriptions    **CALL Dr. Martinez in case of emergency or if the following occur:  -Fever, chills, nausea or vomiting  -Blood soaked or wet dressing  -Severe calf or leg pain  -Pain not controlled by medications  -Cold, purple, blue or black toes  -Trouble breathing, itching, rash or hives.    *I can be reached at [phone] during the day and [phone] during off hours.  If you cannot reach me and are having an emergency please go to the nearest emergency room.      Please follow all post op instructions and follow up appointment time from your physician's office included in your discharge packet.    Rest today    Keep the affected extremity elevated above  level of the heart.  Use your ice pack as instructed, do not use continuously.  Use your crutches and or post op shoe as directed    Follow your physicians instructions as previously directed    .No pushing, pulling, tugging,  heavy lifting, or strenuous activity.  No major decision making, driving, or drinking alcoholic beverages for 24 hours. ( due to the medications you have  received)  Always use good hand hygiene/washing techniques.  NO driving while taking pain medications.    * if you have an incision:  Check your incision area every day for signs of infection.   Check for:  * more redness, swelling, or pain  *more fluid or blood  *warmth  *pus or bad smell    To assist you in voiding:  Drink plenty of fluids  Listen to running water while attempting  to void.    If you are unable to urinate and you have an uncomfortable urge to void or it has been   6 hours since you were discharged, return to the Emergency Room

## 2021-12-13 NOTE — ANESTHESIA POSTPROCEDURE EVALUATION
Patient: Ernestina Medina    Procedure Summary     Date: 12/13/21 Room / Location: [facility] OR  /  [facility] OR    Anesthesia Start: 1309 Anesthesia Stop:     Procedure: FIRST METATARSAL OSTEOTOMY  BUNIONECTOMY RIGHT FOOT WITH POSSIBLE ARTHROSURFACE JOINT REPLACEMENT RIGHT FOOT (Right Foot) Diagnosis:     Surgeons: Prateek Martinez DPM Provider: Jamaal Rizvi CRNA    Anesthesia Type: general ASA Status: 2          Anesthesia Type: general    Vitals  No vitals data found for the desired time range.          Post Anesthesia Care and Evaluation    Patient location during evaluation: PACU  Patient participation: complete - patient participated  Level of consciousness: awake  Pain score: 0  Pain management: adequate  Airway patency: patent  Anesthetic complications: No anesthetic complications  PONV Status: none  Cardiovascular status: acceptable  Respiratory status: acceptable and face mask  Hydration status: acceptable    Comments: vsss resp spont, reflexes intact, responsive, report given to pacu nurse

## 2021-12-13 NOTE — OP NOTE
Prateek Martinez DPM    OPERATIVE REPORT         PATIENT NAME: Ernestina Medina                              YOB: 1965      DATE OF SURGERY: 12/13/2021     PREOP DIAGNOSIS:     POSTOP DIAGNOSIS:     PROCEDURE:  FIRST METATARSAL OSTEOTOMY  BUNIONECTOMY RIGHT FOOT WITH POSSIBLE ARTHROSURFACE JOINT REPLACEMENT RIGHT FOOT (Right)     SURGEON: Prateek Martinez DPM    ANESTHESIA:      OR STAFF: Circulator: Gino Lamar RN  Scrub Person: Arnoldo Gutierrez; Yenni Woodson       OPERATION DESCRIPTION:     Estimated Blood Loss:  Minimal    TOURNIQUET TIME:       SPECIMENS:  None    DRAINS: None    COMPLICATIONS:  None    DISPOSITION:  Stable to recovery    Prateek Martinez DPM  12/13/2021  14:03 EST

## 2021-12-13 NOTE — ANESTHESIA PREPROCEDURE EVALUATION
Anesthesia Evaluation     Patient summary reviewed and Nursing notes reviewed   history of anesthetic complications: PONV  NPO Solid Status: > 8 hours  NPO Liquid Status: > 8 hours           Airway   Mallampati: I  TM distance: >3 FB  Neck ROM: full  no difficulty expected  Dental - normal exam     Pulmonary - normal exam    breath sounds clear to auscultation  (+) asthma,  (-) not a smoker    ROS comment: No problems asthma for 5 years  Cardiovascular - normal exam    ECG reviewed  Rhythm: regular  Rate: normal    (+) hyperlipidemia,     ROS comment: EKG SB 52, NSR    Neuro/Psych  (+) headaches, psychiatric history Anxiety and Depression,       ROS Comment: Migraines  GI/Hepatic/Renal/Endo - negative ROS     ROS Comment: N/V reason for EGD    Musculoskeletal     (+) arthralgias, back pain, chronic pain, myalgias,   Abdominal    Substance History   (+) alcohol use,       Comment: Social etoh   OB/GYN negative ob/gyn ROS         Other   blood dyscrasia anemia,                       Anesthesia Plan    ASA 2     general   (Pt told that intravenous sedation will be used as the primary anesthetic. Every effort will be made to make sure the patient is comfortable.     The patient was told they may or may not have recall for the procedure.Will proceed with the plan of care.)  intravenous induction     Anesthetic plan, all risks, benefits, and alternatives have been provided, discussed and informed consent has been obtained with: patient.

## 2022-01-11 ENCOUNTER — OFFICE VISIT (OUTPATIENT)
Dept: INTERNAL MEDICINE | Facility: CLINIC | Age: 57
End: 2022-01-11

## 2022-01-11 VITALS
OXYGEN SATURATION: 98 % | SYSTOLIC BLOOD PRESSURE: 156 MMHG | WEIGHT: 138 LBS | HEART RATE: 60 BPM | HEIGHT: 64 IN | TEMPERATURE: 97.5 F | DIASTOLIC BLOOD PRESSURE: 92 MMHG | BODY MASS INDEX: 23.56 KG/M2 | RESPIRATION RATE: 14 BRPM

## 2022-01-11 DIAGNOSIS — R03.0 ELEVATED BLOOD PRESSURE READING WITHOUT DIAGNOSIS OF HYPERTENSION: Primary | ICD-10-CM

## 2022-01-11 DIAGNOSIS — R21 RASH AND NONSPECIFIC SKIN ERUPTION: ICD-10-CM

## 2022-01-11 DIAGNOSIS — F33.0 MILD EPISODE OF RECURRENT MAJOR DEPRESSIVE DISORDER: ICD-10-CM

## 2022-01-11 DIAGNOSIS — F41.9 ANXIETY: ICD-10-CM

## 2022-01-11 DIAGNOSIS — D51.0 PERNICIOUS ANEMIA: ICD-10-CM

## 2022-01-11 PROCEDURE — 99214 OFFICE O/P EST MOD 30 MIN: CPT | Performed by: INTERNAL MEDICINE

## 2022-01-11 PROCEDURE — 96372 THER/PROPH/DIAG INJ SC/IM: CPT | Performed by: INTERNAL MEDICINE

## 2022-01-11 RX ORDER — ESCITALOPRAM OXALATE 10 MG/1
10 TABLET ORAL DAILY
Qty: 30 TABLET | Refills: 5 | Status: SHIPPED | OUTPATIENT
Start: 2022-01-11 | End: 2022-05-11 | Stop reason: SDUPTHER

## 2022-01-11 RX ADMIN — CYANOCOBALAMIN 1000 MCG: 1000 INJECTION, SOLUTION INTRAMUSCULAR; SUBCUTANEOUS at 10:38

## 2022-01-11 NOTE — PROGRESS NOTES
"Chief Complaint  Hypertension (spots on back of ear) and Anxiety    Subjective          Ernestina Medina presents to [facility]  History of Present Illness  Patient is here to follow-up on blood pressure which is noted to be elevated, she does state her home blood pressure readings are within range and she is advised to monitor her vitals at home, she is also complaining of anxiety and depression and is stressed out, she is aking the Lexapro, she could not tolerate BuSpar or Elavil, she was seen by cardiology, she complains of rash behind the left ear, she needs a B12 shot today for pernicious anemia    Objective   Vital Signs:   /92   Pulse 60   Temp 97.5 °F (36.4 °C)   Resp 14   Ht 162.6 cm (64.02\")   Wt 62.6 kg (138 lb)   SpO2 98%   BMI 23.68 kg/m²     Physical Exam  Vitals and nursing note reviewed.   Constitutional:       General: She is not in acute distress.     Appearance: Normal appearance. She is not diaphoretic.   HENT:      Head: Normocephalic and atraumatic.      Right Ear: External ear normal.      Left Ear: External ear normal.      Nose: Nose normal.   Eyes:      Extraocular Movements: Extraocular movements intact.      Conjunctiva/sclera: Conjunctivae normal.   Neck:      Trachea: Trachea normal.   Cardiovascular:      Rate and Rhythm: Normal rate and regular rhythm.      Heart sounds: Normal heart sounds.   Pulmonary:      Effort: Pulmonary effort is normal. No respiratory distress.      Breath sounds: Normal breath sounds.   Abdominal:      General: Abdomen is flat.   Musculoskeletal:      Cervical back: Neck supple.      Comments: Moves all limbs   Skin:     General: Skin is warm.      Comments:  seborrheic keratosis behind left ear  - 3 lesions   Neurological:      Mental Status: She is alert and oriented to person, place, and time.      Comments: No gross motor or sensory deficits        Result Review :     Common labs    Common Labsle 7/1/21 " 7/1/21 7/1/21 12/9/21 12/9/21    1448 1448 1448 1359 1400   Glucose  91   100 (A)   BUN  16   14   Creatinine  0.71   0.58   eGFR Non  Am  86   108   eGFR African Am  104      Sodium  139   140   Potassium  4.0   4.3   Chloride  103   104   Calcium  9.9   9.7   Total Protein  7.6      Albumin  5.30 (A)      Total Bilirubin  0.7      Alkaline Phosphatase  73      AST (SGOT)  18      ALT (SGPT)  13      WBC 4.23   4.79    Hemoglobin 13.3   12.2    Hematocrit 40.3   38.0    Platelets 260   255    Total Cholesterol   231 (A)     Triglycerides   51     HDL Cholesterol   120 (A)     LDL Cholesterol    102 (A)     (A) Abnormal value       Comments are available for some flowsheets but are not being displayed.                     Assessment and Plan    Diagnoses and all orders for this visit:    1. Elevated blood pressure reading without diagnosis of hypertension (Primary)    2. Pernicious anemia    3. Anxiety  -     escitalopram (Lexapro) 10 MG tablet; Take 1 tablet by mouth Daily.  Dispense: 30 tablet; Refill: 5    4. Mild episode of recurrent major depressive disorder (HCC)  -     escitalopram (Lexapro) 10 MG tablet; Take 1 tablet by mouth Daily.  Dispense: 30 tablet; Refill: 5    5. Rash and nonspecific skin eruption  -     Ambulatory Referral to Dermatology    Plan:  1.elevated blood pressure without a diagnosis of hypertension: Patient advised to monitor vitals, follow low-sodium diet  2. anxiety disorder: We will continue Lexapro 10 mg daily, labs reviewed  3. major depression: Continue Lexapro 10 mg daily  4. rash: Refer patient to dermatology  5. pernicious anemia: B12 IM given in office today  I spent 30 minutes caring for Ernestina on this date of service. This time includes time spent by me in the following activities:preparing for the visit, reviewing tests, performing a medically appropriate examination and/or evaluation , counseling and educating the patient/family/caregiver, ordering medications, tests,  or procedures and documenting information in the medical record  Follow Up   Return in about 4 months (around 5/11/2022).  Patient was given instructions and counseling regarding her condition or for health maintenance advice. Please see specific information pulled into the AVS if appropriate.

## 2022-01-18 ENCOUNTER — OFFICE VISIT (OUTPATIENT)
Dept: INTERNAL MEDICINE | Facility: CLINIC | Age: 57
End: 2022-01-18

## 2022-01-18 VITALS
SYSTOLIC BLOOD PRESSURE: 138 MMHG | WEIGHT: 134 LBS | HEIGHT: 64 IN | RESPIRATION RATE: 18 BRPM | TEMPERATURE: 97.5 F | HEART RATE: 82 BPM | BODY MASS INDEX: 22.88 KG/M2 | DIASTOLIC BLOOD PRESSURE: 82 MMHG

## 2022-01-18 DIAGNOSIS — Z98.82 PRESENCE OF SILICONE BREAST IMPLANT: ICD-10-CM

## 2022-01-18 DIAGNOSIS — Z01.818 PREOPERATIVE EXAMINATION: Primary | ICD-10-CM

## 2022-01-18 PROCEDURE — 99214 OFFICE O/P EST MOD 30 MIN: CPT | Performed by: INTERNAL MEDICINE

## 2022-01-18 PROCEDURE — 93000 ELECTROCARDIOGRAM COMPLETE: CPT | Performed by: INTERNAL MEDICINE

## 2022-01-18 NOTE — PROGRESS NOTES
Subjective   Ernestina Medina is a 56 y.o. female.     Chief Complaint   Patient presents with   • Pre-op Exam      plastic surgeon for bilateral breast surgery- removal of gel implants bilateral - on 1/28/22       History of Present Illness   Patient is here for a preoperative examination for removal of bilateral gel implants, she is undergoing bilateral breast surgery by  on January 28, 2022    Review of Systems   Constitutional: Negative for appetite change, fatigue and fever.   HENT: Negative for congestion, ear discharge, ear pain, sinus pressure and sore throat.    Eyes: Negative for pain and discharge.   Respiratory: Negative for cough, chest tightness, shortness of breath and wheezing.    Cardiovascular: Negative for chest pain, palpitations and leg swelling.   Gastrointestinal: Negative for abdominal pain, blood in stool, constipation, diarrhea and nausea.   Endocrine: Negative for cold intolerance and heat intolerance.   Genitourinary: Negative for dysuria, flank pain and frequency.   Musculoskeletal: Negative for back pain and joint swelling.   Skin: Negative for color change.   Allergic/Immunologic: Negative for environmental allergies and food allergies.   Neurological: Negative for dizziness, weakness, numbness and headaches.   Hematological: Negative for adenopathy. Does not bruise/bleed easily.   Psychiatric/Behavioral: Negative for behavioral problems and dysphoric mood. The patient is not nervous/anxious.        Past Medical History:   Diagnosis Date   • Abdominal pain    • Anxiety    • Asthma    • Elbow pain    • Endometriosis    • Fatigue    • H/O seasonal allergies    • Hand fracture, right    • Herpes labialis    • Joint pain of ankle and foot    • Leukopenia    • Migraine    • Ovarian cyst    • Pain in left knee    • Pain in limb    • Paresthesia    • Pernicious anemia    • Plantar fasciitis    • Polycystic ovarian syndrome    • PONV (postoperative nausea and  "vomiting)    • Recurrent aphthous stomatitis    • Tattoo    • Vitamin B12 deficiency    • Wrist fracture, right        Past Surgical History:   Procedure Laterality Date   • BREAST SURGERY  2009   • BUNIONECTOMY Right 12/13/2021    Procedure: FIRST METATARSAL OSTEOTOMY  BUNIONECTOMY RIGHT FOOT WITH POSSIBLE ARTHROSURFACE JOINT REPLACEMENT RIGHT FOOT;  Surgeon: Prateek Martinez DPM;  Location: [facility] OR;  Service: Podiatry;  Laterality: Right;   • CHOLECYSTECTOMY  1990   • ENDOSCOPY N/A 6/2/2017    Procedure: ESOPHAGOGASTRODUODENOSCOPY WITH BIOPSIES AND COLD BIOPSY POLYPECTOMY;  Surgeon: Yovany Pacheco MD;  Location: [facility] ENDOSCOPY;  Service:    • FOOT SURGERY Right 2015   • HYSTERECTOMY     • OTHER SURGICAL HISTORY      CYST FROM LEFT OVARY   • SKIN BIOPSY     • UPPER GASTROINTESTINAL ENDOSCOPY  06/02/2017   • WISDOM TOOTH EXTRACTION         Family History   Problem Relation Age of Onset   • Arthritis Mother    • Hypertension Mother    • Migraines Mother    • Other Father         arteriosclerotic cardiovascular disease   • Stroke Father    • Stroke Brother    • Hypertension Other         sibling   • Colon cancer Neg Hx         reports that she has never smoked. She has never used smokeless tobacco. She reports current alcohol use of about 4.0 standard drinks of alcohol per week. She reports that she does not use drugs.    Allergies   Allergen Reactions   • Compazine [Prochlorperazine] Other (See Comments)     \"IT MAKES MY JAW LOCK-UP\"   • Buspar [Buspirone] Nausea And Vomiting           Current Outpatient Medications:   •  escitalopram (Lexapro) 10 MG tablet, Take 1 tablet by mouth Daily., Disp: 30 tablet, Rfl: 5    Current Facility-Administered Medications:   •  cyanocobalamin injection 1,000 mcg, 1,000 mcg, Intramuscular, Q28 Days, Uma Anne MD, 1,000 mcg at 01/11/22 1038      Objective   Blood pressure 138/82, pulse 82, temperature 97.5 °F (36.4 °C), temperature source Infrared, resp. rate 18, height " "162.6 cm (64\"), weight 60.8 kg (134 lb).    Physical Exam  Vitals and nursing note reviewed.   Constitutional:       General: She is not in acute distress.     Appearance: Normal appearance. She is not diaphoretic.   HENT:      Head: Normocephalic and atraumatic.      Right Ear: External ear normal.      Left Ear: External ear normal.      Nose: Nose normal.   Eyes:      Extraocular Movements: Extraocular movements intact.      Conjunctiva/sclera: Conjunctivae normal.   Neck:      Trachea: Trachea normal.   Cardiovascular:      Rate and Rhythm: Normal rate and regular rhythm.      Heart sounds: Normal heart sounds.   Pulmonary:      Effort: Pulmonary effort is normal. No respiratory distress.      Breath sounds: Normal breath sounds.   Abdominal:      General: Abdomen is flat.   Musculoskeletal:      Cervical back: Neck supple.      Comments: Moves all limbs   Skin:     General: Skin is warm.   Neurological:      Mental Status: She is alert and oriented to person, place, and time.      Comments: No gross motor or sensory deficits         Patient's Body mass index is 23 kg/m². indicating that she is within normal range (BMI 18.5-24.9). No BMI management plan needed..    Lab Results   Component Value Date    GLUCOSE 100 (H) 12/09/2021    CALCIUM 9.7 12/09/2021     12/09/2021    K 4.3 12/09/2021    CO2 26.0 12/09/2021     12/09/2021    BUN 14 12/09/2021    CREATININE 0.58 12/09/2021    EGFRIFAFRI 104 07/01/2021    EGFRIFNONA 108 12/09/2021    BCR 24.1 12/09/2021    ANIONGAP 10.0 12/09/2021         Lab Results   Component Value Date    WBC 4.79 12/09/2021    HGB 12.2 12/09/2021    HCT 38.0 12/09/2021    MCV 97.7 (H) 12/09/2021     12/09/2021         Results for orders placed or performed during the hospital encounter of 12/13/21   COVID-19,[facility] IN-HOUSE,Nasal Swab No Transport Media 3-4 HR TAT - Swab, Nasal Cavity    Specimen: Nasal Cavity; Swab   Result Value Ref Range    COVID19 Not Detected Not " Detected - Ref. Range       ECG 12 Lead    Date/Time: 1/18/2022 9:50 AM  Performed by: Uma Anne MD  Authorized by: Uma Anne MD   Comparison: compared with previous ECG from 9/8/2021  Rhythm: sinus bradycardia  Rate: bradycardic  BPM: 59  Conduction: conduction normal  ST Segments: ST segments normal  T Waves: T waves normal  QRS axis: normal  Other: no other findings    Clinical impression: normal ECG              Assessment/Plan   Diagnoses and all orders for this visit:    1. Preoperative examination (Primary)  -     ECG 12 Lead    2. Presence of silicone breast implant    Plan:  1.preoperative examination: Lab work and EKG have been reviewed today, patient is in optimal medical condition and she can proceed for bilateral breast surgery  2.  Presence of silicone breast implant : Patient can proceed for gel implant removal and is cleared for IV/general sedation for her planned procedure           Uma Anne MD

## 2022-05-11 ENCOUNTER — OFFICE VISIT (OUTPATIENT)
Dept: INTERNAL MEDICINE | Facility: CLINIC | Age: 57
End: 2022-05-11

## 2022-05-11 VITALS
DIASTOLIC BLOOD PRESSURE: 90 MMHG | WEIGHT: 138 LBS | HEART RATE: 65 BPM | RESPIRATION RATE: 16 BRPM | SYSTOLIC BLOOD PRESSURE: 160 MMHG | HEIGHT: 64 IN | TEMPERATURE: 97.3 F | OXYGEN SATURATION: 98 % | BODY MASS INDEX: 23.56 KG/M2

## 2022-05-11 DIAGNOSIS — F33.0 MILD EPISODE OF RECURRENT MAJOR DEPRESSIVE DISORDER: ICD-10-CM

## 2022-05-11 DIAGNOSIS — F41.9 ANXIETY: ICD-10-CM

## 2022-05-11 DIAGNOSIS — F51.04 INSOMNIA, PSYCHOPHYSIOLOGICAL: ICD-10-CM

## 2022-05-11 DIAGNOSIS — I10 BENIGN ESSENTIAL HYPERTENSION: Primary | ICD-10-CM

## 2022-05-11 PROCEDURE — 99214 OFFICE O/P EST MOD 30 MIN: CPT | Performed by: INTERNAL MEDICINE

## 2022-05-11 RX ORDER — ESCITALOPRAM OXALATE 20 MG/1
20 TABLET ORAL DAILY
Qty: 90 TABLET | Refills: 1 | Status: SHIPPED | OUTPATIENT
Start: 2022-05-11 | End: 2022-09-01 | Stop reason: SDUPTHER

## 2022-05-11 RX ORDER — MELOXICAM 15 MG/1
1 TABLET ORAL DAILY
COMMUNITY
Start: 2022-02-22 | End: 2022-05-11

## 2022-05-11 RX ORDER — TEMAZEPAM 15 MG/1
15 CAPSULE ORAL NIGHTLY PRN
Qty: 20 CAPSULE | Refills: 0 | Status: SHIPPED | OUTPATIENT
Start: 2022-05-11 | End: 2022-05-31

## 2022-05-11 NOTE — PROGRESS NOTES
"Chief Complaint  Hypertension, Anxiety, and Insomnia    Subjective          Ernestina Medina presents to [facility]IMARY [facility]  History of Present Illness  Patient is here to follow-up on her blood pressure which is noted to be elevated, she states she was seen by rheumatology recently and had blood work done and was put on meloxicam which has increased her blood pressure, she is also to follow-up on anxiety and also is on Lexapro which has helped her symptoms but she continues to be anxious, she also complains of insomnia and has tried Elavil, trazodone, and Seroquel    Objective   Vital Signs:  /90   Pulse 65   Temp 97.3 °F (36.3 °C)   Resp 16   Ht 162.6 cm (64.02\")   Wt 62.6 kg (138 lb)   SpO2 98%   BMI 23.68 kg/m²     BMI is within normal parameters. No follow-up required.      Physical Exam  Vitals and nursing note reviewed.   Constitutional:       General: She is not in acute distress.     Appearance: Normal appearance. She is not diaphoretic.   HENT:      Head: Normocephalic and atraumatic.      Right Ear: External ear normal.      Left Ear: External ear normal.      Nose: Nose normal.   Eyes:      Extraocular Movements: Extraocular movements intact.      Conjunctiva/sclera: Conjunctivae normal.   Neck:      Trachea: Trachea normal.   Cardiovascular:      Rate and Rhythm: Normal rate and regular rhythm.      Heart sounds: Normal heart sounds.   Pulmonary:      Effort: Pulmonary effort is normal. No respiratory distress.      Breath sounds: Normal breath sounds.   Abdominal:      General: Abdomen is flat.   Musculoskeletal:      Cervical back: Neck supple.      Comments: Moves all limbs   Skin:     General: Skin is warm.   Neurological:      Mental Status: She is alert and oriented to person, place, and time.      Comments: No gross motor or sensory deficits        Result Review :     CMP    CMP 7/1/21 12/9/21   Glucose 91 100 (A)   BUN 16 14   Creatinine 0.71 0.58 "   eGFR Non  Am 86 108   eGFR African Am 104    Sodium 139 140   Potassium 4.0 4.3   Chloride 103 104   Calcium 9.9 9.7   Total Protein 7.6    Albumin 5.30 (A)    Globulin 2.3    Total Bilirubin 0.7    Alkaline Phosphatase 73    AST (SGOT) 18    ALT (SGPT) 13    (A) Abnormal value       Comments are available for some flowsheets but are not being displayed.           CBC    CBC 7/1/21 12/9/21   WBC 4.23 4.79   RBC 4.33 3.89   Hemoglobin 13.3 12.2   Hematocrit 40.3 38.0   MCV 93.1 97.7 (A)   MCH 30.7 31.4   MCHC 33.0 32.1   RDW 11.9 (A) 12.6   Platelets 260 255   (A) Abnormal value                      Assessment and Plan    Diagnoses and all orders for this visit:    1. Benign essential hypertension (Primary)    2. Anxiety  -     escitalopram (Lexapro) 20 MG tablet; Take 1 tablet by mouth Daily.  Dispense: 90 tablet; Refill: 1    3. Mild episode of recurrent major depressive disorder (HCC)  -     escitalopram (Lexapro) 20 MG tablet; Take 1 tablet by mouth Daily.  Dispense: 90 tablet; Refill: 1    4. Insomnia, psychophysiological  -     temazepam (RESTORIL) 15 MG capsule; Take 1 capsule by mouth At Night As Needed for Sleep.  Dispense: 20 capsule; Refill: 0    Plan:  1.  Benign essential hypertension:   low-sodium diet advised, Counseled to regularly check BP at home with goal averaging <130/80.  Stop all NSAIDs, labs reviewed  2.  Anxiety disorder: We will increase to Lexapro 20 mg daily  3.  Major depression: Increase Lexapro 20 mg daily  4.  Insomnia: We will give a trial with temazepam       I spent 30 minutes caring for Ernestina on this date of service. This time includes time spent by me in the following activities:preparing for the visit, reviewing tests, performing a medically appropriate examination and/or evaluation , counseling and educating the patient/family/caregiver, ordering medications, tests, or procedures and documenting information in the medical record  Follow Up   Return in about 2 weeks  (around 5/25/2022).  Patient was given instructions and counseling regarding her condition or for health maintenance advice. Please see specific information pulled into the AVS if appropriate.

## 2022-05-31 ENCOUNTER — OFFICE VISIT (OUTPATIENT)
Dept: INTERNAL MEDICINE | Facility: CLINIC | Age: 57
End: 2022-05-31

## 2022-05-31 VITALS
OXYGEN SATURATION: 98 % | BODY MASS INDEX: 23.7 KG/M2 | DIASTOLIC BLOOD PRESSURE: 84 MMHG | SYSTOLIC BLOOD PRESSURE: 132 MMHG | TEMPERATURE: 97.3 F | HEART RATE: 58 BPM | HEIGHT: 64 IN | WEIGHT: 138.8 LBS

## 2022-05-31 DIAGNOSIS — F33.0 MILD EPISODE OF RECURRENT MAJOR DEPRESSIVE DISORDER: ICD-10-CM

## 2022-05-31 DIAGNOSIS — F51.04 INSOMNIA, PSYCHOPHYSIOLOGICAL: ICD-10-CM

## 2022-05-31 DIAGNOSIS — F41.9 ANXIETY: ICD-10-CM

## 2022-05-31 DIAGNOSIS — E53.8 VITAMIN B12 DEFICIENCY: ICD-10-CM

## 2022-05-31 DIAGNOSIS — I10 BENIGN ESSENTIAL HYPERTENSION: Primary | ICD-10-CM

## 2022-05-31 PROCEDURE — 99214 OFFICE O/P EST MOD 30 MIN: CPT | Performed by: INTERNAL MEDICINE

## 2022-05-31 PROCEDURE — 96372 THER/PROPH/DIAG INJ SC/IM: CPT | Performed by: INTERNAL MEDICINE

## 2022-05-31 RX ORDER — CYANOCOBALAMIN 1000 UG/ML
1000 INJECTION, SOLUTION INTRAMUSCULAR; SUBCUTANEOUS
Status: SHIPPED | OUTPATIENT
Start: 2022-05-31 | End: 2022-11-15

## 2022-05-31 RX ORDER — DIAZEPAM 5 MG/1
5 TABLET ORAL NIGHTLY PRN
Qty: 20 TABLET | Refills: 0 | Status: SHIPPED | OUTPATIENT
Start: 2022-05-31 | End: 2022-07-11

## 2022-05-31 RX ADMIN — CYANOCOBALAMIN 1000 MCG: 1000 INJECTION, SOLUTION INTRAMUSCULAR; SUBCUTANEOUS at 12:15

## 2022-07-11 ENCOUNTER — OFFICE VISIT (OUTPATIENT)
Dept: INTERNAL MEDICINE | Facility: CLINIC | Age: 57
End: 2022-07-11

## 2022-07-11 VITALS
SYSTOLIC BLOOD PRESSURE: 148 MMHG | WEIGHT: 136 LBS | TEMPERATURE: 98 F | HEIGHT: 64 IN | RESPIRATION RATE: 16 BRPM | BODY MASS INDEX: 23.22 KG/M2 | OXYGEN SATURATION: 97 % | DIASTOLIC BLOOD PRESSURE: 87 MMHG | HEART RATE: 56 BPM

## 2022-07-11 DIAGNOSIS — D51.0 PERNICIOUS ANEMIA: ICD-10-CM

## 2022-07-11 DIAGNOSIS — R03.0 ELEVATED BLOOD PRESSURE READING WITHOUT DIAGNOSIS OF HYPERTENSION: ICD-10-CM

## 2022-07-11 DIAGNOSIS — G25.81 RESTLESS LEG SYNDROME: ICD-10-CM

## 2022-07-11 DIAGNOSIS — F41.9 ANXIETY: ICD-10-CM

## 2022-07-11 DIAGNOSIS — Z00.00 ROUTINE GENERAL MEDICAL EXAMINATION AT A HEALTH CARE FACILITY: Primary | ICD-10-CM

## 2022-07-11 PROCEDURE — 99396 PREV VISIT EST AGE 40-64: CPT | Performed by: INTERNAL MEDICINE

## 2022-07-11 PROCEDURE — 96372 THER/PROPH/DIAG INJ SC/IM: CPT | Performed by: INTERNAL MEDICINE

## 2022-07-11 RX ORDER — PRAMIPEXOLE DIHYDROCHLORIDE 1 MG/1
1 TABLET ORAL NIGHTLY
Qty: 30 TABLET | Refills: 3 | Status: SHIPPED | OUTPATIENT
Start: 2022-07-11 | End: 2022-07-28

## 2022-07-11 RX ORDER — ALPRAZOLAM 1 MG/1
1 TABLET ORAL NIGHTLY PRN
Qty: 20 TABLET | Refills: 0 | Status: SHIPPED | OUTPATIENT
Start: 2022-07-11 | End: 2022-09-01 | Stop reason: SDUPTHER

## 2022-07-11 RX ADMIN — CYANOCOBALAMIN 1000 MCG: 1000 INJECTION, SOLUTION INTRAMUSCULAR; SUBCUTANEOUS at 15:24

## 2022-07-11 NOTE — PROGRESS NOTES
Subjective   Ernestina Medina is a 57 y.o. female.     Chief Complaint   Patient presents with   • Annual Exam       History of Present Illness   Patient is here for physical exam, she is due for labs for cholesterol, her blood pressure is noted to be elevated but her home blood pressure readings are within range per patient, she also complains of anxiety and is taking the Lexapro, she has tried using Valium and temazepam to help with sleep and anxiety but they did not, patient also complains of restless legs, she needs a B12 shot today    Review of Systems   Neurological:        Restless legs   Psychiatric/Behavioral: Positive for sleep disturbance. The patient is nervous/anxious.        Past Medical History:   Diagnosis Date   • Abdominal pain    • Anxiety    • Asthma    • Elbow pain    • Endometriosis    • Fatigue    • H/O seasonal allergies    • Hand fracture, right    • Herpes labialis    • Joint pain of ankle and foot    • Leukopenia    • Migraine    • Ovarian cyst    • Pain in left knee    • Pain in limb    • Paresthesia    • Pernicious anemia    • Plantar fasciitis    • Polycystic ovarian syndrome    • PONV (postoperative nausea and vomiting)    • Recurrent aphthous stomatitis    • Tattoo    • Vitamin B12 deficiency    • Wrist fracture, right        Past Surgical History:   Procedure Laterality Date   • BREAST SURGERY  2009   • BUNIONECTOMY Right 12/13/2021    Procedure: FIRST METATARSAL OSTEOTOMY  BUNIONECTOMY RIGHT FOOT WITH POSSIBLE ARTHROSURFACE JOINT REPLACEMENT RIGHT FOOT;  Surgeon: Prateek Martinez DPM;  Location: [facility] OR;  Service: Podiatry;  Laterality: Right;   • CHOLECYSTECTOMY  1990   • ENDOSCOPY N/A 6/2/2017    Procedure: ESOPHAGOGASTRODUODENOSCOPY WITH BIOPSIES AND COLD BIOPSY POLYPECTOMY;  Surgeon: Yovany Pacheco MD;  Location: [facility] ENDOSCOPY;  Service:    • FOOT SURGERY Right 2015   • HYSTERECTOMY     • OTHER SURGICAL HISTORY      CYST FROM LEFT OVARY   • SKIN BIOPSY     •  "UPPER GASTROINTESTINAL ENDOSCOPY  06/02/2017   • WISDOM TOOTH EXTRACTION         Family History   Problem Relation Age of Onset   • Arthritis Mother    • Hypertension Mother    • Migraines Mother    • Other Father         arteriosclerotic cardiovascular disease   • Stroke Father    • Stroke Brother    • Hypertension Other         sibling   • Colon cancer Neg Hx         reports that she has never smoked. She has never used smokeless tobacco. She reports current alcohol use of about 4.0 standard drinks of alcohol per week. She reports that she does not use drugs.    Allergies   Allergen Reactions   • Compazine [Prochlorperazine] Other (See Comments)     \"IT MAKES MY JAW LOCK-UP\"   • Buspar [Buspirone] Nausea And Vomiting           Current Outpatient Medications:   •  escitalopram (Lexapro) 20 MG tablet, Take 1 tablet by mouth Daily., Disp: 90 tablet, Rfl: 1  •  ALPRAZolam (XANAX) 1 MG tablet, Take 1 tablet by mouth At Night As Needed for Sleep., Disp: 20 tablet, Rfl: 0  •  pramipexole (Mirapex) 1 MG tablet, Take 1 tablet by mouth Every Night., Disp: 30 tablet, Rfl: 3    Current Facility-Administered Medications:   •  cyanocobalamin injection 1,000 mcg, 1,000 mcg, Intramuscular, Q28 Days, Uma Anne MD, 1,000 mcg at 07/11/22 1524      Objective   Blood pressure 148/87, pulse 56, temperature 98 °F (36.7 °C), resp. rate 16, height 162.6 cm (64.02\"), weight 61.7 kg (136 lb), SpO2 97 %.  Vitals:    07/11/22 1353 07/11/22 1425   BP: 166/94 148/87   Pulse: 62 56   Resp: 16    Temp: 98 °F (36.7 °C)    SpO2: 97%    Weight: 61.7 kg (136 lb)    Height: 162.6 cm (64.02\")        Physical Exam  Vitals and nursing note reviewed.   Constitutional:       General: She is not in acute distress.     Appearance: Normal appearance. She is not diaphoretic.   HENT:      Head: Normocephalic and atraumatic.      Right Ear: External ear normal.      Left Ear: External ear normal.      Nose: Nose normal.   Eyes:      Extraocular Movements: " Extraocular movements intact.      Conjunctiva/sclera: Conjunctivae normal.   Neck:      Trachea: Trachea normal.   Cardiovascular:      Rate and Rhythm: Normal rate and regular rhythm.      Heart sounds: Normal heart sounds.   Pulmonary:      Effort: Pulmonary effort is normal. No respiratory distress.      Breath sounds: Normal breath sounds.   Abdominal:      General: Abdomen is flat.   Musculoskeletal:      Cervical back: Neck supple.      Comments: Moves all limbs   Skin:     General: Skin is warm.   Neurological:      Mental Status: She is alert and oriented to person, place, and time.      Comments: No gross motor or sensory deficits         BMI is within normal parameters. No other follow-up for BMI required.      Results for orders placed or performed during the hospital encounter of 12/13/21   COVID-19,[facility] IN-HOUSE,Nasal Swab No Transport Media 3-4 HR TAT - Swab, Nasal Cavity    Specimen: Nasal Cavity; Swab   Result Value Ref Range    COVID19 Not Detected Not Detected - Ref. Range         Assessment & Plan   Diagnoses and all orders for this visit:    1. Routine general medical examination at a health care facility (Primary)  -     CBC & Differential  -     Comprehensive Metabolic Panel  -     Lipid Panel  -     Hemoglobin A1c  -     TSH  -     Vitamin B12    2. Restless leg syndrome  -     pramipexole (Mirapex) 1 MG tablet; Take 1 tablet by mouth Every Night.  Dispense: 30 tablet; Refill: 3    3. Pernicious anemia    4. Elevated blood pressure reading without diagnosis of hypertension    5. Anxiety  -     ALPRAZolam (XANAX) 1 MG tablet; Take 1 tablet by mouth At Night As Needed for Sleep.  Dispense: 20 tablet; Refill: 0    plan:  1.physical: Physical exam conducted today,  Will obtain fasting lab work,   Impression: healthy adult Patient. Currently, patient eats a healthy diet. Screening lab work includes glucose, lipid profile , complete blood count and comprehensive panel . The risks and benefits  of immunizations were discussed and immunizations are up to date. Advice and education were given regarding nutrition and aerobic exercise. Patient discussion: discussed with the patient.  Physical with preventive exam as well as age and risk appropriate counseling completed.   Patient Discussion: plan discussed with the patient, follow-up visit needed in one year. Medication Review and medication list updated  Advised Monthly self breast assessment and annual breast imaging and Calcium, 600 mg/ Vit. D, 400 IU daily; regular weight-bearing exercise  2.  Restless leg syndrome: We will give a trial with Mirapex and obtain labs  3.  Pernicious anemia: im B12 given in office today  4.  Elevated blood pressure without a history of hypertension: Patient advised to monitor vitals and follow low-sodium diet  5.  Anxiety disorder: Continue Lexapro, as needed alprazolam             Uma Anne MD

## 2022-07-15 ENCOUNTER — TELEPHONE (OUTPATIENT)
Dept: INTERNAL MEDICINE | Facility: CLINIC | Age: 57
End: 2022-07-15

## 2022-07-15 NOTE — TELEPHONE ENCOUNTER
Patient did not complete MAMMOGRAM ordered on below.  This order is too old to be used and has been cancelled.

## 2022-07-28 ENCOUNTER — OFFICE VISIT (OUTPATIENT)
Dept: INTERNAL MEDICINE | Facility: CLINIC | Age: 57
End: 2022-07-28

## 2022-07-28 VITALS
BODY MASS INDEX: 23.22 KG/M2 | SYSTOLIC BLOOD PRESSURE: 149 MMHG | WEIGHT: 136 LBS | HEIGHT: 64 IN | TEMPERATURE: 97.8 F | HEART RATE: 62 BPM | RESPIRATION RATE: 16 BRPM | DIASTOLIC BLOOD PRESSURE: 93 MMHG | OXYGEN SATURATION: 98 %

## 2022-07-28 DIAGNOSIS — F51.04 INSOMNIA, PSYCHOPHYSIOLOGICAL: ICD-10-CM

## 2022-07-28 DIAGNOSIS — G25.81 RESTLESS LEG SYNDROME: ICD-10-CM

## 2022-07-28 DIAGNOSIS — R03.0 ELEVATED BLOOD PRESSURE READING WITHOUT DIAGNOSIS OF HYPERTENSION: Primary | ICD-10-CM

## 2022-07-28 PROCEDURE — 99214 OFFICE O/P EST MOD 30 MIN: CPT | Performed by: INTERNAL MEDICINE

## 2022-07-28 RX ORDER — ROPINIROLE 0.5 MG/1
0.5 TABLET, FILM COATED ORAL EVERY EVENING
Qty: 30 TABLET | Refills: 4 | Status: SHIPPED | OUTPATIENT
Start: 2022-07-28 | End: 2022-08-15

## 2022-07-28 NOTE — PROGRESS NOTES
"Chief Complaint  Hypertension, Insomnia, and Restless Legs Syndrome    Subjective        Ernestina Medina presents to [facility]  History of Present Illness  Patient is here to follow-up on her blood pressure which generally be elevated, she is advised to monitor vitals at home and follow low-sodium diet, she is also to follow-up on restless legs for which she was given Mirapex but it caused her to have nausea and vomiting, she is also to follow-up on insomnia for which she was given alprazolam 1 mg and it did not put her to sleep but when she slept she was asleep for 3 hours and she said it was a deep sleep    Objective   Vital Signs:  /93   Pulse 62   Temp 97.8 °F (36.6 °C)   Resp 16   Ht 162.6 cm (64.02\")   Wt 61.7 kg (136 lb)   SpO2 98%   BMI 23.33 kg/m²   Estimated body mass index is 23.33 kg/m² as calculated from the following:    Height as of this encounter: 162.6 cm (64.02\").    Weight as of this encounter: 61.7 kg (136 lb).  Vitals:    07/28/22 1535 07/28/22 1617   BP: 174/93 149/93   Pulse: 65 62   Resp: 16    Temp: 97.8 °F (36.6 °C)    SpO2: 98%    Weight: 61.7 kg (136 lb)    Height: 162.6 cm (64.02\")        BMI is within normal parameters. No other follow-up for BMI required.      Physical Exam  Vitals and nursing note reviewed.   Constitutional:       General: She is not in acute distress.     Appearance: Normal appearance. She is not diaphoretic.   HENT:      Head: Normocephalic and atraumatic.      Right Ear: External ear normal.      Left Ear: External ear normal.      Nose: Nose normal.   Eyes:      Extraocular Movements: Extraocular movements intact.      Conjunctiva/sclera: Conjunctivae normal.   Neck:      Trachea: Trachea normal.   Cardiovascular:      Rate and Rhythm: Normal rate and regular rhythm.      Heart sounds: Normal heart sounds.   Pulmonary:      Effort: Pulmonary effort is normal. No respiratory distress.      Breath sounds: Normal " breath sounds.   Abdominal:      General: Abdomen is flat.   Musculoskeletal:      Cervical back: Neck supple.      Comments: Moves all limbs   Skin:     General: Skin is warm.   Neurological:      Mental Status: She is alert and oriented to person, place, and time.      Comments: No gross motor or sensory deficits        Result Review :    CMP    CMP 12/9/21   Glucose 100 (A)   BUN 14   Creatinine 0.58   eGFR Non African Am 108   Sodium 140   Potassium 4.3   Chloride 104   Calcium 9.7   (A) Abnormal value            CBC    CBC 12/9/21   WBC 4.79   RBC 3.89   Hemoglobin 12.2   Hematocrit 38.0   MCV 97.7 (A)   MCH 31.4   MCHC 32.1   RDW 12.6   Platelets 255   (A) Abnormal value                      Assessment and Plan   Diagnoses and all orders for this visit:    1. Elevated blood pressure reading without diagnosis of hypertension (Primary)    2. Insomnia, psychophysiological    3. Restless leg syndrome  -     rOPINIRole (Requip) 0.5 MG tablet; Take 1 tablet by mouth Every Evening.  Dispense: 30 tablet; Refill: 4    Plan:  1.elevated blood pressure without diagnosis of hypertension: Monitor vitals, low-sodium diet  2.  Restless leg syndrome: We will give a trial with Requip  3.  Insomnia: We will continue alprazolam     I spent 30 minutes caring for Ernestina on this date of service. This time includes time spent by me in the following activities:preparing for the visit, reviewing tests, performing a medically appropriate examination and/or evaluation , counseling and educating the patient/family/caregiver, ordering medications, tests, or procedures and documenting information in the medical record  Follow Up   Patient was given instructions and counseling regarding her condition or for health maintenance advice. Please see specific information pulled into the AVS if appropriate.

## 2022-08-15 ENCOUNTER — OFFICE VISIT (OUTPATIENT)
Dept: INTERNAL MEDICINE | Facility: CLINIC | Age: 57
End: 2022-08-15

## 2022-08-15 VITALS
SYSTOLIC BLOOD PRESSURE: 171 MMHG | RESPIRATION RATE: 16 BRPM | DIASTOLIC BLOOD PRESSURE: 87 MMHG | HEART RATE: 56 BPM | TEMPERATURE: 97.3 F | WEIGHT: 139 LBS | OXYGEN SATURATION: 98 % | HEIGHT: 64 IN | BODY MASS INDEX: 23.73 KG/M2

## 2022-08-15 DIAGNOSIS — I10 BENIGN ESSENTIAL HYPERTENSION: ICD-10-CM

## 2022-08-15 DIAGNOSIS — D51.8 OTHER VITAMIN B12 DEFICIENCY ANEMIA: ICD-10-CM

## 2022-08-15 DIAGNOSIS — T50.905A ADVERSE EFFECT OF DRUG, INITIAL ENCOUNTER: ICD-10-CM

## 2022-08-15 DIAGNOSIS — F41.9 ANXIETY: Primary | ICD-10-CM

## 2022-08-15 PROCEDURE — 96372 THER/PROPH/DIAG INJ SC/IM: CPT | Performed by: INTERNAL MEDICINE

## 2022-08-15 PROCEDURE — 99214 OFFICE O/P EST MOD 30 MIN: CPT | Performed by: INTERNAL MEDICINE

## 2022-08-15 RX ADMIN — CYANOCOBALAMIN 1000 MCG: 1000 INJECTION, SOLUTION INTRAMUSCULAR; SUBCUTANEOUS at 11:41

## 2022-08-15 NOTE — PROGRESS NOTES
"Chief Complaint  Hypertension    Subjective        Ernestina Medina presents to [facility]  History of Present Illness  Patient is here to follow-up on her blood pressure which is noted to be elevated, is also to follow-up on anxiety and is currently taking Lexapro, she is here to follow up on restless leg syndrome and she states one night she passed out after taking Requip and then a week ago she felt dizzy and fell down and scraped  Her knee around 11 am, she is advised to stop the medication at this time  Objective   Vital Signs:  /87   Pulse 56   Temp 97.3 °F (36.3 °C)   Resp 16   Ht 162.6 cm (64.02\")   Wt 63 kg (139 lb)   SpO2 98%   BMI 23.85 kg/m²   Estimated body mass index is 23.85 kg/m² as calculated from the following:    Height as of this encounter: 162.6 cm (64.02\").    Weight as of this encounter: 63 kg (139 lb).    BMI is within normal parameters. No other follow-up for BMI required.      Physical Exam  Vitals and nursing note reviewed.   Constitutional:       General: She is not in acute distress.     Appearance: Normal appearance. She is not diaphoretic.   HENT:      Head: Normocephalic and atraumatic.      Right Ear: External ear normal.      Left Ear: External ear normal.      Nose: Nose normal.   Eyes:      Extraocular Movements: Extraocular movements intact.      Conjunctiva/sclera: Conjunctivae normal.   Neck:      Trachea: Trachea normal.   Cardiovascular:      Rate and Rhythm: Normal rate and regular rhythm.      Heart sounds: Normal heart sounds.   Pulmonary:      Effort: Pulmonary effort is normal. No respiratory distress.      Breath sounds: Normal breath sounds.   Abdominal:      General: Abdomen is flat.   Musculoskeletal:      Cervical back: Neck supple.      Comments: Moves all limbs   Skin:     General: Skin is warm.      Comments: Superficial skin abrasion left knee   Neurological:      Mental Status: She is alert and oriented to " person, place, and time.      Comments: No gross motor or sensory deficits        Result Review :    CMP    CMP 12/9/21   Glucose 100 (A)   BUN 14   Creatinine 0.58   eGFR Non African Am 108   Sodium 140   Potassium 4.3   Chloride 104   Calcium 9.7   (A) Abnormal value            CBC    CBC 12/9/21   WBC 4.79   RBC 3.89   Hemoglobin 12.2   Hematocrit 38.0   MCV 97.7 (A)   MCH 31.4   MCHC 32.1   RDW 12.6   Platelets 255   (A) Abnormal value                      Assessment and Plan   Diagnoses and all orders for this visit:    1. Anxiety (Primary)    2. Other vitamin B12 deficiency anemia    3. Adverse effect of drug, initial encounter    4. Benign essential hypertension    Plan:  1.anxiety disorder: To continue Lexapro  2.  B12 deficiency: B12 IM given in office today  3.  Adverse drug effect: To stop Requip  4.  Benign essential hypertension: Patient advised to monitor blood pressure and follow low-sodium diet     I spent 30 minutes caring for Ernestina on this date of service. This time includes time spent by me in the following activities:preparing for the visit, reviewing tests, performing a medically appropriate examination and/or evaluation , counseling and educating the patient/family/caregiver, ordering medications, tests, or procedures and documenting information in the medical record  Follow Up    Patient was given instructions and counseling regarding her condition or for health maintenance advice. Please see specific information pulled into the AVS if appropriate.

## 2022-09-01 ENCOUNTER — OFFICE VISIT (OUTPATIENT)
Dept: INTERNAL MEDICINE | Facility: CLINIC | Age: 57
End: 2022-09-01

## 2022-09-01 ENCOUNTER — HOSPITAL ENCOUNTER (OUTPATIENT)
Dept: GENERAL RADIOLOGY | Facility: HOSPITAL | Age: 57
Discharge: HOME | End: 2022-09-01
Admitting: INTERNAL MEDICINE

## 2022-09-01 VITALS
SYSTOLIC BLOOD PRESSURE: 102 MMHG | WEIGHT: 135 LBS | DIASTOLIC BLOOD PRESSURE: 70 MMHG | BODY MASS INDEX: 23.05 KG/M2 | OXYGEN SATURATION: 100 % | HEART RATE: 65 BPM | HEIGHT: 64 IN | TEMPERATURE: 97.3 F

## 2022-09-01 DIAGNOSIS — Z79.899 MEDICATION MANAGEMENT: ICD-10-CM

## 2022-09-01 DIAGNOSIS — D51.0 PERNICIOUS ANEMIA: ICD-10-CM

## 2022-09-01 DIAGNOSIS — F33.0 MILD EPISODE OF RECURRENT MAJOR DEPRESSIVE DISORDER: ICD-10-CM

## 2022-09-01 DIAGNOSIS — M25.551 PAIN IN JOINT OF RIGHT HIP: ICD-10-CM

## 2022-09-01 DIAGNOSIS — F51.04 INSOMNIA, PSYCHOPHYSIOLOGICAL: ICD-10-CM

## 2022-09-01 DIAGNOSIS — F41.9 ANXIETY: Primary | ICD-10-CM

## 2022-09-01 PROCEDURE — 99214 OFFICE O/P EST MOD 30 MIN: CPT | Performed by: INTERNAL MEDICINE

## 2022-09-01 PROCEDURE — 73502 X-RAY EXAM HIP UNI 2-3 VIEWS: CPT

## 2022-09-01 PROCEDURE — 96372 THER/PROPH/DIAG INJ SC/IM: CPT | Performed by: INTERNAL MEDICINE

## 2022-09-01 RX ORDER — ESCITALOPRAM OXALATE 20 MG/1
20 TABLET ORAL DAILY
Qty: 90 TABLET | Refills: 1 | Status: SHIPPED | OUTPATIENT
Start: 2022-09-01

## 2022-09-01 RX ORDER — ALPRAZOLAM 1 MG/1
1 TABLET ORAL NIGHTLY PRN
Qty: 30 TABLET | Refills: 5 | Status: SHIPPED | OUTPATIENT
Start: 2022-09-01 | End: 2023-01-10 | Stop reason: SDUPTHER

## 2022-09-01 RX ORDER — METHYLPREDNISOLONE ACETATE 80 MG/ML
80 INJECTION, SUSPENSION INTRA-ARTICULAR; INTRALESIONAL; INTRAMUSCULAR; SOFT TISSUE ONCE
Status: DISCONTINUED | OUTPATIENT
Start: 2022-09-01 | End: 2022-09-01

## 2022-09-01 RX ORDER — METHYLPREDNISOLONE ACETATE 40 MG/ML
80 INJECTION, SUSPENSION INTRA-ARTICULAR; INTRALESIONAL; INTRAMUSCULAR; SOFT TISSUE ONCE
Status: COMPLETED | OUTPATIENT
Start: 2022-09-01 | End: 2022-09-01

## 2022-09-01 RX ADMIN — CYANOCOBALAMIN 1000 MCG: 1000 INJECTION, SOLUTION INTRAMUSCULAR; SUBCUTANEOUS at 13:33

## 2022-09-01 RX ADMIN — METHYLPREDNISOLONE ACETATE 80 MG: 40 INJECTION, SUSPENSION INTRA-ARTICULAR; INTRALESIONAL; INTRAMUSCULAR; SOFT TISSUE at 13:53

## 2022-09-01 NOTE — PROGRESS NOTES
"Chief Complaint  Anxiety and Joint Pain    Subjective        Ernestina Medina presents to [facility] PRIMARY [facility]  History of Present Illness  Patient is here to follow-up on anxiety and depression, she is taking Lexapro as prescribed, she also here to follow-up on insomnia and has tried alprazolam which is helping her symptoms and she takes it as needed, she complains of right hip joint pain which has been worsening, she needs a B12 shot for pernicious anemia      Objective   Vital Signs:  /70   Pulse 65   Temp 97.3 °F (36.3 °C)   Ht 162.6 cm (64\")   Wt 61.2 kg (135 lb)   SpO2 100%   BMI 23.17 kg/m²   Estimated body mass index is 23.17 kg/m² as calculated from the following:    Height as of this encounter: 162.6 cm (64\").    Weight as of this encounter: 61.2 kg (135 lb).    BMI is within normal parameters. No other follow-up for BMI required.      Physical Exam  Vitals and nursing note reviewed.   Constitutional:       General: She is not in acute distress.     Appearance: Normal appearance. She is not diaphoretic.   HENT:      Head: Normocephalic and atraumatic.      Right Ear: External ear normal.      Left Ear: External ear normal.      Nose: Nose normal.   Eyes:      Extraocular Movements: Extraocular movements intact.      Conjunctiva/sclera: Conjunctivae normal.   Neck:      Trachea: Trachea normal.   Cardiovascular:      Rate and Rhythm: Normal rate and regular rhythm.      Heart sounds: Normal heart sounds.   Pulmonary:      Effort: Pulmonary effort is normal. No respiratory distress.      Breath sounds: Normal breath sounds.   Abdominal:      General: Abdomen is flat.   Musculoskeletal:      Cervical back: Neck supple.      Comments: Moves all limbs   Skin:     General: Skin is warm.   Neurological:      Mental Status: She is alert and oriented to person, place, and time.      Comments: No gross motor or sensory deficits        Result Review :              "   Assessment and Plan   Diagnoses and all orders for this visit:    1. Anxiety (Primary)  -     escitalopram (Lexapro) 20 MG tablet; Take 1 tablet by mouth Daily.  Dispense: 90 tablet; Refill: 1    2. Pernicious anemia    3. Pain in joint of right hip  -     Discontinue: methylPREDNISolone acetate (DEPO-medrol) injection 80 mg  -     XR Hip With or Without Pelvis 2 - 3 View Right  -     methylPREDNISolone acetate (DEPO-medrol) injection 80 mg    4. Mild episode of recurrent major depressive disorder (HCC)  -     escitalopram (Lexapro) 20 MG tablet; Take 1 tablet by mouth Daily.  Dispense: 90 tablet; Refill: 1    5. Insomnia, psychophysiological     -     Compliance Drug Analysis, Ur - Urine, Clean Catch  -     ALPRAZolam (XANAX) 1 MG tablet; Take 1 tablet by mouth At Night As Needed for Sleep.  Dispense: 30 tablet; Refill: 5    Plan:   1.anxiety disorder: Refill Lexapro 20 mg daily  2.  Major depression: Refill Lexapro 20 mg daily  3.  Pernicious anemia: IM B12 given in office today  4.  Insomnia: UDS and David reviewed and obtained and narcotic contract signed, refill alprazolam as needed  5.  Right hip joint pain: We will obtain x-ray, IM Depo-Medrol given in office today     I spent 30 minutes caring for Ernestina on this date of service. This time includes time spent by me in the following activities:preparing for the visit, reviewing tests, performing a medically appropriate examination and/or evaluation , counseling and educating the patient/family/caregiver, ordering medications, tests, or procedures and documenting information in the medical record  Follow Up   Return in about 15 weeks (around 12/15/2022).  Patient was given instructions and counseling regarding her condition or for health maintenance advice. Please see specific information pulled into the AVS if appropriate.

## 2022-09-09 LAB — DRUGS UR: NORMAL

## 2022-09-21 ENCOUNTER — CLINICAL SUPPORT (OUTPATIENT)
Dept: INTERNAL MEDICINE | Facility: CLINIC | Age: 57
End: 2022-09-21

## 2022-09-21 DIAGNOSIS — E53.8 B12 DEFICIENCY: ICD-10-CM

## 2022-09-21 PROCEDURE — 96372 THER/PROPH/DIAG INJ SC/IM: CPT | Performed by: INTERNAL MEDICINE

## 2022-09-21 RX ADMIN — CYANOCOBALAMIN 1000 MCG: 1000 INJECTION, SOLUTION INTRAMUSCULAR; SUBCUTANEOUS at 15:06

## 2022-10-12 ENCOUNTER — OFFICE VISIT (OUTPATIENT)
Dept: INTERNAL MEDICINE | Facility: CLINIC | Age: 57
End: 2022-10-12

## 2022-10-12 VITALS
OXYGEN SATURATION: 97 % | BODY MASS INDEX: 23.39 KG/M2 | HEART RATE: 60 BPM | DIASTOLIC BLOOD PRESSURE: 100 MMHG | SYSTOLIC BLOOD PRESSURE: 150 MMHG | TEMPERATURE: 97.5 F | WEIGHT: 137 LBS | HEIGHT: 64 IN | RESPIRATION RATE: 16 BRPM

## 2022-10-12 DIAGNOSIS — R03.0 ELEVATED BLOOD PRESSURE READING WITHOUT DIAGNOSIS OF HYPERTENSION: Primary | ICD-10-CM

## 2022-10-12 DIAGNOSIS — H66.003 ACUTE SUPPURATIVE OTITIS MEDIA OF BOTH EARS WITHOUT SPONTANEOUS RUPTURE OF TYMPANIC MEMBRANES, RECURRENCE NOT SPECIFIED: ICD-10-CM

## 2022-10-12 DIAGNOSIS — D51.0 PERNICIOUS ANEMIA: ICD-10-CM

## 2022-10-12 PROCEDURE — 99214 OFFICE O/P EST MOD 30 MIN: CPT | Performed by: INTERNAL MEDICINE

## 2022-10-12 RX ORDER — CIPROFLOXACIN AND DEXAMETHASONE 3; 1 MG/ML; MG/ML
4 SUSPENSION/ DROPS AURICULAR (OTIC) 2 TIMES DAILY
Qty: 7.5 ML | Refills: 3 | Status: SHIPPED | OUTPATIENT
Start: 2022-10-12 | End: 2023-01-10

## 2022-10-12 RX ORDER — CEFDINIR 300 MG/1
300 CAPSULE ORAL 2 TIMES DAILY
Qty: 20 CAPSULE | Refills: 0 | Status: SHIPPED | OUTPATIENT
Start: 2022-10-12 | End: 2022-10-22

## 2022-10-12 NOTE — PROGRESS NOTES
"Chief Complaint  Ear Fullness (Popping hard to hear)    Subjective        Ernestina Medina presents to [facility] PRIMARY [facility]ARE  History of Present Illness  HPI: Patient is here  Complaining of being covid positive on 9-5-2022 and then had left ear pain and  Left nasal congestion  , The patient also complains of ear pain , nasal discharge ,  hoarseness   And sinus congestion since the past few days, patient has been trying over the counter medications with not much relief in the symptoms , patient also complains of cough   . Her  blood pressure is noted to be elevated  and she is advised to monitor vitals at home and follow low-sodium diet  Answers for HPI/ROS submitted by the patient on 10/12/2022  What is the primary reason for your visit?: Ear Pain      Objective   Vital Signs:  /100   Pulse 60   Temp 97.5 °F (36.4 °C)   Resp 16   Ht 162.6 cm (64.02\")   Wt 62.1 kg (137 lb)   SpO2 97%   BMI 23.50 kg/m²   Estimated body mass index is 23.5 kg/m² as calculated from the following:    Height as of this encounter: 162.6 cm (64.02\").    Weight as of this encounter: 62.1 kg (137 lb).  Vitals:    10/12/22 1450 10/12/22 1520   BP: 160/97 150/100   Pulse: 60    Resp: 16    Temp: 97.5 °F (36.4 °C)    SpO2: 97%    Weight: 62.1 kg (137 lb)    Height: 162.6 cm (64.02\")        BMI is within normal parameters. No other follow-up for BMI required.      Physical Exam  Vitals and nursing note reviewed.   Constitutional:       General: She is not in acute distress.     Appearance: Normal appearance. She is not diaphoretic.   HENT:      Head: Normocephalic and atraumatic.      Right Ear: External ear normal. Tympanic membrane is erythematous.      Left Ear: External ear normal. Tympanic membrane is injected.      Nose: Rhinorrhea present.   Eyes:      Extraocular Movements: Extraocular movements intact.      Conjunctiva/sclera: Conjunctivae normal.   Neck:      Trachea: Trachea normal. "   Cardiovascular:      Rate and Rhythm: Normal rate and regular rhythm.      Heart sounds: Normal heart sounds.   Pulmonary:      Effort: Pulmonary effort is normal. No respiratory distress.      Breath sounds: Normal breath sounds.   Abdominal:      General: Abdomen is flat.   Musculoskeletal:      Cervical back: Neck supple.      Comments: Moves all limbs   Skin:     General: Skin is warm.   Neurological:      Mental Status: She is alert and oriented to person, place, and time.      Comments: No gross motor or sensory deficits        Result Review :                Assessment and Plan   Diagnoses and all orders for this visit:    1. Elevated blood pressure reading without diagnosis of hypertension (Primary)    2. Acute suppurative otitis media of both ears without spontaneous rupture of tympanic membranes, recurrence not specified  -     cefdinir (OMNICEF) 300 MG capsule; Take 1 capsule by mouth 2 (Two) Times a Day for 10 days.  Dispense: 20 capsule; Refill: 0  -     ciprofloxacin-dexamethasone (Ciprodex) 0.3-0.1 % otic suspension; Administer 4 drops into both ears 2 (Two) Times a Day.  Dispense: 7.5 mL; Refill: 3    3. Pernicious anemia    plan:  1.  Elevated blood pressure without diagnosis of hypertension: Patient advised to monitor vitals at home and follow low-sodium diet  2.  Acute suppurative otitis media: We will start oral antibiotics and topical Ciprodex  3.  Pernicious anemia: IM B12 given in office today     I spent 30 minutes caring for Ernestina on this date of service. This time includes time spent by me in the following activities:preparing for the visit, reviewing tests, performing a medically appropriate examination and/or evaluation , counseling and educating the patient/family/caregiver, ordering medications, tests, or procedures and documenting information in the medical record  Follow Up    Patient was given instructions and counseling regarding her condition or for health maintenance advice.  Please see specific information pulled into the AVS if appropriate.

## 2022-12-14 ENCOUNTER — CLINICAL SUPPORT (OUTPATIENT)
Dept: INTERNAL MEDICINE | Facility: CLINIC | Age: 57
End: 2022-12-14

## 2022-12-14 DIAGNOSIS — E53.8 COBALAMIN DEFICIENCY: ICD-10-CM

## 2022-12-14 DIAGNOSIS — D51.8 OTHER VITAMIN B12 DEFICIENCY ANEMIA: ICD-10-CM

## 2022-12-14 DIAGNOSIS — D51.0 PERNICIOUS ANEMIA: Primary | ICD-10-CM

## 2022-12-14 PROCEDURE — 96372 THER/PROPH/DIAG INJ SC/IM: CPT | Performed by: INTERNAL MEDICINE

## 2022-12-14 RX ORDER — CYANOCOBALAMIN 1000 UG/ML
1000 INJECTION, SOLUTION INTRAMUSCULAR; SUBCUTANEOUS
Status: SHIPPED | OUTPATIENT
Start: 2022-12-14 | End: 2023-10-18

## 2022-12-14 RX ADMIN — CYANOCOBALAMIN 1000 MCG: 1000 INJECTION, SOLUTION INTRAMUSCULAR; SUBCUTANEOUS at 12:55

## 2023-01-10 ENCOUNTER — OFFICE VISIT (OUTPATIENT)
Dept: INTERNAL MEDICINE | Facility: CLINIC | Age: 58
End: 2023-01-10
Payer: OTHER GOVERNMENT

## 2023-01-10 VITALS
OXYGEN SATURATION: 99 % | HEIGHT: 64 IN | WEIGHT: 143 LBS | DIASTOLIC BLOOD PRESSURE: 70 MMHG | BODY MASS INDEX: 24.41 KG/M2 | TEMPERATURE: 97.3 F | HEART RATE: 67 BPM | SYSTOLIC BLOOD PRESSURE: 112 MMHG

## 2023-01-10 DIAGNOSIS — F33.0 MILD EPISODE OF RECURRENT MAJOR DEPRESSIVE DISORDER: ICD-10-CM

## 2023-01-10 DIAGNOSIS — F41.9 ANXIETY: ICD-10-CM

## 2023-01-10 DIAGNOSIS — I10 BENIGN ESSENTIAL HYPERTENSION: Primary | ICD-10-CM

## 2023-01-10 DIAGNOSIS — E53.8 B12 DEFICIENCY: ICD-10-CM

## 2023-01-10 PROCEDURE — 99214 OFFICE O/P EST MOD 30 MIN: CPT | Performed by: INTERNAL MEDICINE

## 2023-01-10 RX ORDER — ALPRAZOLAM 1 MG/1
1 TABLET ORAL NIGHTLY PRN
Qty: 30 TABLET | Refills: 5 | Status: SHIPPED | OUTPATIENT
Start: 2023-01-10

## 2023-01-10 NOTE — PROGRESS NOTES
Chief Complaint  Hypertension ( )    Subjective        Ernestina Medina presents to [facility] PRIMARY [facility]ARE  History of Present Illness  HPI: Patient is here to follow up on the blood pressure and her blood pressure has improved, she is currently not taking any medications, she is stressed out as her  has been diagnosed with glioblastoma, she is here to follow-up on anxiety and is taking her medications as prescribed, She also needs refills and a B12 shot today  Hypertension   Pertinent negatives include no chest pain, palpitations or shortness of breath.    Objective   Vital Signs:  /70   Pulse 67   Temp 97.3 °F (36.3 °C) (Infrared)   Ht 162.6 cm (64.02\")   Wt 64.9 kg (143 lb)   SpO2 99%   BMI 24.53 kg/m²   Estimated body mass index is 24.53 kg/m² as calculated from the following:    Height as of this encounter: 162.6 cm (64.02\").    Weight as of this encounter: 64.9 kg (143 lb).       BMI is within normal parameters. No other follow-up for BMI required.      Physical Exam  Vitals and nursing note reviewed.   Constitutional:       General: She is not in acute distress.     Appearance: Normal appearance. She is not diaphoretic.   HENT:      Head: Normocephalic and atraumatic.      Right Ear: External ear normal.      Left Ear: External ear normal.      Nose: Nose normal.   Eyes:      Extraocular Movements: Extraocular movements intact.      Conjunctiva/sclera: Conjunctivae normal.   Neck:      Trachea: Trachea normal.   Cardiovascular:      Rate and Rhythm: Normal rate and regular rhythm.      Heart sounds: Normal heart sounds.   Pulmonary:      Effort: Pulmonary effort is normal. No respiratory distress.      Breath sounds: Normal breath sounds.   Abdominal:      General: Abdomen is flat.   Musculoskeletal:      Cervical back: Neck supple.      Comments: Moves all limbs   Skin:     General: Skin is warm.   Neurological:      Mental Status: She is alert and oriented to  person, place, and time.      Comments: No gross motor or sensory deficits        Result Review :                         Assessment and Plan   Diagnoses and all orders for this visit:    1. Benign essential hypertension (Primary)    2. Anxiety  -     ALPRAZolam (XANAX) 1 MG tablet; Take 1 tablet by mouth At Night As Needed for Sleep.  Dispense: 30 tablet; Refill: 5    3. Mild episode of recurrent major depressive disorder (HCC)    4. B12 deficiency    Plan:  1.  Benign essential hypertension: Will continue current medication, low-sodium diet advised, Counseled to regularly check BP at home with goal averaging <130/80.   2.  Anxiety disorder: We will continue Lexapro 20 mg daily, as needed alprazolam  3.  Major depression: We will continue Lexapro 20 mg daily  4.  B12 deficiency: B12 IM given in office today     I spent 30 minutes caring for Ernestina on this date of service. This time includes time spent by me in the following activities:preparing for the visit, reviewing tests, performing a medically appropriate examination and/or evaluation , counseling and educating the patient/family/caregiver, ordering medications, tests, or procedures and documenting information in the medical record  Follow Up      Patient was given instructions and counseling regarding her condition or for health maintenance advice. Please see specific information pulled into the AVS if appropriate.

## 2023-02-02 ENCOUNTER — TELEPHONE (OUTPATIENT)
Dept: INTERNAL MEDICINE | Facility: CLINIC | Age: 58
End: 2023-02-02
Payer: OTHER GOVERNMENT

## 2023-02-02 NOTE — TELEPHONE ENCOUNTER
PHONE CALL FROM [employer].  THEY NEED DIAGNOSIS CODES FOR LABS DONE ON 9/22/22.      PLEASE CALL [phone]   REF # [id number]

## 2023-02-02 NOTE — TELEPHONE ENCOUNTER
Spoke with [facility] and this being denied due to not being a medical necessity. Do you want to add any idc 10 codes?

## 2023-02-03 NOTE — TELEPHONE ENCOUNTER
The diagnosis you used was anxiety which wont cover a UDS. The diagnosis needs to be changed to medication management and then it should be covered.

## 2023-02-03 NOTE — TELEPHONE ENCOUNTER
Can u check on this , it says no diagnosis but I cant order the uds without a diagnosis on the computer [ hard stop ]

## 2023-04-10 ENCOUNTER — OFFICE VISIT (OUTPATIENT)
Dept: INTERNAL MEDICINE | Facility: CLINIC | Age: 58
End: 2023-04-10
Payer: OTHER GOVERNMENT

## 2023-04-10 VITALS
TEMPERATURE: 97.4 F | OXYGEN SATURATION: 100 % | HEART RATE: 60 BPM | HEIGHT: 64 IN | SYSTOLIC BLOOD PRESSURE: 142 MMHG | RESPIRATION RATE: 16 BRPM | BODY MASS INDEX: 24.24 KG/M2 | DIASTOLIC BLOOD PRESSURE: 84 MMHG | WEIGHT: 142 LBS

## 2023-04-10 DIAGNOSIS — D51.0 PERNICIOUS ANEMIA: ICD-10-CM

## 2023-04-10 DIAGNOSIS — F41.9 ANXIETY: ICD-10-CM

## 2023-04-10 DIAGNOSIS — I10 BENIGN ESSENTIAL HYPERTENSION: Primary | ICD-10-CM

## 2023-04-10 DIAGNOSIS — F51.04 INSOMNIA, PSYCHOPHYSIOLOGICAL: ICD-10-CM

## 2023-04-10 RX ORDER — ALPRAZOLAM 1 MG/1
TABLET ORAL
Qty: 60 TABLET | Refills: 4 | Status: SHIPPED | OUTPATIENT
Start: 2023-04-10

## 2023-04-10 RX ORDER — CYANOCOBALAMIN 1000 UG/ML
1000 INJECTION, SOLUTION INTRAMUSCULAR; SUBCUTANEOUS
Status: SHIPPED | OUTPATIENT
Start: 2023-04-10 | End: 2023-09-25

## 2023-04-10 RX ADMIN — CYANOCOBALAMIN 1000 MCG: 1000 INJECTION, SOLUTION INTRAMUSCULAR; SUBCUTANEOUS at 12:46

## 2023-04-10 NOTE — PROGRESS NOTES
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    Weight as of this encounter: 64.4 kg (142 lb).     Vitals:    04/10/23 1133 04/10/23 1246   BP: 151/93 142/84   Pulse: 60    Resp: 16    Temp: 97.4 °F (36.3 °C)    SpO2: 100%    Weight: 64.4 kg (142 lb)    Height: 162.6 cm (64.02\")        BMI is within normal parameters. No other follow-up for BMI required.      Physical Exam  Vitals and nursing note reviewed.   Constitutional:       General: She is not in acute distress.     Appearance: Normal appearance. She is not diaphoretic.   HENT:      Head: Normocephalic and atraumatic.      Right Ear: External ear normal.      Left Ear: External ear normal.      Nose: Nose normal.   Eyes:      Extraocular Movements: Extraocular movements intact.      Conjunctiva/sclera: Conjunctivae normal.   Neck:      Trachea: Trachea normal.   Cardiovascular:      Rate and Rhythm: Normal rate and regular rhythm.      Heart sounds: Normal heart sounds.   Pulmonary:      Effort: Pulmonary effort is normal. No respiratory distress.      Breath sounds: Normal breath sounds.   Abdominal:      General: Abdomen is flat.   Musculoskeletal:      Cervical back: Neck supple.      " Comments: Moves all limbs   Skin:     General: Skin is warm.   Neurological:      Mental Status: She is alert and oriented to person, place, and time.      Comments: No gross motor or sensory deficits        Result Review :                   Assessment and Plan   Diagnoses and all orders for this visit:    1. Benign essential hypertension (Primary)    2. Anxiety  -     ALPRAZolam (XANAX) 1 MG tablet; Take 1-2 tabs at bedtime as needed for anxiety/sleep  Dispense: 60 tablet; Refill: 4    3. Insomnia, psychophysiological  -     ALPRAZolam (XANAX) 1 MG tablet; Take 1-2 tabs at bedtime as needed for anxiety/sleep  Dispense: 60 tablet; Refill: 4    4. Pernicious anemia  -     cyanocobalamin injection 1,000 mcg    Plan:  1.  Benign essential hypertension:   low-sodium diet advised, Counseled to regularly check BP at home with goal averaging <130/80.   2 anxiety disorder: Belen reviewed, changed to alprazolam 1 mg 1 to 2 tablets to be taken as needed  3.  Insomnia : UDS and David reviewed, changed to alprazolam 1 mg 1 to 2 tablets to be taken as needed  4.  Pernicious anemia: B12 IM given in office today and well-tolerated       I spent 30 minutes caring for Ernestina on this date of service. This time includes time spent by me in the following activities:preparing for the visit, reviewing tests, performing a medically appropriate examination and/or evaluation , counseling and educating the patient/family/caregiver, ordering medications, tests, or procedures and documenting information in the medical record  Follow Up   No follow-ups on file.  Patient was given instructions and counseling regarding her condition or for health maintenance advice. Please see specific information pulled into the AVS if appropriate.

## 2023-08-01 ENCOUNTER — CLINICAL SUPPORT (OUTPATIENT)
Dept: INTERNAL MEDICINE | Facility: CLINIC | Age: 58
End: 2023-08-01
Payer: OTHER GOVERNMENT

## 2023-08-01 DIAGNOSIS — E53.8 COBALAMIN DEFICIENCY: Primary | ICD-10-CM

## 2023-08-01 PROCEDURE — 96372 THER/PROPH/DIAG INJ SC/IM: CPT | Performed by: INTERNAL MEDICINE

## 2023-08-01 RX ADMIN — CYANOCOBALAMIN 1000 MCG: 1000 INJECTION, SOLUTION INTRAMUSCULAR; SUBCUTANEOUS at 15:21

## 2023-08-10 DIAGNOSIS — R07.81 RIB PAIN ON LEFT SIDE: Primary | ICD-10-CM

## 2023-08-14 ENCOUNTER — HOSPITAL ENCOUNTER (OUTPATIENT)
Dept: GENERAL RADIOLOGY | Facility: HOSPITAL | Age: 58
Discharge: HOME | End: 2023-08-14
Admitting: INTERNAL MEDICINE
Payer: OTHER GOVERNMENT

## 2023-08-14 PROCEDURE — 71101 X-RAY EXAM UNILAT RIBS/CHEST: CPT

## 2023-08-31 ENCOUNTER — OFFICE VISIT (OUTPATIENT)
Dept: INTERNAL MEDICINE | Facility: CLINIC | Age: 58
End: 2023-08-31
Payer: OTHER GOVERNMENT

## 2023-08-31 VITALS
WEIGHT: 144 LBS | TEMPERATURE: 97.7 F | RESPIRATION RATE: 16 BRPM | BODY MASS INDEX: 24.59 KG/M2 | OXYGEN SATURATION: 100 % | SYSTOLIC BLOOD PRESSURE: 144 MMHG | HEIGHT: 64 IN | HEART RATE: 58 BPM | DIASTOLIC BLOOD PRESSURE: 88 MMHG

## 2023-08-31 DIAGNOSIS — E53.8 B12 DEFICIENCY: ICD-10-CM

## 2023-08-31 DIAGNOSIS — F51.04 INSOMNIA, PSYCHOPHYSIOLOGICAL: ICD-10-CM

## 2023-08-31 DIAGNOSIS — F41.9 ANXIETY: ICD-10-CM

## 2023-08-31 DIAGNOSIS — I10 BENIGN ESSENTIAL HYPERTENSION: Primary | ICD-10-CM

## 2023-08-31 RX ORDER — FLUOXETINE HYDROCHLORIDE 20 MG/1
20 CAPSULE ORAL DAILY
Qty: 30 CAPSULE | Refills: 5 | Status: SHIPPED | OUTPATIENT
Start: 2023-08-31

## 2023-08-31 RX ORDER — ALPRAZOLAM 1 MG/1
TABLET ORAL
Qty: 30 TABLET | Refills: 1 | Status: CANCELLED | OUTPATIENT
Start: 2023-08-31

## 2023-08-31 RX ADMIN — CYANOCOBALAMIN 1000 MCG: 1000 INJECTION, SOLUTION INTRAMUSCULAR; SUBCUTANEOUS at 14:26

## 2023-08-31 NOTE — PROGRESS NOTES
"Chief Complaint  Hypertension (Follow up)    Subjective        Ernestina Medina presents to [facility]  Patient is here to follow-up on blood pressure which is noted to be elevated, she is very stressed out as her  is diagnosed with glioblastoma, she complains of anxiety and insomnia, she states the Lexapro does not help her symptoms, she states she is taking Prozac in the past, she needs a B12 shot today  Hypertension    Objective   Vital Signs:  /88   Pulse 58   Temp 97.7 °F (36.5 °C)   Resp 16   Ht 162.6 cm (64.02\")   Wt 65.3 kg (144 lb)   SpO2 100%   BMI 24.71 kg/m²   Estimated body mass index is 24.71 kg/m² as calculated from the following:    Height as of this encounter: 162.6 cm (64.02\").    Weight as of this encounter: 65.3 kg (144 lb).       BMI is within normal parameters. No other follow-up for BMI required.      Physical Exam  Vitals and nursing note reviewed.   Constitutional:       General: She is not in acute distress.     Appearance: Normal appearance. She is not diaphoretic.   HENT:      Head: Normocephalic and atraumatic.      Right Ear: External ear normal.      Left Ear: External ear normal.      Nose: Nose normal.   Eyes:      Extraocular Movements: Extraocular movements intact.      Conjunctiva/sclera: Conjunctivae normal.   Neck:      Trachea: Trachea normal.   Cardiovascular:      Rate and Rhythm: Normal rate and regular rhythm.      Heart sounds: Normal heart sounds.   Pulmonary:      Effort: Pulmonary effort is normal. No respiratory distress.      Breath sounds: Normal breath sounds.   Abdominal:      General: Abdomen is flat.   Musculoskeletal:      Cervical back: Neck supple.      Comments: Moves all limbs   Skin:     General: Skin is warm.   Neurological:      Mental Status: She is alert and oriented to person, place, and time.      Comments: No gross motor or sensory deficits      Result Review :    The following data was " reviewed by: Uma Anne MD on 08/31/2023:  CMP          7/2/2023    21:52   CMP   Glucose 91    BUN 16    Creatinine 0.54    EGFR 108.2    Sodium 144    Potassium 4.2    Chloride 105    Calcium 9.6    Total Protein 7.5    Albumin 4.9    Globulin 2.6    Total Bilirubin 0.2    Alkaline Phosphatase 86    AST (SGOT) 21    ALT (SGPT) 16    Albumin/Globulin Ratio 1.9    BUN/Creatinine Ratio 29.6    Anion Gap 15.2      CBC          7/2/2023    21:52   CBC   WBC 5.68    RBC 3.95    Hemoglobin 12.2    Hematocrit 36.9    MCV 93.4    MCH 30.9    MCHC 33.1    RDW 13.2    Platelets 257                   Assessment and Plan   Diagnoses and all orders for this visit:    1. Benign essential hypertension (Primary)    2. Anxiety  -     Compliance Drug Analysis, Ur - Urine, Clean Catch  -     FLUoxetine (PROzac) 20 MG capsule; Take 1 capsule by mouth Daily.  Dispense: 30 capsule; Refill: 5  -     ALPRAZolam (XANAX) 1 MG tablet; TAKE 1 TABLET BY MOUTH AT NIGHT AS NEEDED FOR SLEEP  Dispense: 30 tablet; Refill: 3    3. Insomnia, psychophysiological  -     Compliance Drug Analysis, Ur - Urine, Clean Catch  -     ALPRAZolam (XANAX) 1 MG tablet; TAKE 1 TABLET BY MOUTH AT NIGHT AS NEEDED FOR SLEEP  Dispense: 30 tablet; Refill: 3    4. B12 deficiency    Plan:  1.  Benign essential hypertension: Will continue current medication, low-sodium diet advised, Counseled to regularly check BP at home with goal averaging <130/80.   2.  Anxiety disorder: Taper off Lexapro, will give a trial with Prozac, SANCHEZ and David reviewed and obtained and refilled alprazolam  3.  Insomnia: Refill alprazolam  4.  B12 deficiency: B12 IM given in office today     I spent 30 minutes caring for Ernestina on this date of service. This time includes time spent by me in the following activities:preparing for the visit, reviewing tests, performing a medically appropriate examination and/or evaluation , counseling and educating the patient/family/caregiver, ordering  medications, tests, or procedures, and documenting information in the medical record  Follow Up   Return in about 2 months (around 10/31/2023).  Patient was given instructions and counseling regarding her condition or for health maintenance advice. Please see specific information pulled into the AVS if appropriate.

## 2023-09-05 RX ORDER — ALPRAZOLAM 1 MG/1
TABLET ORAL
Qty: 30 TABLET | Refills: 3 | Status: SHIPPED | OUTPATIENT
Start: 2023-09-05

## 2023-09-07 LAB — DRUGS UR: NORMAL

## 2023-10-30 ENCOUNTER — OFFICE VISIT (OUTPATIENT)
Dept: INTERNAL MEDICINE | Facility: CLINIC | Age: 58
End: 2023-10-30
Payer: OTHER GOVERNMENT

## 2023-10-30 VITALS
SYSTOLIC BLOOD PRESSURE: 139 MMHG | RESPIRATION RATE: 16 BRPM | OXYGEN SATURATION: 100 % | TEMPERATURE: 97.8 F | BODY MASS INDEX: 25.1 KG/M2 | HEART RATE: 65 BPM | DIASTOLIC BLOOD PRESSURE: 90 MMHG | WEIGHT: 147 LBS | HEIGHT: 64 IN

## 2023-10-30 DIAGNOSIS — J45.21 MILD INTERMITTENT ASTHMA WITH ACUTE EXACERBATION: ICD-10-CM

## 2023-10-30 DIAGNOSIS — F41.9 ANXIETY: ICD-10-CM

## 2023-10-30 DIAGNOSIS — J32.0 CHRONIC MAXILLARY SINUSITIS: ICD-10-CM

## 2023-10-30 DIAGNOSIS — E53.8 B12 DEFICIENCY: ICD-10-CM

## 2023-10-30 DIAGNOSIS — R03.0 ELEVATED BLOOD PRESSURE READING WITHOUT DIAGNOSIS OF HYPERTENSION: Primary | ICD-10-CM

## 2023-10-30 DIAGNOSIS — F51.04 INSOMNIA, PSYCHOPHYSIOLOGICAL: ICD-10-CM

## 2023-10-30 RX ORDER — CYANOCOBALAMIN 1000 UG/ML
1000 INJECTION, SOLUTION INTRAMUSCULAR; SUBCUTANEOUS
Status: SHIPPED | OUTPATIENT
Start: 2023-10-30 | End: 2024-04-15

## 2023-10-30 RX ORDER — ALBUTEROL SULFATE 90 UG/1
1 AEROSOL, METERED RESPIRATORY (INHALATION) EVERY 6 HOURS PRN
Qty: 18 G | Refills: 3 | Status: SHIPPED | OUTPATIENT
Start: 2023-10-30

## 2023-10-30 RX ORDER — FLUOXETINE HYDROCHLORIDE 40 MG/1
40 CAPSULE ORAL DAILY
Qty: 90 CAPSULE | Refills: 1 | Status: SHIPPED | OUTPATIENT
Start: 2023-10-30

## 2023-10-30 RX ORDER — MONTELUKAST SODIUM 10 MG/1
10 TABLET ORAL NIGHTLY
Qty: 90 TABLET | Refills: 1 | Status: SHIPPED | OUTPATIENT
Start: 2023-10-30

## 2023-10-30 RX ADMIN — CYANOCOBALAMIN 1000 MCG: 1000 INJECTION, SOLUTION INTRAMUSCULAR; SUBCUTANEOUS at 14:22

## 2023-10-30 NOTE — PROGRESS NOTES
"Chief Complaint  Asthma and Anxiety    Subjective        Ernestina Medina presents to [facility]  History of Present Illness  Patient is here complaining of asthma, she states she has not had asthma symptoms until the past few days, she started wheezing and she also has an intermittent cough, she also complains of anxiety and insomnia mostly secondary to the stress of her  having cancer, her blood pressure is noted to be elevated today, she needs a B12 shot, she also complains of chronic sinus congestion and needs a B12 shot today  Objective   Vital Signs:  /90   Pulse 65   Temp 97.8 °F (36.6 °C)   Resp 16   Ht 162.6 cm (64.02\")   Wt 66.7 kg (147 lb)   SpO2 100%   BMI 25.22 kg/m²   Estimated body mass index is 25.22 kg/m² as calculated from the following:    Height as of this encounter: 162.6 cm (64.02\").    Weight as of this encounter: 66.7 kg (147 lb).       BMI is >= 25 and <30. (Overweight) The following options were offered after discussion;: weight loss educational material (shared in after visit summary), exercise counseling/recommendations, and nutrition counseling/recommendations      Physical Exam  Vitals and nursing note reviewed.   Constitutional:       General: She is not in acute distress.     Appearance: Normal appearance. She is not diaphoretic.   HENT:      Head: Normocephalic and atraumatic.      Right Ear: External ear normal.      Left Ear: External ear normal.      Nose: Rhinorrhea present.   Eyes:      Extraocular Movements: Extraocular movements intact.      Conjunctiva/sclera: Conjunctivae normal.   Neck:      Trachea: Trachea normal.   Cardiovascular:      Rate and Rhythm: Normal rate and regular rhythm.      Heart sounds: Normal heart sounds.   Pulmonary:      Effort: Pulmonary effort is normal. No respiratory distress.      Breath sounds: Wheezing present.   Abdominal:      General: Abdomen is flat.   Musculoskeletal:      " Cervical back: Neck supple.      Comments: Moves all limbs   Skin:     General: Skin is warm.   Neurological:      Mental Status: She is alert and oriented to person, place, and time.      Comments: No gross motor or sensory deficits        Result Review :  The following data was reviewed by: Uma Anne MD on 10/30/2023:  CMP          7/2/2023    21:52   CMP   Glucose 91    BUN 16    Creatinine 0.54    EGFR 108.2    Sodium 144    Potassium 4.2    Chloride 105    Calcium 9.6    Total Protein 7.5    Albumin 4.9    Globulin 2.6    Total Bilirubin 0.2    Alkaline Phosphatase 86    AST (SGOT) 21    ALT (SGPT) 16    Albumin/Globulin Ratio 1.9    BUN/Creatinine Ratio 29.6    Anion Gap 15.2      CBC          7/2/2023    21:52   CBC   WBC 5.68    RBC 3.95    Hemoglobin 12.2    Hematocrit 36.9    MCV 93.4    MCH 30.9    MCHC 33.1    RDW 13.2    Platelets 257                   Assessment and Plan   Diagnoses and all orders for this visit:    1. Elevated blood pressure reading without diagnosis of hypertension (Primary)    2. Mild intermittent asthma with acute exacerbation  -     albuterol sulfate  (90 Base) MCG/ACT inhaler; Inhale 1 puff Every 6 (Six) Hours As Needed for Wheezing.  Dispense: 18 g; Refill: 3  -     montelukast (Singulair) 10 MG tablet; Take 1 tablet by mouth Every Night.  Dispense: 90 tablet; Refill: 1    3. Anxiety  -     FLUoxetine (PROzac) 40 MG capsule; Take 1 capsule by mouth Daily.  Dispense: 90 capsule; Refill: 1  -     ALPRAZolam (XANAX) 1 MG tablet; Take 1 tablet by mouth 2 (Two) Times a Day As Needed for Anxiety. TAKE 1 TABLET BY MOUTH AT NIGHT AS NEEDED FOR SLEEP  Dispense: 60 tablet; Refill: 3    4. B12 deficiency  -     cyanocobalamin injection 1,000 mcg    5. Chronic maxillary sinusitis  -     Ambulatory Referral to ENT (Otolaryngology)    6. Insomnia, psychophysiological  -     ALPRAZolam (XANAX) 1 MG tablet; Take 1 tablet by mouth 2 (Two) Times a Day As Needed for Anxiety. TAKE 1  TABLET BY MOUTH AT NIGHT AS NEEDED FOR SLEEP  Dispense: 60 tablet; Refill: 3    Plan:  1.  Elevated blood pressure without diagnosis of   hypertension: Will continue  low-sodium diet advised, Counseled to regularly check BP at home with goal averaging <130/80.   2.mild intermittent asthma with acute exacerbation: We will start Singulair, and Ventolin  3.  Anxiety disorder: We will increase to Prozac 40 mg daily and as needed alprazolam   4.chronic maxillary sinusitis:   Refer to ENT  5.  Major deficiency: B12 IM given in office today and well-tolerated  6.  Insomnia: As needed alprazolam, SANCHEZ and David reviewed and obtained         Follow Up   Return in about 1 month (around 11/30/2023).  Patient was given instructions and counseling regarding her condition or for health maintenance advice. Please see specific information pulled into the AVS if appropriate.

## 2023-11-02 RX ORDER — ALPRAZOLAM 1 MG/1
1 TABLET ORAL 2 TIMES DAILY PRN
Qty: 60 TABLET | Refills: 3 | Status: SHIPPED | OUTPATIENT
Start: 2023-11-02

## 2023-11-09 DIAGNOSIS — Z00.00 ROUTINE GENERAL MEDICAL EXAMINATION AT A HEALTH CARE FACILITY: Primary | ICD-10-CM

## 2023-11-14 ENCOUNTER — TELEPHONE (OUTPATIENT)
Dept: INTERNAL MEDICINE | Facility: CLINIC | Age: 58
End: 2023-11-14
Payer: OTHER GOVERNMENT

## 2023-11-14 DIAGNOSIS — F51.04 INSOMNIA, PSYCHOPHYSIOLOGICAL: ICD-10-CM

## 2023-11-14 DIAGNOSIS — F41.9 ANXIETY: ICD-10-CM

## 2023-11-14 NOTE — TELEPHONE ENCOUNTER
Caller: Ernestina Medina    Relationship to patient: Self    Best call back number: [phone]     PATIENT IS CALLING TO STATE THAT DR HUMPHREYS CALLED IN [facility] FOR HER, BUT THE DIRECTIONS WERE NOT CLEAR AND THE PHARMACY HAS SENT A FAX REQUEST, BUT HAVE NOT RECEIVED A RESPONSE.

## 2023-11-15 RX ORDER — ALPRAZOLAM 1 MG/1
1 TABLET ORAL 2 TIMES DAILY PRN
Qty: 60 TABLET | Refills: 3 | Status: SHIPPED | OUTPATIENT
Start: 2023-11-15

## 2023-11-28 LAB
ALBUMIN SERPL-MCNC: 5.1 G/DL (ref 3.5–5.2)
ALBUMIN/GLOB SERPL: 2.2 G/DL
ALP SERPL-CCNC: 83 U/L (ref 39–117)
ALT SERPL-CCNC: 21 U/L (ref 1–33)
AST SERPL-CCNC: 25 U/L (ref 1–32)
BASOPHILS # BLD AUTO: 0.02 10*3/MM3 (ref 0–0.2)
BASOPHILS NFR BLD AUTO: 0.5 % (ref 0–1.5)
BILIRUB SERPL-MCNC: 0.5 MG/DL (ref 0–1.2)
BUN SERPL-MCNC: 18 MG/DL (ref 6–20)
BUN/CREAT SERPL: 31 (ref 7–25)
CALCIUM SERPL-MCNC: 9.6 MG/DL (ref 8.6–10.5)
CHLORIDE SERPL-SCNC: 102 MMOL/L (ref 98–107)
CHOLEST SERPL-MCNC: 259 MG/DL (ref 0–200)
CO2 SERPL-SCNC: 25.8 MMOL/L (ref 22–29)
CREAT SERPL-MCNC: 0.58 MG/DL (ref 0.57–1)
EGFRCR SERPLBLD CKD-EPI 2021: 106.4 ML/MIN/1.73
EOSINOPHIL # BLD AUTO: 0.12 10*3/MM3 (ref 0–0.4)
EOSINOPHIL NFR BLD AUTO: 2.8 % (ref 0.3–6.2)
ERYTHROCYTE [DISTWIDTH] IN BLOOD BY AUTOMATED COUNT: 11.8 % (ref 12.3–15.4)
GLOBULIN SER CALC-MCNC: 2.3 GM/DL
GLUCOSE SERPL-MCNC: 90 MG/DL (ref 65–99)
HCT VFR BLD AUTO: 40.8 % (ref 34–46.6)
HDLC SERPL-MCNC: 110 MG/DL (ref 40–60)
HGB BLD-MCNC: 13.6 G/DL (ref 12–15.9)
IMM GRANULOCYTES # BLD AUTO: 0.01 10*3/MM3 (ref 0–0.05)
IMM GRANULOCYTES NFR BLD AUTO: 0.2 % (ref 0–0.5)
LDLC SERPL CALC-MCNC: 141 MG/DL (ref 0–100)
LYMPHOCYTES # BLD AUTO: 1.44 10*3/MM3 (ref 0.7–3.1)
LYMPHOCYTES NFR BLD AUTO: 33.3 % (ref 19.6–45.3)
MCH RBC QN AUTO: 31.5 PG (ref 26.6–33)
MCHC RBC AUTO-ENTMCNC: 33.3 G/DL (ref 31.5–35.7)
MCV RBC AUTO: 94.4 FL (ref 79–97)
MONOCYTES # BLD AUTO: 0.28 10*3/MM3 (ref 0.1–0.9)
MONOCYTES NFR BLD AUTO: 6.5 % (ref 5–12)
NEUTROPHILS # BLD AUTO: 2.46 10*3/MM3 (ref 1.7–7)
NEUTROPHILS NFR BLD AUTO: 56.7 % (ref 42.7–76)
NRBC BLD AUTO-RTO: 0 /100 WBC (ref 0–0.2)
PLATELET # BLD AUTO: 277 10*3/MM3 (ref 140–450)
POTASSIUM SERPL-SCNC: 4.5 MMOL/L (ref 3.5–5.2)
PROT SERPL-MCNC: 7.4 G/DL (ref 6–8.5)
RBC # BLD AUTO: 4.32 10*6/MM3 (ref 3.77–5.28)
SODIUM SERPL-SCNC: 137 MMOL/L (ref 136–145)
TRIGL SERPL-MCNC: 54 MG/DL (ref 0–150)
TSH SERPL DL<=0.005 MIU/L-ACNC: 1.85 UIU/ML (ref 0.27–4.2)
VIT B12 SERPL-MCNC: 592 PG/ML (ref 211–946)
VLDLC SERPL CALC-MCNC: 8 MG/DL (ref 5–40)
WBC # BLD AUTO: 4.33 10*3/MM3 (ref 3.4–10.8)

## 2023-11-29 ENCOUNTER — OFFICE VISIT (OUTPATIENT)
Dept: INTERNAL MEDICINE | Facility: CLINIC | Age: 58
End: 2023-11-29
Payer: OTHER GOVERNMENT

## 2023-11-29 VITALS
OXYGEN SATURATION: 100 % | HEART RATE: 68 BPM | RESPIRATION RATE: 16 BRPM | BODY MASS INDEX: 24.41 KG/M2 | TEMPERATURE: 97.5 F | SYSTOLIC BLOOD PRESSURE: 144 MMHG | HEIGHT: 64 IN | DIASTOLIC BLOOD PRESSURE: 89 MMHG | WEIGHT: 143 LBS

## 2023-11-29 DIAGNOSIS — I10 BENIGN ESSENTIAL HYPERTENSION: Primary | ICD-10-CM

## 2023-11-29 DIAGNOSIS — E78.2 MIXED HYPERLIPIDEMIA: ICD-10-CM

## 2023-11-29 DIAGNOSIS — J45.21 MILD INTERMITTENT ASTHMA WITH ACUTE EXACERBATION: ICD-10-CM

## 2023-11-29 PROCEDURE — 99214 OFFICE O/P EST MOD 30 MIN: CPT | Performed by: INTERNAL MEDICINE

## 2023-11-29 RX ORDER — AMLODIPINE BESYLATE 5 MG/1
5 TABLET ORAL DAILY
Qty: 30 TABLET | Refills: 5 | Status: SHIPPED | OUTPATIENT
Start: 2023-11-29

## 2023-11-29 NOTE — PROGRESS NOTES
"Chief Complaint  Hypertension and Hyperlipidemia    Subjective        Ernestina Medina presents to [facility]  HPI: Patient is here to follow up on the blood pressure   which is noted to be elevated , she is very stressed out due to her 's illness, the patient is also here to follow up on the cholesterol and had lab work done .  She also complains of asthma, the patient also needs refills on medications .   Hyperlipidemia   Pertinent negatives include no chest pain or shortness of breath.   Hypertension   Pertinent negatives include no chest pain, palpitations or shortness of breath.    Hypertension        Objective   Vital Signs:  /89   Pulse 68   Temp 97.5 °F (36.4 °C)   Resp 16   Ht 162.6 cm (64.02\")   Wt 64.9 kg (143 lb)   SpO2 100%   BMI 24.53 kg/m²   Estimated body mass index is 24.53 kg/m² as calculated from the following:    Height as of this encounter: 162.6 cm (64.02\").    Weight as of this encounter: 64.9 kg (143 lb).       BMI is within normal parameters. No other follow-up for BMI required.      Physical Exam  Vitals and nursing note reviewed.   Constitutional:       General: She is not in acute distress.     Appearance: Normal appearance. She is not diaphoretic.   HENT:      Head: Normocephalic and atraumatic.      Right Ear: External ear normal.      Left Ear: External ear normal.      Nose: Nose normal.   Eyes:      Extraocular Movements: Extraocular movements intact.      Conjunctiva/sclera: Conjunctivae normal.   Neck:      Trachea: Trachea normal.   Cardiovascular:      Rate and Rhythm: Normal rate and regular rhythm.      Heart sounds: Normal heart sounds.   Pulmonary:      Effort: Pulmonary effort is normal. No respiratory distress.      Breath sounds: Normal breath sounds.   Abdominal:      General: Abdomen is flat.   Musculoskeletal:      Cervical back: Neck supple.      Comments: Moves all limbs   Skin:     General: Skin is warm. "   Neurological:      Mental Status: She is alert and oriented to person, place, and time.      Comments: No gross motor or sensory deficits        Result Review :  The following data was reviewed by: Uma Anne MD on 11/29/2023:  Common labs          7/2/2023    21:52 11/27/2023    11:34   Common Labs   Glucose 91  90    BUN 16  18    Creatinine 0.54  0.58    Sodium 144  137    Potassium 4.2  4.5    Chloride 105  102    Calcium 9.6  9.6    Total Protein  7.4    Albumin 4.9  5.1    Total Bilirubin 0.2  0.5    Alkaline Phosphatase 86  83    AST (SGOT) 21  25    ALT (SGPT) 16  21    WBC 5.68  4.33    Hemoglobin 12.2  13.6    Hematocrit 36.9  40.8    Platelets 257  277    Total Cholesterol  259    Triglycerides  54    HDL Cholesterol  110    LDL Cholesterol   141                   Assessment and Plan   Diagnoses and all orders for this visit:    1. Benign essential hypertension (Primary)  -     amLODIPine (Norvasc) 5 MG tablet; Take 1 tablet by mouth Daily.  Dispense: 30 tablet; Refill: 5    2. Mixed hyperlipidemia  -     CBC & Differential  -     Comprehensive Metabolic Panel  -     Lipid Panel    3. Mild intermittent asthma with acute exacerbation  -     Ambulatory Referral to Pulmonology    Plan:  1.  Benign essential hypertension: Will start Norvasc 5 mg p.o. nightly, low-sodium diet advised, Counseled to regularly check BP at home with goal averaging <130/80.   2.mixed hyperlipidemia: Reviewed fasting CMP and lipid panel.  Diet and exercise counseled,   [id number] , elevated HDL  3.  Asthma: Continue inhalers, will refer patient to pulmonary         Follow Up   Return in about 3 months (around 2/29/2024).  Patient was given instructions and counseling regarding her condition or for health maintenance advice. Please see specific information pulled into the AVS if appropriate.

## 2023-12-22 ENCOUNTER — CLINICAL SUPPORT (OUTPATIENT)
Dept: INTERNAL MEDICINE | Facility: CLINIC | Age: 58
End: 2023-12-22
Payer: OTHER GOVERNMENT

## 2023-12-22 DIAGNOSIS — D51.8 OTHER VITAMIN B12 DEFICIENCY ANEMIA: Primary | ICD-10-CM

## 2023-12-22 PROCEDURE — 96372 THER/PROPH/DIAG INJ SC/IM: CPT | Performed by: INTERNAL MEDICINE

## 2023-12-22 RX ADMIN — CYANOCOBALAMIN 1000 MCG: 1000 INJECTION, SOLUTION INTRAMUSCULAR; SUBCUTANEOUS at 14:26

## 2024-01-23 ENCOUNTER — CLINICAL SUPPORT (OUTPATIENT)
Dept: INTERNAL MEDICINE | Facility: CLINIC | Age: 59
End: 2024-01-23
Payer: OTHER GOVERNMENT

## 2024-01-23 DIAGNOSIS — E53.8 COBALAMIN DEFICIENCY: Primary | ICD-10-CM

## 2024-01-23 PROCEDURE — 96372 THER/PROPH/DIAG INJ SC/IM: CPT | Performed by: INTERNAL MEDICINE

## 2024-01-23 RX ADMIN — CYANOCOBALAMIN 1000 MCG: 1000 INJECTION, SOLUTION INTRAMUSCULAR; SUBCUTANEOUS at 14:30

## 2024-02-29 ENCOUNTER — OFFICE VISIT (OUTPATIENT)
Dept: INTERNAL MEDICINE | Facility: CLINIC | Age: 59
End: 2024-02-29
Payer: OTHER GOVERNMENT

## 2024-02-29 VITALS
RESPIRATION RATE: 18 BRPM | HEIGHT: 64 IN | OXYGEN SATURATION: 99 % | HEART RATE: 74 BPM | WEIGHT: 144 LBS | SYSTOLIC BLOOD PRESSURE: 127 MMHG | BODY MASS INDEX: 24.59 KG/M2 | TEMPERATURE: 98 F | DIASTOLIC BLOOD PRESSURE: 82 MMHG

## 2024-02-29 DIAGNOSIS — F51.04 INSOMNIA, PSYCHOPHYSIOLOGICAL: ICD-10-CM

## 2024-02-29 DIAGNOSIS — Z51.81 THERAPEUTIC DRUG MONITORING: ICD-10-CM

## 2024-02-29 DIAGNOSIS — F41.9 ANXIETY: ICD-10-CM

## 2024-02-29 DIAGNOSIS — I10 BENIGN ESSENTIAL HYPERTENSION: Primary | ICD-10-CM

## 2024-02-29 DIAGNOSIS — D51.0 PERNICIOUS ANEMIA: ICD-10-CM

## 2024-02-29 RX ORDER — FLUOXETINE HYDROCHLORIDE 40 MG/1
40 CAPSULE ORAL DAILY
Qty: 90 CAPSULE | Refills: 1 | Status: SHIPPED | OUTPATIENT
Start: 2024-02-29

## 2024-02-29 RX ORDER — CYANOCOBALAMIN 1000 UG/ML
1000 INJECTION, SOLUTION INTRAMUSCULAR; SUBCUTANEOUS
Status: SHIPPED | OUTPATIENT
Start: 2024-02-29

## 2024-02-29 RX ORDER — ALPRAZOLAM 1 MG/1
1 TABLET ORAL 2 TIMES DAILY PRN
Qty: 60 TABLET | Refills: 4 | Status: SHIPPED | OUTPATIENT
Start: 2024-02-29

## 2024-02-29 RX ORDER — AMLODIPINE BESYLATE 5 MG/1
5 TABLET ORAL DAILY
Qty: 90 TABLET | Refills: 1 | Status: SHIPPED | OUTPATIENT
Start: 2024-02-29

## 2024-02-29 RX ADMIN — CYANOCOBALAMIN 1000 MCG: 1000 INJECTION, SOLUTION INTRAMUSCULAR; SUBCUTANEOUS at 15:04

## 2024-02-29 NOTE — PROGRESS NOTES
"Chief Complaint  Hypertension, Insomnia, and Anxiety    Subjective        Ernestina Medina presents to [facility]  HPI: Patient is here to follow up on the blood pressure  The patient is taking the blood pressure medications as prescribed and has had no side effects. 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        Objective   Vital Signs:  /82   Pulse 74   Temp 98 °F (36.7 °C)   Resp 18   Ht 162.6 cm (64.02\")   Wt 65.3 kg (144 lb)   SpO2 99%   BMI 24.71 kg/m²   Estimated body mass index is 24.71 kg/m² as calculated from the following:    Height as of this encounter: 162.6 cm (64.02\").    Weight as of this encounter: 65.3 kg (144 lb).       BMI is within normal parameters. No other follow-up for BMI required.      Physical Exam  Vitals and nursing note reviewed.   Constitutional:       General: She is not in acute distress.     Appearance: Normal appearance. She is not diaphoretic.   HENT:      Head: Normocephalic and atraumatic.      Right Ear: External ear normal.      Left Ear: External ear normal.      Nose: Nose normal.   Eyes:      Extraocular Movements: Extraocular movements intact.      Conjunctiva/sclera: Conjunctivae normal.   Neck:      Trachea: Trachea normal.   Cardiovascular:      Rate and Rhythm: Normal rate and regular rhythm.      Heart sounds: Normal heart sounds.   Pulmonary:      Effort: Pulmonary effort is normal. No respiratory distress.      Breath sounds: Normal " breath sounds.   Abdominal:      General: Abdomen is flat.   Musculoskeletal:      Cervical back: Neck supple.      Comments: Moves all limbs   Skin:     General: Skin is warm.   Neurological:      Mental Status: She is alert and oriented to person, place, and time.      Comments: No gross motor or sensory deficits        Result Review :    The following data was reviewed by: Uma Anne MD on 02/29/2024:  Common labs          7/2/2023    21:52 11/27/2023    11:34   Common Labs   Glucose 91  90    BUN 16  18    Creatinine 0.54  0.58    Sodium 144  137    Potassium 4.2  4.5    Chloride 105  102    Calcium 9.6  9.6    Total Protein  7.4    Albumin 4.9  5.1    Total Bilirubin 0.2  0.5    Alkaline Phosphatase 86  83    AST (SGOT) 21  25    ALT (SGPT) 16  21    WBC 5.68  4.33    Hemoglobin 12.2  13.6    Hematocrit 36.9  40.8    Platelets 257  277    Total Cholesterol  259    Triglycerides  54    HDL Cholesterol  110    LDL Cholesterol   141                   Assessment and Plan     Diagnoses and all orders for this visit:    1. Benign essential hypertension (Primary)  -     amLODIPine (Norvasc) 5 MG tablet; Take 1 tablet by mouth Daily.  Dispense: 90 tablet; Refill: 1  -     CBC (No Diff)  -     Comprehensive Metabolic Panel    2. Anxiety  -     FLUoxetine (PROzac) 40 MG capsule; Take 1 capsule by mouth Daily.  Dispense: 90 capsule; Refill: 1  -     Compliance Drug Analysis, Ur - Urine, Clean Catch  -     ALPRAZolam (XANAX) 1 MG tablet; Take 1 tablet by mouth 2 (Two) Times a Day As Needed for Anxiety.  Dispense: 60 tablet; Refill: 4    3. Insomnia, psychophysiological  -     ALPRAZolam (XANAX) 1 MG tablet; Take 1 tablet by mouth 2 (Two) Times a Day As Needed for Anxiety.  Dispense: 60 tablet; Refill: 4    4. B12 deficiency  -     cyanocobalamin injection 1,000 mcg  -     Vitamin B12    5. Therapeutic drug monitoring  -     Compliance Drug Analysis, Ur - Urine, Clean Catch    Plan:  1.  Benign essential hypertension:  Will continue current medication, low-sodium diet advised, Counseled to regularly check BP at home with goal averaging <130/80.   2. Anxiety : refill prozac , uds and mony reviewed and obtained and refill prn alprazolam  3. Insomnia: uds and mony reviewed and obtained and refill prn alprazolam  4. pernicious anemia: b12 Im given today  The patient has read and signed the [facility] Controlled Substance Contract.The patient is aware of the potential for addiction and dependence. This is a short-term use   A Mony was reviewed  in the chart today. Narcotic contract was signed by patient today . Will obtain Urine drug screen  Mony Report   As part of this patient's treatment plan I am prescribing controlled substances. The patient has been made aware of appropriate use of such medications, including potential risk of somnolence, limited ability to drive and /or work safely, and potential for dependence or overdose. It has also been made clear that these medications are for use by this patient only, without concomitant use of alcohol or other substances unless prescribed.   Patient has completed prescribing agreement detailing terms of continued prescribing of controlled substances, including monitoring MONY reports, urine drug screening, and pill counts if necessary. The patient is aware that inappropriate use will result in cessation of prescribing such medications.   MONY report has been reviewed   History and physical exam exhibit continued safe and appropriate use of controlled substances.    The patient has been instructed to contact my office with any questions or difficulties. The patient understands the plan , patient has verbalized an understanding and agrees to proceed accordingly         Follow Up     Return in about 5 months (around 7/15/2024).  Patient was given instructions and counseling regarding her condition or for health maintenance advice. Please see specific information pulled into  the AVS if appropriate.

## 2024-03-08 LAB — DRUGS UR: NORMAL

## 2024-04-30 DIAGNOSIS — F51.04 INSOMNIA, PSYCHOPHYSIOLOGICAL: ICD-10-CM

## 2024-04-30 DIAGNOSIS — F41.9 ANXIETY: ICD-10-CM

## 2024-05-01 RX ORDER — ALPRAZOLAM 1 MG/1
1 TABLET ORAL 2 TIMES DAILY PRN
Qty: 60 TABLET | Refills: 3 | Status: SHIPPED | OUTPATIENT
Start: 2024-05-01

## 2024-05-01 RX ORDER — ALPRAZOLAM 1 MG/1
1 TABLET ORAL 2 TIMES DAILY PRN
Qty: 60 TABLET | Refills: 4 | OUTPATIENT
Start: 2024-05-01

## 2024-05-01 NOTE — TELEPHONE ENCOUNTER
Caller: Ernestina Medina    Relationship: Self    Best call back number: [phone]     Requested Prescriptions:   Requested Prescriptions     Pending Prescriptions Disp Refills    ALPRAZolam (XANAX) 1 MG tablet 60 tablet 4     Sig: Take 1 tablet by mouth 2 (Two) Times a Day As Needed for Anxiety.        Pharmacy where request should be sent: [id number] - [place], [place] - [facility] [address] [facility] [address] - [phone]  - [phone] FX     Last office visit with prescribing clinician: 2/29/2024   Last telemedicine visit with prescribing clinician: Visit date not found   Next office visit with prescribing clinician: 7/15/2024     Additional details provided by patient: PLEASE SEND REFILLS. PATIENT OUT OF MEDICATION. PHARMACY HAS NOT RECEIVED YET.    Does the patient have less than a 3 day supply:  [x] Yes  [] No    Would you like a call back once the refill request has been completed: [] Yes [x] No    If the office needs to give you a call back, can they leave a voicemail: [] Yes [x] No    Giovany Gamino Rep   05/01/24 14:34 EDT

## 2024-05-01 NOTE — TELEPHONE ENCOUNTER
Rx Refill Note  Requested Prescriptions     Pending Prescriptions Disp Refills    ALPRAZolam (XANAX) 1 MG tablet 60 tablet 4     Sig: Take 1 tablet by mouth 2 (Two) Times a Day As Needed for Anxiety.      Last office visit with prescribing clinician: 2/29/2024   Last telemedicine visit with prescribing clinician: Visit date not found   Next office visit with prescribing clinician: 7/15/2024                         Would you like a call back once the refill request has been completed: [] Yes [] No    If the office needs to give you a call back, can they leave a voicemail: [] Yes [] No    Ant Sarmiento, [facility]  05/01/24, 16:43 EDT

## 2024-05-15 ENCOUNTER — OFFICE VISIT (OUTPATIENT)
Dept: INTERNAL MEDICINE | Facility: CLINIC | Age: 59
End: 2024-05-15
Payer: OTHER GOVERNMENT

## 2024-05-15 VITALS
BODY MASS INDEX: 21.68 KG/M2 | DIASTOLIC BLOOD PRESSURE: 78 MMHG | WEIGHT: 127 LBS | SYSTOLIC BLOOD PRESSURE: 119 MMHG | OXYGEN SATURATION: 100 % | RESPIRATION RATE: 18 BRPM | HEART RATE: 67 BPM | HEIGHT: 64 IN | TEMPERATURE: 97.5 F

## 2024-05-15 DIAGNOSIS — I10 BENIGN ESSENTIAL HYPERTENSION: Primary | ICD-10-CM

## 2024-05-15 DIAGNOSIS — R11.0 NAUSEA: ICD-10-CM

## 2024-05-15 DIAGNOSIS — F33.1 MODERATE EPISODE OF RECURRENT MAJOR DEPRESSIVE DISORDER: ICD-10-CM

## 2024-05-15 DIAGNOSIS — R00.2 PALPITATIONS: ICD-10-CM

## 2024-05-15 DIAGNOSIS — K21.00 GASTROESOPHAGEAL REFLUX DISEASE WITH ESOPHAGITIS WITHOUT HEMORRHAGE: ICD-10-CM

## 2024-05-15 DIAGNOSIS — Z63.4 BEREAVEMENT: ICD-10-CM

## 2024-05-15 DIAGNOSIS — F41.9 ANXIETY: ICD-10-CM

## 2024-05-15 DIAGNOSIS — D51.0 PERNICIOUS ANEMIA: ICD-10-CM

## 2024-05-15 DIAGNOSIS — R63.4 WEIGHT LOSS: ICD-10-CM

## 2024-05-15 LAB
ALBUMIN SERPL-MCNC: 4.8 G/DL (ref 3.5–5.2)
ALBUMIN/GLOB SERPL: 2.2 G/DL
ALP SERPL-CCNC: 80 U/L (ref 39–117)
ALT SERPL-CCNC: 26 U/L (ref 1–33)
AST SERPL-CCNC: 27 U/L (ref 1–32)
BASOPHILS # BLD AUTO: 0.03 10*3/MM3 (ref 0–0.2)
BASOPHILS NFR BLD AUTO: 0.8 % (ref 0–1.5)
BILIRUB SERPL-MCNC: 0.4 MG/DL (ref 0–1.2)
BUN SERPL-MCNC: 16 MG/DL (ref 6–20)
BUN/CREAT SERPL: 23.2 (ref 7–25)
CALCIUM SERPL-MCNC: 9.9 MG/DL (ref 8.6–10.5)
CHLORIDE SERPL-SCNC: 104 MMOL/L (ref 98–107)
CO2 SERPL-SCNC: 24.1 MMOL/L (ref 22–29)
CREAT SERPL-MCNC: 0.69 MG/DL (ref 0.57–1)
EGFRCR SERPLBLD CKD-EPI 2021: 101.4 ML/MIN/1.73
EOSINOPHIL # BLD AUTO: 0.13 10*3/MM3 (ref 0–0.4)
EOSINOPHIL NFR BLD AUTO: 3.3 % (ref 0.3–6.2)
ERYTHROCYTE [DISTWIDTH] IN BLOOD BY AUTOMATED COUNT: 11.7 % (ref 12.3–15.4)
GLOBULIN SER CALC-MCNC: 2.2 GM/DL
GLUCOSE SERPL-MCNC: 89 MG/DL (ref 65–99)
HCT VFR BLD AUTO: 41.2 % (ref 34–46.6)
HGB BLD-MCNC: 13.8 G/DL (ref 12–15.9)
IMM GRANULOCYTES # BLD AUTO: 0.02 10*3/MM3 (ref 0–0.05)
IMM GRANULOCYTES NFR BLD AUTO: 0.5 % (ref 0–0.5)
LYMPHOCYTES # BLD AUTO: 1.1 10*3/MM3 (ref 0.7–3.1)
LYMPHOCYTES NFR BLD AUTO: 27.5 % (ref 19.6–45.3)
MCH RBC QN AUTO: 31.7 PG (ref 26.6–33)
MCHC RBC AUTO-ENTMCNC: 33.5 G/DL (ref 31.5–35.7)
MCV RBC AUTO: 94.7 FL (ref 79–97)
MONOCYTES # BLD AUTO: 0.3 10*3/MM3 (ref 0.1–0.9)
MONOCYTES NFR BLD AUTO: 7.5 % (ref 5–12)
NEUTROPHILS # BLD AUTO: 2.42 10*3/MM3 (ref 1.7–7)
NEUTROPHILS NFR BLD AUTO: 60.4 % (ref 42.7–76)
NRBC BLD AUTO-RTO: 0 /100 WBC (ref 0–0.2)
PLATELET # BLD AUTO: 257 10*3/MM3 (ref 140–450)
POTASSIUM SERPL-SCNC: 4.5 MMOL/L (ref 3.5–5.2)
PROT SERPL-MCNC: 7 G/DL (ref 6–8.5)
RBC # BLD AUTO: 4.35 10*6/MM3 (ref 3.77–5.28)
SODIUM SERPL-SCNC: 140 MMOL/L (ref 136–145)
TSH SERPL DL<=0.005 MIU/L-ACNC: 2.14 UIU/ML (ref 0.27–4.2)
VIT B12 SERPL-MCNC: 597 PG/ML (ref 211–946)
WBC # BLD AUTO: 4 10*3/MM3 (ref 3.4–10.8)

## 2024-05-15 PROCEDURE — 99214 OFFICE O/P EST MOD 30 MIN: CPT | Performed by: INTERNAL MEDICINE

## 2024-05-15 RX ORDER — MIRTAZAPINE 15 MG/1
15 TABLET, FILM COATED ORAL NIGHTLY
Qty: 30 TABLET | Refills: 4 | Status: SHIPPED | OUTPATIENT
Start: 2024-05-15

## 2024-05-15 RX ORDER — PANTOPRAZOLE SODIUM 40 MG/1
40 TABLET, DELAYED RELEASE ORAL DAILY
Qty: 30 TABLET | Refills: 4 | Status: SHIPPED | OUTPATIENT
Start: 2024-05-15

## 2024-05-15 RX ORDER — ONDANSETRON 4 MG/1
4 TABLET, FILM COATED ORAL EVERY 8 HOURS PRN
Qty: 30 TABLET | Refills: 1 | Status: SHIPPED | OUTPATIENT
Start: 2024-05-15

## 2024-05-15 SDOH — SOCIAL STABILITY - SOCIAL INSECURITY: DISSAPEARANCE AND DEATH OF FAMILY MEMBER: Z63.4

## 2024-05-15 NOTE — PATIENT INSTRUCTIONS
MyPlate from USDA    MyPlate is an outline of a general healthy diet based on the Dietary Guidelines for Americans, 8703-7977, from the U.S. Department of Agriculture (USDA). It sets guidelines for how much food you should eat from each food group based on your age, sex, and level of physical activity.  What are tips for following MyPlate?  To follow MyPlate recommendations:  Eat a wide variety of fruits and vegetables, grains, and protein foods.  Serve smaller portions and eat less food throughout the day.  Limit portion sizes to avoid overeating.  Enjoy your food.  Get at least 150 minutes of exercise every week. This is about 30 minutes each day, 5 or more days per week.  It can be difficult to have every meal look like MyPlate. Think about MyPlate as eating guidelines for an entire day, rather than each individual meal.  Fruits and vegetables  Make one half of your plate fruits and vegetables.  Eat many different colors of fruits and vegetables each day.  For a 2,000-calorie daily food plan, eat:  2½ cups of vegetables every day.  2 cups of fruit every day.  1 cup is equal to:  1 cup raw or cooked vegetables.  1 cup raw fruit.  1 medium-sized orange, apple, or banana.  1 cup 100% fruit or vegetable juice.  2 cups raw leafy greens, such as lettuce, spinach, or kale.  ½ cup dried fruit.  Grains  One fourth of your plate should be grains.  Make at least half of the grains you eat each day whole grains.  For a 2,000-calorie daily food plan, eat 6 oz of grains every day.  1 oz is equal to:  1 slice bread.  1 cup cereal.  ½ cup cooked rice, cereal, or pasta.  Protein  One fourth of your plate should be protein.  Eat a wide variety of protein foods, including meat, poultry, fish, eggs, beans, nuts, and tofu.  For a 2,000-calorie daily food plan, eat 5½ oz of protein every day.  1 oz is equal to:  1 oz meat, poultry, or fish.  ¼ cup cooked beans.  1 egg.  ½ oz nuts or seeds.  1 Tbsp peanut butter.  Dairy  Drink fat-free  or low-fat (1%) milk.  Eat or drink dairy as a side to meals.  For a 2,000-calorie daily food plan, eat or drink 3 cups of dairy every day.  1 cup is equal to:  1 cup milk, yogurt, cottage cheese, or soy milk (soy beverage).  2 oz processed cheese.  1½ oz natural cheese.  Fats, oils, salt, and sugars  Only small amounts of oils are recommended.  Avoid foods that are high in calories and low in nutritional value (empty calories), like foods high in fat or added sugars.  Choose foods that are low in salt (sodium). Choose foods that have less than 140 milligrams (mg) of sodium per serving.  Drink water instead of sugary drinks. Drink enough fluid to keep your urine pale yellow.  Where to find support  Work with your health care provider or a dietitian to develop a customized eating plan that is right for you.  Download an joseluis (mobile application) to help you track your daily food intake.  Where to find more information  USDA: ChooseMyPlate.gov  Summary  MyPlate is a general guideline for healthy eating from the USDA. It is based on the Dietary Guidelines for Americans, 2734-4533.  In general, fruits and vegetables should take up one half of your plate, grains should take up one fourth of your plate, and protein should take up one fourth of your plate.  This information is not intended to replace advice given to you by your health care provider. Make sure you discuss any questions you have with your health care provider.  Document Revised: 11/08/2021 Document Reviewed: 11/08/2021  [employer] Patient Education © 2024 [employer]

## 2024-05-15 NOTE — PROGRESS NOTES
"Chief Complaint  Anxiety ( passed 5/5/2024, not sleeping, eating exhausted, trouble thinking concentrating) and Depression    Subjective        Ernestina Mckinneylisamoisesesteban presents to [facility]  Patient is here to follow-up on her blood pressure and is taking her medications as prescribed, she complains of palpitations which have been ongoing, patient states her  passed away with home hospice on May 5, 2024, she states she is really anxious and depressed and is grieving, she complains that she has not been able to eat has been very tired and has lost nearly 20 pounds, she states it was stress for the last 18 months and he has been worsening, she also homeschools 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    Weight as of this encounter: 57.6 kg (127 lb).       BMI is within normal parameters. No other follow-up for BMI required.      Physical Exam  Vitals and nursing note reviewed.   Constitutional:       General: She is not in acute distress.     Appearance: Normal appearance. She is not diaphoretic.   HENT:      Head: Normocephalic and atraumatic.      Right Ear: External ear normal.      Left Ear: External ear normal.      Nose: Nose normal.   Eyes:      Extraocular Movements: Extraocular movements intact.      Conjunctiva/sclera: Conjunctivae normal.   Neck:      Trachea: Trachea normal.   Cardiovascular:      Rate and Rhythm: Normal rate and regular rhythm.      Heart sounds: Normal heart sounds.   Pulmonary:      Effort: Pulmonary effort is normal. No respiratory distress.      Breath sounds: Normal breath sounds.   Abdominal:      General: Abdomen is flat.   Musculoskeletal: "      Cervical back: Neck supple.      Comments: Moves all limbs   Skin:     General: Skin is warm.   Neurological:      Mental Status: She is alert and oriented to person, place, and time.      Comments: No gross motor or sensory deficits        Result Review :    The following data was reviewed by: Uma Anne MD on 05/15/2024:  CMP          7/2/2023    21:52 11/27/2023    11:34   CMP   Glucose 91  90    BUN 16  18    Creatinine 0.54  0.58    EGFR 108.2     Sodium 144  137    Potassium 4.2  4.5    Chloride 105  102    Calcium 9.6  9.6    Total Protein  7.4    Total Protein 7.5     Albumin 4.9  5.1    Globulin  2.3    Globulin 2.6     Total Bilirubin 0.2  0.5    Alkaline Phosphatase 86  83    AST (SGOT) 21  25    ALT (SGPT) 16  21    Albumin/Globulin Ratio 1.9     BUN/Creatinine Ratio 29.6  31.0    Anion Gap 15.2       CBC          7/2/2023    21:52 11/27/2023    11:34   CBC   WBC 5.68  4.33    RBC 3.95  4.32    Hemoglobin 12.2  13.6    Hematocrit 36.9  40.8    MCV 93.4  94.4    MCH 30.9  31.5    MCHC 33.1  33.3    RDW 13.2  11.8    Platelets 257  277                   Assessment and Plan     Diagnoses and all orders for this visit:    1. Benign essential hypertension (Primary)    2. Palpitations  -     Ambulatory Referral to Cardiology  -     CBC & Differential  -     Comprehensive Metabolic Panel  -     TSH    3. Anxiety  -     mirtazapine (Remeron) 15 MG tablet; Take 1 tablet by mouth Every Night.  Dispense: 30 tablet; Refill: 4    4. Moderate episode of recurrent major depressive disorder  -     mirtazapine (Remeron) 15 MG tablet; Take 1 tablet by mouth Every Night.  Dispense: 30 tablet; Refill: 4    5. Weight loss    6. Pernicious anemia  -     Vitamin B12    7. Gastroesophageal reflux disease with esophagitis without hemorrhage  -     pantoprazole (PROTONIX) 40 MG EC tablet; Take 1 tablet by mouth Daily. Half hour prior  To breakfast  Dispense: 30 tablet; Refill: 4    8. Nausea  -     ondansetron (ZOFRAN) 4  MG tablet; Take 1 tablet by mouth Every 8 (Eight) Hours As Needed for Nausea or Vomiting.  Dispense: 30 tablet; Refill: 1    9. Bereavement    Plan:  1.  Benign essential hypertension: Will continue current medication, low-sodium diet advised, Counseled to regularly check BP at home with goal averaging <130/80.   2.palpitations: Will obtain labs and refer patient to cardiology  3.  Anxiety disorder: We will continue Prozac and start Remeron at bedtime  4 major depression: Will continue Prozac and start Remeron at bedtime.  5.  Weight loss: Will obtain labs, patient encouraged to eat  6.  Pernicious anemia: Will obtain labs  7.  GERD: Refill Protonix  8.  Nausea: As needed antiemetics  9.  Bereavement: Will monitor       Follow Up     Return in about 2 weeks (around 5/29/2024).  Patient was given instructions and counseling regarding her condition or for health maintenance advice. Please see specific information pulled into the AVS if appropriate.

## 2024-05-29 ENCOUNTER — OFFICE VISIT (OUTPATIENT)
Dept: INTERNAL MEDICINE | Facility: CLINIC | Age: 59
End: 2024-05-29
Payer: OTHER GOVERNMENT

## 2024-05-29 VITALS
SYSTOLIC BLOOD PRESSURE: 113 MMHG | TEMPERATURE: 98.2 F | WEIGHT: 125.8 LBS | HEIGHT: 64 IN | OXYGEN SATURATION: 100 % | DIASTOLIC BLOOD PRESSURE: 77 MMHG | HEART RATE: 71 BPM | BODY MASS INDEX: 21.48 KG/M2

## 2024-05-29 DIAGNOSIS — F51.04 INSOMNIA, PSYCHOPHYSIOLOGICAL: ICD-10-CM

## 2024-05-29 DIAGNOSIS — I10 BENIGN ESSENTIAL HYPERTENSION: ICD-10-CM

## 2024-05-29 DIAGNOSIS — E53.8 VITAMIN B12 DEFICIENCY: ICD-10-CM

## 2024-05-29 DIAGNOSIS — M25.552 BILATERAL HIP PAIN: Primary | ICD-10-CM

## 2024-05-29 DIAGNOSIS — R20.2 HAND TINGLING: ICD-10-CM

## 2024-05-29 DIAGNOSIS — M25.551 BILATERAL HIP PAIN: Primary | ICD-10-CM

## 2024-05-29 PROCEDURE — 96372 THER/PROPH/DIAG INJ SC/IM: CPT | Performed by: INTERNAL MEDICINE

## 2024-05-29 PROCEDURE — 99214 OFFICE O/P EST MOD 30 MIN: CPT | Performed by: INTERNAL MEDICINE

## 2024-05-29 RX ORDER — AMLODIPINE BESYLATE 5 MG/1
2.5 TABLET ORAL DAILY
Start: 2024-05-29

## 2024-05-29 RX ORDER — MIRTAZAPINE 7.5 MG/1
7.5 TABLET, FILM COATED ORAL NIGHTLY
Qty: 30 TABLET | Refills: 4 | Status: SHIPPED | OUTPATIENT
Start: 2024-05-29

## 2024-05-29 RX ADMIN — CYANOCOBALAMIN 1000 MCG: 1000 INJECTION, SOLUTION INTRAMUSCULAR; SUBCUTANEOUS at 11:25

## 2024-05-29 NOTE — PROGRESS NOTES
"Chief Complaint  Tingling, Insomnia, and Hypertension    Subjective        Ernestina Medina presents to [facility]  Patient is here to follow-up on her blood pressure which is running on the low side, she has lost weight recently after  passed away a few weeks ago, she is advised to monitor vitals and if her blood pressure is running on the lower side she is advised to take amlodipine at half dose, she is also complaining of pain on the lateral part of both thighs and hips, she also has tingling in the center of her right palm only, she is due for a B12 shot, she does complain of insomnia for which she was given Remeron but it is making her too sleepy and groggy in the morning    Objective   Vital Signs:  /77   Pulse 71   Temp 98.2 °F (36.8 °C) (Tympanic)   Ht 162.6 cm (64\")   Wt 57.1 kg (125 lb 12.8 oz)   SpO2 100%   BMI 21.59 kg/m²   Estimated body mass index is 21.59 kg/m² as calculated from the following:    Height as of this encounter: 162.6 cm (64\").    Weight as of this encounter: 57.1 kg (125 lb 12.8 oz).       BMI is within normal parameters. No other follow-up for BMI required.      Physical Exam  Vitals and nursing note reviewed.   Constitutional:       General: She is not in acute distress.     Appearance: Normal appearance. She is not diaphoretic.   HENT:      Head: Normocephalic and atraumatic.      Right Ear: External ear normal.      Left Ear: External ear normal.      Nose: Nose normal.   Eyes:      Extraocular Movements: Extraocular movements intact.      Conjunctiva/sclera: Conjunctivae normal.   Neck:      Trachea: Trachea normal.   Cardiovascular:      Rate and Rhythm: Normal rate and regular rhythm.      Heart sounds: Normal heart sounds.   Pulmonary:      Effort: Pulmonary effort is normal. No respiratory distress.      Breath sounds: Normal breath sounds.   Abdominal:      General: Abdomen is flat.   Musculoskeletal:      Cervical " back: Neck supple.      Comments: Moves all limbs   Skin:     General: Skin is warm.   Neurological:      Mental Status: She is alert and oriented to person, place, and time.      Comments: No gross motor or sensory deficits        Result Review :    The following data was reviewed by: Uma Anne MD on 05/29/2024:  CMP          7/2/2023    21:52 11/27/2023    11:34 5/15/2024    10:03   CMP   Glucose 91  90  89    BUN 16  18  16    Creatinine 0.54  0.58  0.69    EGFR 108.2      Sodium 144  137  140    Potassium 4.2  4.5  4.5    Chloride 105  102  104    Calcium 9.6  9.6  9.9    Total Protein  7.4  7.0    Total Protein 7.5      Albumin 4.9  5.1  4.8    Globulin  2.3  2.2    Globulin 2.6      Total Bilirubin 0.2  0.5  0.4    Alkaline Phosphatase 86  83  80    AST (SGOT) 21  25  27    ALT (SGPT) 16  21  26    Albumin/Globulin Ratio 1.9      BUN/Creatinine Ratio 29.6  31.0  23.2    Anion Gap 15.2        CBC          7/2/2023    21:52 11/27/2023    11:34 5/15/2024    10:03   CBC   WBC 5.68  4.33  4.00    RBC 3.95  4.32  4.35    Hemoglobin 12.2  13.6  13.8    Hematocrit 36.9  40.8  41.2    MCV 93.4  94.4  94.7    MCH 30.9  31.5  31.7    MCHC 33.1  33.3  33.5    RDW 13.2  11.8  11.7    Platelets 257  277  257                   Assessment and Plan     Diagnoses and all orders for this visit:    1. Bilateral hip pain (Primary)  -     Ambulatory Referral to Orthopedic Surgery    2. Benign essential hypertension  -     amLODIPine (Norvasc) 5 MG tablet; Take 0.5 tablets by mouth Daily.    3. Hand tingling  -     Ambulatory Referral to Hand Surgery    4. Insomnia, psychophysiological  -     mirtazapine (REMERON) 7.5 MG tablet; Take 1 tablet by mouth Every Night.  Dispense: 30 tablet; Refill: 4    5. Vitamin B12 deficiency    Plan:  1.  Benign essential hypertension: Will continue current medication but she can cut back to half the dose if her blood pressure is dropping, oral hydration, counseled to regularly check BP at home  with goal averaging <130/80.   2.  bilateral hip pain: Refer to orthopedist,X-rays reviewed   3. hand tingling: Will refer patient to hand surgery  4. Insomnia: Will decrease Remeron 7.5 mg daily   5. B12 deficiency: B12 IM given in office today         Follow Up     Patient was given instructions and counseling regarding her condition or for health maintenance advice. Please see specific information pulled into the AVS if appropriate.

## 2024-06-03 ENCOUNTER — OFFICE VISIT (OUTPATIENT)
Age: 59
End: 2024-06-03
Payer: OTHER GOVERNMENT

## 2024-06-03 VITALS
SYSTOLIC BLOOD PRESSURE: 128 MMHG | DIASTOLIC BLOOD PRESSURE: 82 MMHG | BODY MASS INDEX: 20.83 KG/M2 | HEIGHT: 64 IN | WEIGHT: 122 LBS

## 2024-06-03 DIAGNOSIS — G56.01 CARPAL TUNNEL SYNDROME OF RIGHT WRIST: Primary | ICD-10-CM

## 2024-06-03 PROCEDURE — 99213 OFFICE O/P EST LOW 20 MIN: CPT | Performed by: PLASTIC SURGERY

## 2024-06-03 NOTE — PROGRESS NOTES
"                                                               [facility]     Office Visit       Date: 06/03/2024   Patient Name: Ernestina Medina  MRN: [id number]  YOB: 1965    Referring Physician: Uma Anne MD     Chief Complaint:   Chief Complaint   Patient presents with   • Right Hand - Initial Evaluation       History of Present Illness:   Ernestina Medina is a 59 y.o. female hand dominant presents with right hand numbness and tingling approximately 6 months duration.  She reports numbness tingling in her palm that radiates distally into her thumb index middle finger.  Reports that it is intermittent.  Has not tried bracing.  She has not had a previous nerve study.  She is otherwise healthy.  She works as a realtor.  She denies smoking.      Subjective   Review of Systems:   Review of Systems   Constitutional: Negative.    HENT: Negative.     Eyes: Negative.    Respiratory: Negative.     Cardiovascular: Negative.    Gastrointestinal: Negative.    Endocrine: Negative.    Genitourinary: Negative.    Musculoskeletal:  Positive for arthralgias.   Skin: Negative.    Allergic/Immunologic: Negative.    Neurological: Negative.    Hematological: Negative.    Psychiatric/Behavioral: Negative.          Pertinent review of systems per HPI.     I reviewed the patient's chief complaint, history of present illness, review of systems, past medical history, surgical history, family history, social history, medications and allergy list in the EMR on 06/03/2024 and agree with the findings above.    Objective    Vital Signs:   Vitals:    06/03/24 1443   BP: 128/82   Weight: 55.3 kg (122 lb)   Height: 162.6 cm (64\")     BMI: Body mass index is 20.94 kg/m².    General Appearance: No acute distress. Alert and oriented.     Chest:  Non-labored breathing on room air. Regular rate and rhythm.    Upper Extremity Exam:    Positive Tinel at the right carpal tunnel.  Positive carpal " compression test on the right.  Negative Tinel at the right cubital tunnel.  Negative elbow flexion compression test on the right.  No thenar wasting.  No intrinsic wasting.  Negative Tinel at the right Guyon's canal.    Fingers are warm, well-perfused with appropriate capillary refill.  Palpable radial pulse.    Sensation intact to light touch in median, radial and ulnar nerve distributions.    Motor- Fires FPL, ulnar intrinsics, EPL/EDC w/ full active and passive range of motion. Strength intact.    Non-tender except for in the areas highlighted    Imaging/Studies:   Imaging Results (Last 24 Hours)       ** No results found for the last 24 hours. **                Procedures:  Procedures    Quality Measures:   ACP:   ACP discussion was deferred.    Tobacco:   Ernestina Medina  reports that she has never smoked. She has never been exposed to tobacco smoke. She has never used smokeless tobacco.      Assessment / Plan    Assessment/Plan:     There are no diagnoses linked to this encounter.     Ernestina Medinais a 59 y.o. female who presents with:      ICD-10-CM ICD-9-CM   1. Carpal tunnel syndrome of right wrist  G56.01 354.0         Patient presents with right carpal tunnel syndrome.  I discussed the pathophysiology of carpal tunnel syndrome as well as the options for treatment.  Recommend right wrist EMG and nerve conduction studies as well as wrist cock up brace at nighttime for symptoms.  Recommend patient follow-up after test to discuss the results and treatment options.    Follow Up:   Return for Follow-up after EMG/NCS.        Jamaal Gregory MD  [facility] [place] Hand and Upper Extremity Surgeon

## 2024-06-05 ENCOUNTER — OFFICE VISIT (OUTPATIENT)
Dept: CARDIOLOGY | Facility: CLINIC | Age: 59
End: 2024-06-05
Payer: OTHER GOVERNMENT

## 2024-06-05 VITALS
DIASTOLIC BLOOD PRESSURE: 82 MMHG | RESPIRATION RATE: 18 BRPM | OXYGEN SATURATION: 98 % | BODY MASS INDEX: 21.54 KG/M2 | HEIGHT: 64 IN | HEART RATE: 68 BPM | WEIGHT: 126.2 LBS | SYSTOLIC BLOOD PRESSURE: 128 MMHG

## 2024-06-05 DIAGNOSIS — F41.9 ANXIETY: ICD-10-CM

## 2024-06-05 DIAGNOSIS — R00.2 PALPITATIONS: Primary | ICD-10-CM

## 2024-06-05 DIAGNOSIS — E78.2 MIXED HYPERLIPIDEMIA: ICD-10-CM

## 2024-06-05 DIAGNOSIS — I10 ESSENTIAL HYPERTENSION: ICD-10-CM

## 2024-06-05 RX ORDER — ACETAMINOPHEN 500 MG
500 TABLET ORAL EVERY 6 HOURS PRN
COMMUNITY

## 2024-06-05 NOTE — ASSESSMENT & PLAN NOTE
-Being managed by PCP  -Agreeable to ambulatory referral to behavioral health for potential counseling

## 2024-06-05 NOTE — ASSESSMENT & PLAN NOTE
-Being followed by PCP  -May benefit from statin therapy-continue with diet and exercise modifications

## 2024-06-05 NOTE — ASSESSMENT & PLAN NOTE
-Holter monitor placed for 7 days to rule out arrhythmias  -May benefit from trial of Metoprolol if holter monitor indicates symptomatic palpitations  -May be related to increased stress/ anxiety  -Clinic to call with results and patient to follow up as needed with normal holter.  Any abnormalities noted will have patient return for follow up.     Dry/Warm

## 2024-06-14 DIAGNOSIS — G56.01 CARPAL TUNNEL SYNDROME OF RIGHT WRIST: ICD-10-CM

## 2024-06-17 ENCOUNTER — OFFICE VISIT (OUTPATIENT)
Age: 59
End: 2024-06-17
Payer: OTHER GOVERNMENT

## 2024-06-17 VITALS
DIASTOLIC BLOOD PRESSURE: 76 MMHG | HEIGHT: 64 IN | SYSTOLIC BLOOD PRESSURE: 110 MMHG | BODY MASS INDEX: 21.51 KG/M2 | WEIGHT: 126 LBS

## 2024-06-17 DIAGNOSIS — G56.01 CARPAL TUNNEL SYNDROME OF RIGHT WRIST: Primary | ICD-10-CM

## 2024-06-17 PROCEDURE — 20526 THER INJECTION CARP TUNNEL: CPT | Performed by: PLASTIC SURGERY

## 2024-06-17 PROCEDURE — 99214 OFFICE O/P EST MOD 30 MIN: CPT | Performed by: PLASTIC SURGERY

## 2024-06-17 RX ORDER — TRIAMCINOLONE ACETONIDE 40 MG/ML
20 INJECTION, SUSPENSION INTRA-ARTICULAR; INTRAMUSCULAR
Status: COMPLETED | OUTPATIENT
Start: 2024-06-17 | End: 2024-06-17

## 2024-06-17 RX ORDER — LIDOCAINE HYDROCHLORIDE 10 MG/ML
0.5 INJECTION, SOLUTION EPIDURAL; INFILTRATION; INTRACAUDAL; PERINEURAL
Status: COMPLETED | OUTPATIENT
Start: 2024-06-17 | End: 2024-06-17

## 2024-06-17 RX ADMIN — TRIAMCINOLONE ACETONIDE 20 MG: 40 INJECTION, SUSPENSION INTRA-ARTICULAR; INTRAMUSCULAR at 15:05

## 2024-06-17 RX ADMIN — LIDOCAINE HYDROCHLORIDE 0.5 ML: 10 INJECTION, SOLUTION EPIDURAL; INFILTRATION; INTRACAUDAL; PERINEURAL at 15:05

## 2024-06-17 NOTE — PROGRESS NOTES
Procedure   - Hand/Upper Extremity Injection: R carpal tunnel for carpal tunnel syndrome on 6/17/2024 3:05 PM  Indications: pain  Details: 27 G (Long  ) needle, volar approach  Medications: 0.5 mL lidocaine PF 1% 1 %; 20 mg triamcinolone acetonide 40 MG/ML  Outcome: tolerated well, no immediate complications  Procedure, treatment alternatives, risks and benefits explained, specific risks discussed. Consent was given by the patient. Immediately prior to procedure a time out was called to verify the correct patient, procedure, equipment, support staff and site/side marked as required. Patient was prepped and draped in the usual sterile fashion.

## 2024-06-17 NOTE — PROGRESS NOTES
[facility]     Follow-up Office Visit       Date: 06/17/2024   Patient Name: Ernestina Medina  MRN: [id number]  YOB: 1965    Chief Complaint:   Chief Complaint   Patient presents with    Follow-up     2 week EMG follow up -- EMG done 6/13/24; Carpal tunnel syndrome of right wrist       History of Present Illness:   Ernestina Medina is a 59 y.o. female presents for follow-up of right hand numbness and tingling.  She had an EMG nerve conduction study done since her last visit which demonstrated evidence of right carpal tunnel syndrome.  She has not been wearing the brace since her last visit.  No new concerns      Subjective   Review of Systems:   Review of Systems   Constitutional:  Negative for chills, fever, unexpected weight gain and unexpected weight loss.   HENT:  Negative for congestion, postnasal drip and rhinorrhea.    Eyes:  Negative for blurred vision.   Respiratory:  Negative for shortness of breath.    Cardiovascular:  Negative for leg swelling.   Gastrointestinal:  Negative for abdominal pain, nausea and vomiting.   Genitourinary:  Negative for difficulty urinating.   Musculoskeletal:  Positive for arthralgias. Negative for gait problem, joint swelling and myalgias.   Skin:  Negative for skin lesions and wound.   Neurological:  Negative for dizziness, weakness, light-headedness and numbness.   Hematological:  Does not bruise/bleed easily.   Psychiatric/Behavioral:  Negative for depressed mood.         Pertinent review of systems per HPI    I reviewed the patient's chief complaint, history of present illness, review of systems, past medical history, surgical history, family history, social history, medications and allergy list in the EMR on 06/17/2024 and agree with the findings above.    Objective    Vital Signs:   Vitals:    06/17/24 1452   BP: 110/76   Weight: 57.2 kg (126 lb)   Height:  "162.6 cm (64.02\")     BMI: Body mass index is 21.62 kg/m².     General Appearance: No acute distress. Alert and oriented.     Chest:  Non-labored breathing on room air      Ortho Exam:  Positive Tinel at the right carpal tunnel.  Positive carpal compression test on the right.  Negative Tinel at the right cubital tunnel.  Negative elbow flexion compression test on the right.  No thenar wasting.  No intrinsic wasting.  Negative Tinel at the right Guyon's canal.     Fingers are warm, well-perfused with appropriate capillary refill.  Palpable radial pulse.     Sensation intact to light touch in median, radial and ulnar nerve distributions.     Motor- Fires FPL, ulnar intrinsics, EPL/EDC w/ full active and passive range of motion. Strength intact.     Non-tender except for in the areas highlighted  Imaging/Studies:   Imaging Results (Last 24 Hours)       ** No results found for the last 24 hours. **            EMG conduction studies from 6/14/2024 were independently reviewed and interpreted by myself and demonstrate evidence of very mild right carpal tunnel syndrome.    Procedures:  Procedures    Quality Measures:   ACP:   ACP discussion was deferred.    Tobacco:   Ernestina Orellana Adam  reports that she has never smoked. She has never been exposed to tobacco smoke. She has never used smokeless tobacco.    Assessment / Plan    Assessment/Plan:      Diagnosis Plan   1. Carpal tunnel syndrome of right wrist            Patient presents with right carpal tunnel syndrome.  EMG nerve conduction studies confirms carpal tunnel syndrome and very mild degree.  We again discussed the pathophysiology as well as the options for treatment including nighttime bracing, corticosteroid injection and surgical release.  Patient would like to proceed with right wrist corticosteroid injection at this time.  Recommend patient follow-up as needed if symptoms persist or return despite injection.    Procedure Note- Carpal Tunnel " Injection:    I discussed with the patient the potential benefits of performing therapeutic injections as well as potential risks including but not limited to infection, swelling, pain, bleeding, bruising, nerve/vessel damage, skin color changes, transient elevation in blood glucose levels, and fat atrophy. After informed consent and after the areas were prepped with chlorhexadine soap, ethyl chloride was used to numb the skin. The palmaris as a landmark, 20mg of Kenalog and 0.5 cc of 1% lidocaine was injected into the right carpal tunnel, taking care to avoid injecting directly into the median nerve.  The patient tolerated the procedure well. There were no complications. A sterile dressing was placed over the injection sites.      Follow Up:   Return if symptoms worsen or fail to improve.        Jamaal Gregory MD  [facility] [place] [facility] [place] Sur[facility]

## 2024-06-25 ENCOUNTER — OFFICE VISIT (OUTPATIENT)
Dept: BEHAVIORAL HEALTH | Facility: CLINIC | Age: 59
End: 2024-06-25
Payer: OTHER GOVERNMENT

## 2024-06-25 VITALS — BODY MASS INDEX: 21.41 KG/M2 | WEIGHT: 124.8 LBS

## 2024-06-25 DIAGNOSIS — F41.1 GENERALIZED ANXIETY DISORDER: ICD-10-CM

## 2024-06-25 DIAGNOSIS — F33.2 SEVERE EPISODE OF RECURRENT MAJOR DEPRESSIVE DISORDER, WITHOUT PSYCHOTIC FEATURES: Primary | ICD-10-CM

## 2024-06-25 DIAGNOSIS — F43.21 GRIEF: ICD-10-CM

## 2024-06-25 DIAGNOSIS — F43.9 TRAUMA AND STRESSOR-RELATED DISORDER: ICD-10-CM

## 2024-06-25 PROCEDURE — 90791 PSYCH DIAGNOSTIC EVALUATION: CPT | Performed by: COUNSELOR
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
## 2024-07-13 LAB
ALBUMIN SERPL-MCNC: 4.5 G/DL (ref 3.5–5.2)
ALBUMIN/GLOB SERPL: 2.3 G/DL
ALP SERPL-CCNC: 70 U/L (ref 39–117)
ALT SERPL-CCNC: 17 U/L (ref 1–33)
AST SERPL-CCNC: 22 U/L (ref 1–32)
BILIRUB SERPL-MCNC: 0.3 MG/DL (ref 0–1.2)
BUN SERPL-MCNC: 21 MG/DL (ref 6–20)
BUN/CREAT SERPL: 30.9 (ref 7–25)
CALCIUM SERPL-MCNC: 9.5 MG/DL (ref 8.6–10.5)
CHLORIDE SERPL-SCNC: 106 MMOL/L (ref 98–107)
CO2 SERPL-SCNC: 26.4 MMOL/L (ref 22–29)
CREAT SERPL-MCNC: 0.68 MG/DL (ref 0.57–1)
EGFRCR SERPLBLD CKD-EPI 2021: 101.7 ML/MIN/1.73
ERYTHROCYTE [DISTWIDTH] IN BLOOD BY AUTOMATED COUNT: 12.8 % (ref 12.3–15.4)
GLOBULIN SER CALC-MCNC: 2 GM/DL
GLUCOSE SERPL-MCNC: 85 MG/DL (ref 65–99)
HCT VFR BLD AUTO: 37.5 % (ref 34–46.6)
HGB BLD-MCNC: 12.2 G/DL (ref 12–15.9)
MCH RBC QN AUTO: 31.7 PG (ref 26.6–33)
MCHC RBC AUTO-ENTMCNC: 32.5 G/DL (ref 31.5–35.7)
MCV RBC AUTO: 97.4 FL (ref 79–97)
PLATELET # BLD AUTO: 267 10*3/MM3 (ref 140–450)
POTASSIUM SERPL-SCNC: 4.8 MMOL/L (ref 3.5–5.2)
PROT SERPL-MCNC: 6.5 G/DL (ref 6–8.5)
RBC # BLD AUTO: 3.85 10*6/MM3 (ref 3.77–5.28)
SODIUM SERPL-SCNC: 140 MMOL/L (ref 136–145)
VIT B12 SERPL-MCNC: 399 PG/ML (ref 211–946)
WBC # BLD AUTO: 4.51 10*3/MM3 (ref 3.4–10.8)

## 2024-07-15 ENCOUNTER — OFFICE VISIT (OUTPATIENT)
Dept: INTERNAL MEDICINE | Facility: CLINIC | Age: 59
End: 2024-07-15
Payer: OTHER GOVERNMENT

## 2024-07-15 VITALS
WEIGHT: 127 LBS | BODY MASS INDEX: 21.68 KG/M2 | HEART RATE: 63 BPM | DIASTOLIC BLOOD PRESSURE: 78 MMHG | TEMPERATURE: 97.7 F | OXYGEN SATURATION: 100 % | SYSTOLIC BLOOD PRESSURE: 120 MMHG | HEIGHT: 64 IN | RESPIRATION RATE: 18 BRPM

## 2024-07-15 DIAGNOSIS — M79.2 NEURALGIA: ICD-10-CM

## 2024-07-15 DIAGNOSIS — F51.04 INSOMNIA, PSYCHOPHYSIOLOGICAL: ICD-10-CM

## 2024-07-15 DIAGNOSIS — M54.2 CERVICALGIA: ICD-10-CM

## 2024-07-15 DIAGNOSIS — Z51.81 THERAPEUTIC DRUG MONITORING: ICD-10-CM

## 2024-07-15 DIAGNOSIS — E53.8 VITAMIN B12 DEFICIENCY: ICD-10-CM

## 2024-07-15 DIAGNOSIS — I10 BENIGN ESSENTIAL HYPERTENSION: Primary | ICD-10-CM

## 2024-07-15 DIAGNOSIS — E78.2 MIXED HYPERLIPIDEMIA: ICD-10-CM

## 2024-07-15 DIAGNOSIS — F41.9 ANXIETY: ICD-10-CM

## 2024-07-15 PROCEDURE — 96372 THER/PROPH/DIAG INJ SC/IM: CPT | Performed by: INTERNAL MEDICINE

## 2024-07-15 PROCEDURE — 99214 OFFICE O/P EST MOD 30 MIN: CPT | Performed by: INTERNAL MEDICINE

## 2024-07-15 RX ORDER — AMLODIPINE BESYLATE 5 MG/1
5 TABLET ORAL NIGHTLY
Qty: 90 TABLET | Refills: 1
Start: 2024-07-15

## 2024-07-15 RX ORDER — FLUOXETINE HYDROCHLORIDE 40 MG/1
40 CAPSULE ORAL DAILY
Qty: 90 CAPSULE | Refills: 1 | Status: SHIPPED | OUTPATIENT
Start: 2024-07-15

## 2024-07-15 RX ADMIN — CYANOCOBALAMIN 1000 MCG: 1000 INJECTION, SOLUTION INTRAMUSCULAR; SUBCUTANEOUS at 14:07

## 2024-07-15 NOTE — PROGRESS NOTES
"Chief Complaint  Hypertension (Follow up on lab results) and Hyperlipidemia    Subjective        Ernestina Medina presents to [facility]  HPI: Patient is here to follow up on the blood pressure  The patient is taking the blood pressure medications as prescribed and has had no side effects. The patient is also here to follow up on the cholesterol and  had lab work done .  The patient also needs is here to follow-up on anxiety, which has worsened since her  passed away recently, she has been taking the Prozac it does help to some terms with alprazolam and she needs refills on medications .  He was seen by his therapist and it has helped her, she has an appointment to see psychiatry, she does complain of neck pain which is worsening and she has been having tingling and numbness, she was seen by orthopedist and was diagnosed with right carpal tunnel syndrome and had nerve conduction test done but given current and worsening symptoms she was advised to rule out neck issues as a cause of her symptoms prior to undergoing carpal tunnel surgery, she needs a B12 shot today  Hyperlipidemia   Pertinent negatives include no chest pain or shortness of breath.   Hypertension   Pertinent negatives include no chest pain, palpitations or shortness of breath.      Objective   Vital Signs:  /78   Pulse 63   Temp 97.7 °F (36.5 °C)   Resp 18   Ht 162.6 cm (64.02\")   Wt 57.6 kg (127 lb)   SpO2 100%   BMI 21.79 kg/m²   Estimated body mass index is 21.79 kg/m² as calculated from the following:    Height as of this encounter: 162.6 cm (64.02\").    Weight as of this encounter: 57.6 kg (127 lb).       BMI is within normal parameters. No other follow-up for BMI required.      Physical Exam  Vitals and nursing note reviewed.   Constitutional:       General: She is not in acute distress.     Appearance: Normal appearance. She is not diaphoretic.   HENT:      Head: Normocephalic and " atraumatic.      Right Ear: External ear normal.      Left Ear: External ear normal.      Nose: Nose normal.   Eyes:      Extraocular Movements: Extraocular movements intact.      Conjunctiva/sclera: Conjunctivae normal.   Neck:      Trachea: Trachea normal.   Cardiovascular:      Rate and Rhythm: Normal rate and regular rhythm.      Heart sounds: Normal heart sounds.   Pulmonary:      Effort: Pulmonary effort is normal. No respiratory distress.      Breath sounds: Normal breath sounds.   Abdominal:      General: Abdomen is flat.   Musculoskeletal:         General: Deformity present.      Cervical back: Neck supple.      Comments: Moves all limbs   Skin:     General: Skin is warm.   Neurological:      Mental Status: She is alert and oriented to person, place, and time.      Comments: No gross motor or sensory deficits        Result Review :    The following data was reviewed by: Uma Anne MD on 07/15/2024:  Common labs          11/27/2023    11:34 5/15/2024    10:03 7/12/2024    13:32   Common Labs   Glucose 90  89  85    BUN 18  16  21    Creatinine 0.58  0.69  0.68    Sodium 137  140  140    Potassium 4.5  4.5  4.8    Chloride 102  104  106    Calcium 9.6  9.9  9.5    Total Protein 7.4  7.0  6.5    Albumin 5.1  4.8  4.5    Total Bilirubin 0.5  0.4  0.3    Alkaline Phosphatase 83  80  70    AST (SGOT) 25  27  22    ALT (SGPT) 21  26  17    WBC 4.33  4.00  4.51    Hemoglobin 13.6  13.8  12.2    Hematocrit 40.8  41.2  37.5    Platelets 277  257  267    Total Cholesterol 259      Triglycerides 54      HDL Cholesterol 110      LDL Cholesterol  141          [facility]            Assessment and Plan     Diagnoses and all orders for this visit:    1. Benign essential hypertension (Primary)  -     amLODIPine (Norvasc) 5 MG tablet; Take 1 tablet by mouth Every Night.  Dispense: 90 tablet; Refill: 1    2. Mixed hyperlipidemia  -     CBC (No Diff)  -     Comprehensive  Metabolic Panel  -     Lipid Panel    3. Anxiety  -     Compliance Drug Analysis, Ur - Urine, Clean Catch  -     FLUoxetine (PROzac) 40 MG capsule; Take 1 capsule by mouth Daily.  Dispense: 90 capsule; Refill: 1  -     ALPRAZolam (XANAX) 1 MG tablet; Take 1 tablet by mouth 2 (Two) Times a Day As Needed for Anxiety.  Dispense: 60 tablet; Refill: 4    4. Insomnia, psychophysiological  -     Compliance Drug Analysis, Ur - Urine, Clean Catch  -     ALPRAZolam (XANAX) 1 MG tablet; Take 1 tablet by mouth 2 (Two) Times a Day As Needed for Anxiety.  Dispense: 60 tablet; Refill: 4    5. Neuralgia  -     MRI Cervical Spine Without Contrast  -     Ambulatory Referral to Neurosurgery    6. Cervicalgia  -     MRI Cervical Spine Without Contrast  -     Ambulatory Referral to Neurosurgery    7. Vitamin B12 deficiency  -     Vitamin B12    8. Therapeutic drug monitoring  -     Compliance Drug Analysis, Ur - Urine, Clean Catch      Plan:  1.  Benign essential hypertension: Will continue current medication, low-sodium diet advised, Counseled to regularly check BP at home with goal averaging <130/80.   2.mixed hyperlipidemia:  reviewed  fasting CMP and lipid panel.  Diet and exercise counseled,     3. Anxiety : SANCHEZ and David reviewed and obtained and refilled alprazolam, also refill Prozac, she was seen by therapist, has an appointment to see psychiatry  4. Neuralgia: Will get MRI and refer patient to neurosurgery  5. Cervicalgia: Will get MRI and refer patient to neurosurgery  6. B12 deficiency : B12 IM given in office today       Follow Up     Return in about 4 months (around 11/15/2024).  Patient was given instructions and counseling regarding her condition or for health maintenance advice. Please see specific information pulled into the AVS if appropriate.

## 2024-07-20 RX ORDER — ALPRAZOLAM 1 MG/1
1 TABLET ORAL 2 TIMES DAILY PRN
Qty: 60 TABLET | Refills: 4 | Status: SHIPPED | OUTPATIENT
Start: 2024-07-20

## 2024-07-23 LAB — DRUGS UR: NORMAL

## 2024-08-03 ENCOUNTER — APPOINTMENT (OUTPATIENT)
Dept: CT IMAGING | Facility: HOSPITAL | Age: 59
End: 2024-08-03
Payer: OTHER GOVERNMENT

## 2024-08-03 ENCOUNTER — HOSPITAL ENCOUNTER (EMERGENCY)
Facility: HOSPITAL | Age: 59
Discharge: HOME | End: 2024-08-03
Attending: STUDENT IN AN ORGANIZED HEALTH CARE EDUCATION/TRAINING PROGRAM
Payer: OTHER GOVERNMENT

## 2024-08-03 VITALS
RESPIRATION RATE: 16 BRPM | WEIGHT: 124 LBS | SYSTOLIC BLOOD PRESSURE: 151 MMHG | BODY MASS INDEX: 21.17 KG/M2 | TEMPERATURE: 98.2 F | OXYGEN SATURATION: 100 % | HEART RATE: 76 BPM | HEIGHT: 64 IN | DIASTOLIC BLOOD PRESSURE: 95 MMHG

## 2024-08-03 DIAGNOSIS — M54.50 ACUTE LOW BACK PAIN WITHOUT SCIATICA, UNSPECIFIED BACK PAIN LATERALITY: ICD-10-CM

## 2024-08-03 DIAGNOSIS — R07.89 RIGHT-SIDED CHEST WALL PAIN: Primary | ICD-10-CM

## 2024-08-03 DIAGNOSIS — T14.8XXA MUSCULOSKELETAL STRAIN: ICD-10-CM

## 2024-08-03 PROCEDURE — 71250 CT THORAX DX C-: CPT

## 2024-08-03 PROCEDURE — 99284 EMERGENCY DEPT VISIT MOD MDM: CPT

## 2024-08-03 PROCEDURE — 74176 CT ABD & PELVIS W/O CONTRAST: CPT

## 2024-08-03 RX ORDER — HYDROCODONE BITARTRATE AND ACETAMINOPHEN 5; 325 MG/1; MG/1
1 TABLET ORAL ONCE
Status: COMPLETED | OUTPATIENT
Start: 2024-08-03 | End: 2024-08-03

## 2024-08-03 RX ORDER — METHOCARBAMOL 500 MG/1
500 TABLET, FILM COATED ORAL 4 TIMES DAILY PRN
Qty: 20 TABLET | Refills: 0 | Status: SHIPPED | OUTPATIENT
Start: 2024-08-03

## 2024-08-03 RX ADMIN — HYDROCODONE BITARTRATE AND ACETAMINOPHEN 1 TABLET: 5; 325 TABLET ORAL at 12:29

## 2024-08-03 NOTE — DISCHARGE INSTRUCTIONS
Take over-the-counter medicine such as Tylenol or Motrin for your symptoms.  For breakthrough symptoms you may take muscle relaxer as prescribed as needed.  Do not combine with other stated medications or alcohol.  No driving or operating heavy machinery while taking this medication.

## 2024-08-03 NOTE — ED PROVIDER NOTES
[facility] EMERGENCY DEPARTMENT  Emergency Department Encounter  Emergency Medicine Physician Note       Pt Name: Ernestina Medina  MRN: [id number]  Pt :   1965  Room Number:  24SF/24  Date of encounter:  8/3/2024  PCP: Uma Anne MD  ED Provider: Harley Chavarria MD    Historian: Patient      HPI:  Chief Complaint: Fall        Context: Ernestina Medina is a 59 y.o. female who presents to the ED for ground level fall.  Patient presents after ground-level fall.  She reports she was attempting to sit on the couch last night and missed the couch and fell on her right side onto hardwood floor.  There was no head injury or loss of consciousness.  She reports waking up with moderate to severe right-sided rib pain as well as low back pain and right lower abdominal pain.  She denies blood thinner use.  She has not take anything for her symptoms.  No pain to any extremity.  Denies headache or neck pain.      PAST MEDICAL HISTORY  Past Medical History:   Diagnosis Date    Abdominal pain     Anxiety     Asthma     Elbow pain     Endometriosis     Fatigue     H/O seasonal allergies     Hand fracture, right     Herpes labialis     Joint pain of ankle and foot     Leukopenia     Migraine     Ovarian cyst     Pain in left knee     Pain in limb     Paresthesia     Pernicious anemia     Plantar fasciitis     Polycystic ovarian syndrome     PONV (postoperative nausea and vomiting)     Recurrent aphthous stomatitis     Tattoo     Vitamin B12 deficiency     Wrist fracture, right          PAST SURGICAL HISTORY  Past Surgical History:   Procedure Laterality Date    BREAST SURGERY  2009    BUNIONECTOMY Right 2021    Procedure: FIRST METATARSAL OSTEOTOMY  BUNIONECTOMY RIGHT FOOT WITH POSSIBLE ARTHROSURFACE JOINT REPLACEMENT RIGHT FOOT;  Surgeon: Prateek Martinez DPM;  Location: [facility];  Service: Podiatry;  Laterality: Right;    CHOLECYSTECTOMY      ENDOSCOPY N/A 2017     Procedure: ESOPHAGOGASTRODUODENOSCOPY WITH BIOPSIES AND COLD BIOPSY POLYPECTOMY;  Surgeon: Yovany Pacheco MD;  Location: [facility] ENDOSCOPY;  Service:     FOOT SURGERY Right 2015    HYSTERECTOMY      OTHER SURGICAL HISTORY      CYST FROM LEFT OVARY    SKIN BIOPSY      UPPER GASTROINTESTINAL ENDOSCOPY  06/02/2017    WISDOM TOOTH EXTRACTION           FAMILY HISTORY  Family History   Problem Relation Age of Onset    Arthritis Mother     Hypertension Mother     Migraines Mother     Other Father         arteriosclerotic cardiovascular disease    Stroke Father     Heart attack Father     Stroke Brother     Hypertension Other         sibling    Colon cancer Neg Hx          SOCIAL HISTORY  Social History     Socioeconomic History    Marital status:    Tobacco Use    Smoking status: Never     Passive exposure: Never    Smokeless tobacco: Never   Vaping Use    Vaping status: Never Used   Substance and Sexual Activity    Alcohol use: Yes     Alcohol/week: 4.0 standard drinks of alcohol     Types: 4 Glasses of wine per week     Comment: social    Drug use: Never    Sexual activity: Defer         ALLERGIES  Compazine [prochlorperazine], Mirapex [pramipexole], Requip [ropinirole], and Buspar [buspirone]        REVIEW OF SYSTEMS  Systems reviewed and negative      PHYSICAL EXAM    I have reviewed the triage vital signs and nursing notes.    ED Triage Vitals [08/03/24 1135]   Temp Heart Rate Resp BP SpO2   98.2 °F (36.8 °C) 76 16 151/95 100 %      Temp src Heart Rate Source Patient Position BP Location FiO2 (%)   Oral Monitor Sitting Left arm --       Physical Exam  HENT:      Head:      Comments: No visualized abrasion or laceration of the head or scalp  Eyes:      Extraocular Movements: Extraocular movements intact.   Cardiovascular:      Rate and Rhythm: Normal rate.   Pulmonary:      Comments: Right-sided chest wall tenderness to palpation no overlying deformity or contusion  Abdominal:      General: There is no  distension.      Palpations: Abdomen is soft.      Tenderness: There is no abdominal tenderness.   Musculoskeletal:      Cervical back: Neck supple. No tenderness.      Comments: Lumbar midline spinal tenderness no step-off or deformity no overlying bruising.  Right hip tenderness to palpation tolerates range of motion of the right hip no bruising noted.  She has pain with hip flexion.   Neurological:      General: No focal deficit present.      Mental Status: She is alert and oriented to person, place, and time.         LAB RESULTS  No results found for this or any previous visit (from the past 24 hour(s)).    If labs were ordered, I independently reviewed the results and considered them in treating the patient.        RADIOLOGY  CT Chest Without Contrast Diagnostic    Result Date: 8/3/2024  PROCEDURE: CT CHEST WO CONTRAST DIAGNOSTIC-  HISTORY: ground level fall now with right rib pain, right lower abd and hip pain, lumbar spine pain, eval bony frx from trauma  COMPARISON: July 2, 2023.  PROCEDURE: Axial images were obtained from the lung apex to the mid abdomen by computed tomography. This study was performed with techniques to keep radiation doses as low as reasonably achievable, (ALARA). Individualized dose reduction techniques using automated exposure control or adjustment of mA and/or kV according to the patient size were employed.  FINDINGS:  CHEST: There is no suspicious axillary adenopathy. There is no suspicious hilar or mediastinal adenopathy. The heart is proper size. There is no pericardial or pleural effusion.No suspicious infiltrate or nodule identified. A few linear densities identified in the lower lobes bilaterally likely atelectasis or scar. Limited images of the upper abdomen are unremarkable. No pneumothorax identified. Implants noted. No displaced rib fracture identified. No thoracic compression fracture or subluxation identified. No sternal fracture seen.      No pneumothorax or displaced rib  fracture seen.      CTDI: 6.86 mGy DLP:621.51 mGy.cm  This report was signed and finalized on 8/3/2024 1:01 PM by Yenifer Saleh MD.      CT Abdomen Pelvis Without Contrast    Result Date: 8/3/2024  PROCEDURE: CT ABDOMEN PELVIS WO CONTRAST-  HISTORY: ground level fall now with right rib pain, right lower abd and hip pain, lumbar spine pain, eval bony frx from trauma  COMPARISON: None.  PROCEDURE: Axial images were obtained from the lung bases to the pubic symphysis by computed tomography. This study was performed with techniques to keep radiation doses as low as reasonably achievable, (ALARA). Individualized dose reduction techniques using automated exposure control or adjustment of mA and/or kV according to the patient size were employed.  FINDINGS:  ABDOMEN: The lung bases are clear. The heart is proper size. The limited non-contrast images of the liver are unremarkable. There are metallic surgical clips in the right upper quadrant consistent with previous cholecystectomy. The spleen is unremarkable. No adrenal masses are seen. The aorta is normal in caliber. There is no significant free fluid or adenopathy.  There is no nephrolithiasis. There is no hydronephrosis.  PELVIS: The GI tract demonstrate no obstruction. The appendix is not identified. The urinary bladder is almost completely collapsed. Uterus is diminutive or absent. No free air identified. There is no fluid or adenopathy. No fracture identified.      No acute intra-abdominal process.     CTDI: 6.86 mGy DLP:621.51 mGy.cm  This report was signed and finalized on 8/3/2024 12:57 PM by Yenifer Saleh MD.       PROCEDURES    Procedures    No orders to display       MEDICATIONS GIVEN IN ER    Medications   HYDROcodone-acetaminophen (NORCO) 5-325 MG per tablet 1 tablet (1 tablet Oral Given 8/3/24 1229)         MEDICAL DECISION MAKING, PROGRESS, and CONSULTS    All labs, if obtained, have been independently reviewed by me.  All radiology studies, if obtained, have  been reviewed by me and the radiologist dictating the report.  All EKG's, if obtained, have been independently viewed and interpreted by me.      Discussion below represents my analysis of pertinent findings related to patient's condition, differential diagnosis, treatment plan and final disposition.                         Differential diagnosis:    Fracture, contusion, abrasion, laceration, rib fracture, hemothorax, pneumothorax, others.      Additional sources:    - Discussed/ obtained information from independent historians:      - External (non-ED) record review: Internal medicine progress note 7/15/2024    - Chronic or social conditions impacting care:      - Shared decision making:        Orders placed during this visit:  Orders Placed This Encounter   Procedures    CT Chest Without Contrast Diagnostic    CT Abdomen Pelvis Without Contrast         Additional orders considered but not ordered:      ED Course/MDM Discussion:    Patient is a 59-year-old female who presents after a ground-level fall that occurred yesterday.  She presents complaining of right-sided chest and abdominal/back pain.  Her vital signs are reassuring.  Her exam is suggestive of mostly musculoskeletal injuries.  CT imaging of the trunk were obtained given the associated areas of tenderness.  I appreciated no obvious hemo or pneumothorax on my interpretation of imaging.  No evidence of acute traumatic process on radiology interpretation of images of the chest abdomen and pelvis.  Patient was treated symptomatically.  Patient is well-appearing and stable and appropriate for outpatient management for musculoskeletal injuries.  Adverse medication effects, return precautions instructed.                Consultants:      Shared Decision Making:  After my consideration of clinical presentation and any laboratory/radiology studies obtained, I discussed the findings with the patient/patient representative who is in agreement with the treatment plan  and the final disposition.   Risks and benefits of discharge and/or observation/admission were discussed.         AS OF 14:49 EDT VITALS:    BP - 151/95  HR - 76  TEMP - 98.2 °F (36.8 °C) (Oral)  O2 SATS - 100%                  DIAGNOSIS  Final diagnoses:   Right-sided chest wall pain   Musculoskeletal strain   Acute low back pain without sciatica, unspecified back pain laterality         DISPOSITION  ED Disposition       ED Disposition   Discharge    Condition   Stable    Comment   --                   Please note that portions of this document were completed with voice recognition software.        Harley Chavarria MD  08/03/24 5632

## 2024-08-06 ENCOUNTER — OFFICE VISIT (OUTPATIENT)
Dept: BEHAVIORAL HEALTH | Facility: CLINIC | Age: 59
End: 2024-08-06
Payer: OTHER GOVERNMENT

## 2024-08-06 VITALS
DIASTOLIC BLOOD PRESSURE: 78 MMHG | BODY MASS INDEX: 21.53 KG/M2 | HEIGHT: 64 IN | SYSTOLIC BLOOD PRESSURE: 118 MMHG | WEIGHT: 126.1 LBS

## 2024-08-06 DIAGNOSIS — F33.2 SEVERE EPISODE OF RECURRENT MAJOR DEPRESSIVE DISORDER, WITHOUT PSYCHOTIC FEATURES: Primary | ICD-10-CM

## 2024-08-06 DIAGNOSIS — F90.2 ADHD (ATTENTION DEFICIT HYPERACTIVITY DISORDER), COMBINED TYPE: ICD-10-CM

## 2024-08-06 DIAGNOSIS — F43.21 GRIEF: ICD-10-CM

## 2024-08-06 RX ORDER — DESVENLAFAXINE SUCCINATE 50 MG/1
50 TABLET, EXTENDED RELEASE ORAL DAILY
Qty: 30 TABLET | Refills: 1 | Status: SHIPPED | OUTPATIENT
Start: 2024-08-06
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
## 2024-08-06 NOTE — PATIENT INSTRUCTIONS
[email or web address]: online therapist directory    Magaly recommendations:  Veronika and Hoopla: free library access, including audiobooks and e-books  I Am: affirmations  Balance: mindfulness and meditation    Bilateral stimulation music: free on youtube and spotify    Book Recommendations:  Rushing Woman's Syndrome, Dr. Veronika Nava  How To Be The Love You Seek, Vanna Teran (follow the Holistic Psychologist)  Parenting a Teen Who Has Intense Emotions, Cesar Heck, Bello Dudley  Adult Children of Emotionally Immature ParentsPrem

## 2024-08-08 ENCOUNTER — OFFICE VISIT (OUTPATIENT)
Dept: BEHAVIORAL HEALTH | Facility: CLINIC | Age: 59
End: 2024-08-08
Payer: OTHER GOVERNMENT

## 2024-08-08 DIAGNOSIS — F43.9 TRAUMA AND STRESSOR-RELATED DISORDER: ICD-10-CM

## 2024-08-08 DIAGNOSIS — F33.2 SEVERE EPISODE OF RECURRENT MAJOR DEPRESSIVE DISORDER, WITHOUT PSYCHOTIC FEATURES: Primary | ICD-10-CM

## 2024-08-08 DIAGNOSIS — F43.21 GRIEF: ICD-10-CM

## 2024-08-08 PROBLEM — F90.2 ADHD (ATTENTION DEFICIT HYPERACTIVITY DISORDER), COMBINED TYPE: Status: ACTIVE | Noted: 2024-08-08

## 2024-08-08 PROCEDURE — 90834 PSYTX W PT 45 MINUTES: CPT | Performed by: COUNSELOR

## 2024-08-12 ENCOUNTER — OFFICE VISIT (OUTPATIENT)
Dept: INTERNAL MEDICINE | Facility: CLINIC | Age: 59
End: 2024-08-12
Payer: OTHER GOVERNMENT

## 2024-08-12 VITALS
HEART RATE: 74 BPM | DIASTOLIC BLOOD PRESSURE: 92 MMHG | WEIGHT: 126 LBS | RESPIRATION RATE: 20 BRPM | SYSTOLIC BLOOD PRESSURE: 144 MMHG | OXYGEN SATURATION: 100 % | TEMPERATURE: 97.8 F | BODY MASS INDEX: 21.51 KG/M2 | HEIGHT: 64 IN

## 2024-08-12 DIAGNOSIS — M62.838 MUSCLE SPASM: ICD-10-CM

## 2024-08-12 DIAGNOSIS — I10 BENIGN ESSENTIAL HYPERTENSION: Primary | ICD-10-CM
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
## 2024-08-12 DIAGNOSIS — R07.81 RIB PAIN ON RIGHT SIDE: ICD-10-CM

## 2024-08-12 DIAGNOSIS — E53.8 VITAMIN B12 DEFICIENCY: ICD-10-CM

## 2024-08-12 PROCEDURE — 99214 OFFICE O/P EST MOD 30 MIN: CPT | Performed by: INTERNAL MEDICINE

## 2024-08-12 PROCEDURE — 96372 THER/PROPH/DIAG INJ SC/IM: CPT | Performed by: INTERNAL MEDICINE

## 2024-08-12 RX ORDER — METHOCARBAMOL 500 MG/1
500 TABLET, FILM COATED ORAL 3 TIMES DAILY
Qty: 30 TABLET | Refills: 3 | Status: SHIPPED | OUTPATIENT
Start: 2024-08-12

## 2024-08-12 RX ORDER — METHYLPREDNISOLONE 4 MG/1
TABLET ORAL
Qty: 21 TABLET | Refills: 0 | Status: SHIPPED | OUTPATIENT
Start: 2024-08-12

## 2024-08-12 RX ORDER — METHYLPREDNISOLONE SODIUM SUCCINATE 125 MG/2ML
125 INJECTION, POWDER, LYOPHILIZED, FOR SOLUTION INTRAMUSCULAR; INTRAVENOUS ONCE
Status: COMPLETED | OUTPATIENT
Start: 2024-08-12 | End: 2024-08-12

## 2024-08-12 RX ADMIN — CYANOCOBALAMIN 1000 MCG: 1000 INJECTION, SOLUTION INTRAMUSCULAR; SUBCUTANEOUS at 15:57

## 2024-08-12 RX ADMIN — METHYLPREDNISOLONE SODIUM SUCCINATE 125 MG: 125 INJECTION, POWDER, LYOPHILIZED, FOR SOLUTION INTRAMUSCULAR; INTRAVENOUS at 15:56

## 2024-08-12 NOTE — PROGRESS NOTES
"Chief Complaint  Hospital Follow Up Visit ([facility] ER 8/3/2024 ), Fall, and Pain    Subjective        Ernestina Medina presents to [facility]  HPI: Patient is here to follow up on the blood pressure  The patient is taking the blood pressure medications as prescribed and has had no side effects.  The patient is also here to follow up on ER visit at [facility]  on 8/3/2024 , after a fall and she hit the right side of her chest , her er records ,  Lab reports  and Radiology reports have been reviewed  Today and medications reconciled and updated  , she continues to have pain in the right rib area , her father passed away recently  Hypertension   Pertinent negatives include no chest pain, palpitations or shortness of breath.   Fall    Pain        Objective   Vital Signs:  /92   Pulse 74   Temp 97.8 °F (36.6 °C)   Resp 20   Ht 162.6 cm (64.02\")   Wt 57.2 kg (126 lb)   SpO2 100%   BMI 21.62 kg/m²   Estimated body mass index is 21.62 kg/m² as calculated from the following:    Height as of this encounter: 162.6 cm (64.02\").    Weight as of this encounter: 57.2 kg (126 lb).       BMI is within normal parameters. No other follow-up for BMI required.      Physical Exam  Vitals and nursing note reviewed.   Constitutional:       General: She is not in acute distress.     Appearance: Normal appearance. She is not diaphoretic.   HENT:      Head: Normocephalic and atraumatic.      Right Ear: External ear normal.      Left Ear: External ear normal.      Nose: Nose normal.   Eyes:      Extraocular Movements: Extraocular movements intact.      Conjunctiva/sclera: Conjunctivae normal.   Neck:      Trachea: Trachea normal.   Cardiovascular:      Rate and Rhythm: Normal rate and regular rhythm.      Heart sounds: Normal heart sounds.   Pulmonary:      Effort: Pulmonary effort is normal. No respiratory distress.      Breath sounds: Normal breath sounds.   Abdominal:      " General: Abdomen is flat.   Musculoskeletal:         General: Tenderness present.      Cervical back: Neck supple.      Comments: Tenderness on palpation right lower rib margin  Moves all limbs   Skin:     General: Skin is warm.      Findings: Bruising present.   Neurological:      Mental Status: She is alert and oriented to person, place, and time.      Comments: No gross motor or sensory deficits        Result Review :    The following data was reviewed by: Uma Anne MD on 08/12/2024:  CMP          11/27/2023    11:34 5/15/2024    10:03 7/12/2024    13:32   CMP   Glucose 90  89  85    BUN 18  16  21    Creatinine 0.58  0.69  0.68    Sodium 137  140  140    Potassium 4.5  4.5  4.8    Chloride 102  104  106    Calcium 9.6  9.9  9.5    Total Protein 7.4  7.0  6.5    Albumin 5.1  4.8  4.5    Globulin 2.3  2.2  2.0    Total Bilirubin 0.5  0.4  0.3    Alkaline Phosphatase 83  80  70    AST (SGOT) 25  27  22    ALT (SGPT) 21  26  17    BUN/Creatinine Ratio 31.0  23.2  30.9      CBC          11/27/2023    11:34 5/15/2024    10:03 7/12/2024    13:32   CBC   WBC 4.33  4.00  4.51    RBC 4.32  4.35  3.85    Hemoglobin 13.6  13.8  12.2    Hematocrit 40.8  41.2  37.5    MCV 94.4  94.7  97.4    MCH 31.5  31.7  31.7    MCHC 33.3  33.5  32.5    RDW 11.8  11.7  12.8    Platelets 277  257  267        CT Chest Without Contrast Diagnostic (08/03/2024 12:25)   ED with Harley Chavarria MD (08/03/2024)          Assessment and Plan     Diagnoses and all orders for this visit:    1. Benign essential hypertension (Primary)    2. Rib pain on right side  -     methylPREDNISolone sodium succinate (SOLU-Medrol) injection 125 mg  -     methylPREDNISolone (MEDROL) 4 MG dose pack; Take as directed on package instructions.  Dispense: 21 tablet; Refill: 0    3. Muscle spasm  -     methocarbamol (ROBAXIN) 500 MG tablet; Take 1 tablet by mouth 3 (Three) Times a Day.  Dispense: 30 tablet; Refill: 3    4. Fall, initial encounter  -     MRI Brain  Without Contrast    5. Vitamin B12 deficiency      Plan:  1.  Benign essential hypertension: Will continue current medication, low-sodium diet advised, Counseled to regularly check BP at home with goal averaging <130/80.   2.  Right rib pain: IM Solu-Medrol given in office today, start Medrol Dosepak  3.  Muscle spasm: As needed muscle relaxant  4.  Falls: Patient has had recurrent falls,  Will obtain MRI       Follow Up     No follow-ups on file.  Patient was given instructions and counseling regarding her condition or for health maintenance advice. Please see specific information pulled into the AVS if appropriate.
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
## 2024-08-22 ENCOUNTER — OFFICE VISIT (OUTPATIENT)
Dept: BEHAVIORAL HEALTH | Facility: CLINIC | Age: 59
End: 2024-08-22
Payer: OTHER GOVERNMENT

## 2024-08-22 DIAGNOSIS — F43.9 TRAUMA AND STRESSOR-RELATED DISORDER: ICD-10-CM

## 2024-08-22 DIAGNOSIS — F33.2 SEVERE EPISODE OF RECURRENT MAJOR DEPRESSIVE DISORDER, WITHOUT PSYCHOTIC FEATURES: ICD-10-CM

## 2024-08-22 DIAGNOSIS — F41.1 GENERALIZED ANXIETY DISORDER: Primary | ICD-10-CM

## 2024-08-22 DIAGNOSIS — F43.21 GRIEF: ICD-10-CM
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
## 2024-08-23 ENCOUNTER — PATIENT ROUNDING (BHMG ONLY) (OUTPATIENT)
Dept: INTERNAL MEDICINE | Facility: CLINIC | Age: 59
End: 2024-08-23
Payer: OTHER GOVERNMENT

## 2024-08-23 NOTE — PROGRESS NOTES
A [employer] message has been sent to patient for PATIENT ROUNDING with [facility].

## 2024-08-27 ENCOUNTER — HOSPITAL ENCOUNTER (OUTPATIENT)
Dept: MRI IMAGING | Facility: HOSPITAL | Age: 59
Discharge: HOME | End: 2024-08-27
Admitting: INTERNAL MEDICINE
Payer: OTHER GOVERNMENT

## 2024-08-27 PROCEDURE — 72141 MRI NECK SPINE W/O DYE: CPT

## 2024-09-05 ENCOUNTER — OFFICE VISIT (OUTPATIENT)
Dept: BEHAVIORAL HEALTH | Facility: CLINIC | Age: 59
End: 2024-09-05
Payer: OTHER GOVERNMENT

## 2024-09-05 DIAGNOSIS — F41.1 GENERALIZED ANXIETY DISORDER: Primary | ICD-10-CM

## 2024-09-05 DIAGNOSIS — F43.9 TRAUMA AND STRESSOR-RELATED DISORDER: ICD-10-CM

## 2024-09-05 PROCEDURE — 90832 PSYTX W PT 30 MINUTES: CPT | Performed by: COUNSELOR
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
## 2024-09-12 ENCOUNTER — OFFICE VISIT (OUTPATIENT)
Dept: NEUROSURGERY | Facility: CLINIC | Age: 59
End: 2024-09-12
Payer: OTHER GOVERNMENT

## 2024-09-12 VITALS — BODY MASS INDEX: 21.85 KG/M2 | TEMPERATURE: 98 F | WEIGHT: 128 LBS | HEIGHT: 64 IN

## 2024-09-12 DIAGNOSIS — M50.30 DDD (DEGENERATIVE DISC DISEASE), CERVICAL: Primary | ICD-10-CM

## 2024-09-12 DIAGNOSIS — M79.2 NEURALGIA: Primary | ICD-10-CM

## 2024-09-12 RX ORDER — MIRTAZAPINE 15 MG/1
15 TABLET, FILM COATED ORAL NIGHTLY
COMMUNITY
Start: 2024-09-01

## 2024-09-12 NOTE — PROGRESS NOTES
"Patient: Ernestina Mckinneyenhotilio  : 1965    Primary Care Provider: Uma Anne MD    Requesting Provider: As above      Chief Complaint: Neck pain    History of Present Illness: This is a 59 y.o. female who presents with several years of posterior neck pain.  Patient states she has been dealing with this for quite some time, but has never had any physical therapy or injections in her neck.  She denies having any numbness or tingling that starts in her neck and radiates down the arms, but she says she will sometimes get a tingling around her right elbow.  She has previously been evaluated by a hand surgeon and underwent an injection of the median nerve.  There was no evidence of ulnar neuropathy on her EMG.  In talking to the patient, she has dealt with multiple other symptoms which have come and gone over the past several years.  She has had some forgetfulness, numbness and tingling of the lower back and left toe numbness.  She is scheduled to go undergo a brain MRI in the near future.    PMHX  Allergies:  Allergies   Allergen Reactions    Compazine [Prochlorperazine] Other (See Comments)     \"IT MAKES MY JAW LOCK-UP\"    Mirapex [Pramipexole] Nausea And Vomiting    Requip [Ropinirole] Dizziness     Syncope ?    Buspar [Buspirone] Nausea And Vomiting     Medications    Current Outpatient Medications:     acetaminophen (TYLENOL) 500 MG tablet, Take 1 tablet by mouth Every 6 (Six) Hours As Needed for Mild Pain. prn, Disp: , Rfl:     albuterol sulfate  (90 Base) MCG/ACT inhaler, Inhale 1 puff Every 6 (Six) Hours As Needed for Wheezing., Disp: 18 g, Rfl: 3    ALPRAZolam (XANAX) 1 MG tablet, Take 1 tablet by mouth 2 (Two) Times a Day As Needed for Anxiety., Disp: 60 tablet, Rfl: 4    amLODIPine (Norvasc) 5 MG tablet, Take 1 tablet by mouth Every Night., Disp: 90 tablet, Rfl: 1    desvenlafaxine (PRISTIQ) 50 MG 24 hr tablet, Take 1 tablet by mouth Daily., Disp: 30 tablet, Rfl: 1    methocarbamol " (ROBAXIN) 500 MG tablet, Take 1 tablet by mouth 3 (Three) Times a Day., Disp: 30 tablet, Rfl: 3    mirtazapine (REMERON) 15 MG tablet, Take 1 tablet by mouth Every Night., Disp: , Rfl:     ondansetron (ZOFRAN) 4 MG tablet, Take 1 tablet by mouth Every 8 (Eight) Hours As Needed for Nausea or Vomiting., Disp: 30 tablet, Rfl: 1    naloxone (NARCAN) 4 MG/0.1ML nasal spray, Call 911. Don't prime. [place] in 1 nostril for overdose. Repeat in 2-3 minutes in other nostril if no or minimal breathing/responsiveness. (Patient not taking: Reported on 8/6/2024), Disp: 2 each, Rfl: 0    Current Facility-Administered Medications:     cyanocobalamin injection 1,000 mcg, 1,000 mcg, Intramuscular, Q28 Days, Uma Anne MD, 1,000 mcg at 08/12/24 1557  Past Medical History:  Past Medical History:   Diagnosis Date    Abdominal pain     Anxiety     Arthritis 2012    Asthma     Brain concussion     CTS (carpal tunnel syndrome) 2024    Depression     Elbow pain     Endometriosis     Fatigue     H/O seasonal allergies     Hand fracture, right     Herpes labialis     Hypertension     Joint pain of ankle and foot     Leukopenia     Migraine     Ovarian cyst     Pain in left knee     Pain in limb     Paresthesia     Pernicious anemia     Plantar fasciitis     Polycystic ovarian syndrome     PONV (postoperative nausea and vomiting)     Recurrent aphthous stomatitis     Tattoo     Vitamin B12 deficiency     Wrist fracture, right      Past Surgical History:  Past Surgical History:   Procedure Laterality Date    BREAST SURGERY  2009    BUNIONECTOMY Right 12/13/2021    Procedure: FIRST METATARSAL OSTEOTOMY  BUNIONECTOMY RIGHT FOOT WITH POSSIBLE ARTHROSURFACE JOINT REPLACEMENT RIGHT FOOT;  Surgeon: Prateek Martinez DPM;  Location: [facility];  Service: Podiatry;  Laterality: Right;    CHOLECYSTECTOMY  1990    ENDOSCOPY N/A 06/02/2017    Procedure: ESOPHAGOGASTRODUODENOSCOPY WITH BIOPSIES AND COLD BIOPSY POLYPECTOMY;  Surgeon: Yovany Pacheco,  MD;  Location: [facility] ENDOSCOPY;  Service:     EPIDURAL BLOCK      FOOT SURGERY Right 2015    HYSTERECTOMY      OTHER SURGICAL HISTORY      CYST FROM LEFT OVARY    SKIN BIOPSY      UPPER GASTROINTESTINAL ENDOSCOPY  06/02/2017    WISDOM TOOTH EXTRACTION       Social Hx:  Social History     Tobacco Use    Smoking status: Never     Passive exposure: Never    Smokeless tobacco: Never   Vaping Use    Vaping status: Never Used   Substance Use Topics    Alcohol use: Yes     Alcohol/week: 4.0 standard drinks of alcohol     Types: 4 Glasses of wine per week     Comment: social    Drug use: Never     Family Hx:  Family History   Problem Relation Age of Onset    Arthritis Mother     Hypertension Mother     Migraines Mother     Depression Mother     Hyperlipidemia Mother     Stroke Father     Heart attack Father     Heart disease Father     Vision loss Father     Stroke Brother     Hypertension Other         sibling    Anxiety disorder Daughter     Developmental Disability Daughter     Learning disabilities Daughter     Asthma Daughter     Cancer Maternal Aunt     Colon cancer Neg Hx      Review of Systems:        Review of Systems   Constitutional:  Negative for activity change, appetite change, chills, diaphoresis, fatigue, fever and unexpected weight change.   HENT:  Negative for congestion, dental problem, drooling, ear discharge, ear pain, facial swelling, hearing loss, mouth sores, nosebleeds, postnasal drip, rhinorrhea, sinus pressure, sinus pain, sneezing, sore throat, tinnitus, trouble swallowing and voice change.    Eyes:  Negative for photophobia, pain, discharge, redness, itching and visual disturbance.   Respiratory:  Negative for apnea, cough, choking, chest tightness, shortness of breath, wheezing and stridor.    Cardiovascular:  Negative for chest pain, palpitations and leg swelling.   Gastrointestinal:  Negative for abdominal distention, abdominal pain, anal bleeding, blood in stool, constipation, diarrhea,  "nausea, rectal pain and vomiting.   Endocrine: Negative for cold intolerance, heat intolerance, polydipsia, polyphagia and polyuria.   Genitourinary:  Negative for decreased urine volume, difficulty urinating, dysuria, enuresis, flank pain, frequency, genital sores, hematuria and urgency.   Musculoskeletal:  Positive for gait problem and neck pain. Negative for arthralgias, back pain, joint swelling, myalgias and neck stiffness.   Skin:  Negative for color change, pallor, rash and wound.   Allergic/Immunologic: Negative for environmental allergies, food allergies and immunocompromised state.   Neurological:  Positive for numbness (tingling sensation on multiple areas of body). Negative for dizziness, tremors, seizures, syncope, facial asymmetry, speech difficulty, weakness, light-headedness and headaches.   Hematological:  Negative for adenopathy. Does not bruise/bleed easily.   Psychiatric/Behavioral:  Negative for agitation, behavioral problems, confusion, decreased concentration, dysphoric mood, hallucinations, self-injury, sleep disturbance and suicidal ideas. The patient is not nervous/anxious and is not hyperactive.         Memory loss   All other systems reviewed and are negative.       Physical Exam:   Temp 98 °F (36.7 °C) (Infrared)   Ht 162.6 cm (64\")   Wt 58.1 kg (128 lb)   BMI 21.97 kg/m²   Awake, alert and oriented x 3  Speech f/c  Opens eyes spont  Pupils 3 mm rx bilaterally  Extraocular muscles intact bilaterally  Normal sensation to light touch in all 3 distributions of CN V bilaterally  Face symmetric bilaterally  Tongue midline  5/5 in all 4 ext  Normal sensation to light touch in all 4 ext  2+DTR's  No bolaños's or clonus bilaterally  No pronator drift or dysmetria  Gait not assessed    Diagnostic Studies:  All neurological imaging studies were independently reviewed unless stated otherwise    Assessment/Plan:  This is a 59 y.o. female presenting for evaluation of chronic cervical neck pain.  " In reviewing the patient's cervical MRI, she has very mild degenerative disc disease, most notably at C5-6 but there is no significant central neuroforaminal stenosis.  Clinically, the patient is not having any symptoms of radiculopathy or myelopathy and she has no evidence of myelopathy on exam.  I explained the patient that there is no role for surgical intervention given the benign findings of her MRI and the lack of radicular symptoms.  I also do not think her other intermittent symptoms are related to her cervical spine.  I am going to place a referral to physical therapy and pain management.  I explained to the patient if she were to develop symptoms of radiculopathy, she should give us a call, but at this point, we will not schedule any further follow-up.    Diagnoses and all orders for this visit:    1. DDD (degenerative disc disease), cervical (Primary)  -     Ambulatory Referral to Physical Therapy for Evaluation & Treatment  -     Ambulatory Referral to Pain Management Clinic      Ernestina Orellana Adam  reports that she has never smoked. She has never been exposed to tobacco smoke. She has never used smokeless tobacco.     Jacoby Roche MD  09/12/24  09:27 EDT

## 2024-09-16 ENCOUNTER — HOSPITAL ENCOUNTER (OUTPATIENT)
Dept: MRI IMAGING | Facility: HOSPITAL | Age: 59
Discharge: HOME | End: 2024-09-16
Admitting: INTERNAL MEDICINE
Payer: OTHER GOVERNMENT

## 2024-09-16 ENCOUNTER — CLINICAL SUPPORT (OUTPATIENT)
Dept: INTERNAL MEDICINE | Facility: CLINIC | Age: 59
End: 2024-09-16
Payer: OTHER GOVERNMENT

## 2024-09-16 ENCOUNTER — TELEPHONE (OUTPATIENT)
Dept: INTERNAL MEDICINE | Facility: CLINIC | Age: 59
End: 2024-09-16

## 2024-09-16 PROCEDURE — 96372 THER/PROPH/DIAG INJ SC/IM: CPT | Performed by: INTERNAL MEDICINE

## 2024-09-16 PROCEDURE — 70551 MRI BRAIN STEM W/O DYE: CPT

## 2024-09-16 RX ADMIN — CYANOCOBALAMIN 1000 MCG: 1000 INJECTION, SOLUTION INTRAMUSCULAR; SUBCUTANEOUS at 14:18

## 2024-09-17 ENCOUNTER — OFFICE VISIT (OUTPATIENT)
Dept: BEHAVIORAL HEALTH | Facility: CLINIC | Age: 59
End: 2024-09-17
Payer: OTHER GOVERNMENT

## 2024-09-17 VITALS
SYSTOLIC BLOOD PRESSURE: 132 MMHG | DIASTOLIC BLOOD PRESSURE: 80 MMHG | HEIGHT: 64 IN | WEIGHT: 128.2 LBS | BODY MASS INDEX: 21.89 KG/M2

## 2024-09-17 DIAGNOSIS — F33.2 SEVERE EPISODE OF RECURRENT MAJOR DEPRESSIVE DISORDER, WITHOUT PSYCHOTIC FEATURES: ICD-10-CM

## 2024-09-17 DIAGNOSIS — F90.2 ADHD (ATTENTION DEFICIT HYPERACTIVITY DISORDER), COMBINED TYPE: Primary | ICD-10-CM

## 2024-09-17 PROCEDURE — 96127 BRIEF EMOTIONAL/BEHAV ASSMT: CPT

## 2024-09-17 PROCEDURE — 99214 OFFICE O/P EST MOD 30 MIN: CPT

## 2024-09-17 RX ORDER — DESVENLAFAXINE 50 MG/1
50 TABLET, FILM COATED, EXTENDED RELEASE ORAL DAILY
Qty: 90 TABLET | Refills: 1 | Status: SHIPPED | OUTPATIENT
Start: 2024-09-17

## 2024-09-17 RX ORDER — DEXTROAMPHETAMINE SACCHARATE, AMPHETAMINE ASPARTATE, DEXTROAMPHETAMINE SULFATE AND AMPHETAMINE SULFATE 2.5; 2.5; 2.5; 2.5 MG/1; MG/1; MG/1; MG/1
10 TABLET ORAL 2 TIMES DAILY
Qty: 60 TABLET | Refills: 0 | Status: SHIPPED | OUTPATIENT
Start: 2024-09-17

## 2024-10-02 ENCOUNTER — HOSPITAL ENCOUNTER (OUTPATIENT)
Dept: GENERAL RADIOLOGY | Facility: HOSPITAL | Age: 59
Discharge: HOME | End: 2024-10-02
Payer: OTHER GOVERNMENT

## 2024-10-02 ENCOUNTER — LAB (OUTPATIENT)
Dept: LAB | Facility: HOSPITAL | Age: 59
End: 2024-10-02
Payer: OTHER GOVERNMENT

## 2024-10-02 ENCOUNTER — OFFICE VISIT (OUTPATIENT)
Dept: NEUROLOGY | Facility: CLINIC | Age: 59
End: 2024-10-02
Payer: OTHER GOVERNMENT

## 2024-10-02 VITALS
DIASTOLIC BLOOD PRESSURE: 72 MMHG | RESPIRATION RATE: 14 BRPM | HEART RATE: 83 BPM | HEIGHT: 64 IN | BODY MASS INDEX: 20.35 KG/M2 | WEIGHT: 119.2 LBS | SYSTOLIC BLOOD PRESSURE: 118 MMHG | TEMPERATURE: 97.7 F | OXYGEN SATURATION: 99 %

## 2024-10-02 DIAGNOSIS — R20.2 PARESTHESIA: ICD-10-CM

## 2024-10-02 DIAGNOSIS — H53.8 BLURRED VISION: ICD-10-CM

## 2024-10-02 DIAGNOSIS — G25.81 RESTLESS LEGS SYNDROME (RLS): ICD-10-CM

## 2024-10-02 DIAGNOSIS — M54.16 LUMBAR BACK PAIN WITH RADICULOPATHY AFFECTING RIGHT LOWER EXTREMITY: ICD-10-CM

## 2024-10-02 DIAGNOSIS — M54.16 LUMBAR BACK PAIN WITH RADICULOPATHY AFFECTING RIGHT LOWER EXTREMITY: Primary | ICD-10-CM

## 2024-10-02 LAB
25(OH)D3 SERPL-MCNC: 36.5 NG/ML (ref 30–100)
ALBUMIN SERPL-MCNC: 4.9 G/DL (ref 3.5–5.2)
ALBUMIN/GLOB SERPL: 1.9 G/DL
ALP SERPL-CCNC: 115 U/L (ref 39–117)
ALT SERPL W P-5'-P-CCNC: 19 U/L (ref 1–33)
ANION GAP SERPL CALCULATED.3IONS-SCNC: 14.3 MMOL/L (ref 5–15)
AST SERPL-CCNC: 25 U/L (ref 1–32)
BASOPHILS # BLD AUTO: 0.01 10*3/MM3 (ref 0–0.2)
BASOPHILS NFR BLD AUTO: 0.2 % (ref 0–1.5)
BILIRUB SERPL-MCNC: 0.4 MG/DL (ref 0–1.2)
BUN SERPL-MCNC: 16 MG/DL (ref 6–20)
BUN/CREAT SERPL: 23.9 (ref 7–25)
CALCIUM SPEC-SCNC: 10.1 MG/DL (ref 8.6–10.5)
CHLORIDE SERPL-SCNC: 100 MMOL/L (ref 98–107)
CHOLEST SERPL-MCNC: 228 MG/DL (ref 0–200)
CO2 SERPL-SCNC: 24.7 MMOL/L (ref 22–29)
CREAT SERPL-MCNC: 0.67 MG/DL (ref 0.57–1)
DEPRECATED RDW RBC AUTO: 40.2 FL (ref 37–54)
EGFRCR SERPLBLD CKD-EPI 2021: 102.1 ML/MIN/1.73
EOSINOPHIL # BLD AUTO: 0.07 10*3/MM3 (ref 0–0.4)
EOSINOPHIL NFR BLD AUTO: 1.7 % (ref 0.3–6.2)
ERYTHROCYTE [DISTWIDTH] IN BLOOD BY AUTOMATED COUNT: 11.8 % (ref 12.3–15.4)
FOLATE SERPL-MCNC: 5.38 NG/ML (ref 4.78–24.2)
GLOBULIN UR ELPH-MCNC: 2.6 GM/DL
GLUCOSE SERPL-MCNC: 88 MG/DL (ref 65–99)
HBA1C MFR BLD: 5.2 % (ref 4.8–5.6)
HCT VFR BLD AUTO: 42.5 % (ref 34–46.6)
HDLC SERPL-MCNC: 94 MG/DL (ref 40–60)
HGB BLD-MCNC: 14.3 G/DL (ref 12–15.9)
IMM GRANULOCYTES # BLD AUTO: 0.01 10*3/MM3 (ref 0–0.05)
IMM GRANULOCYTES NFR BLD AUTO: 0.2 % (ref 0–0.5)
LDLC SERPL CALC-MCNC: 126 MG/DL (ref 0–100)
LDLC/HDLC SERPL: 1.32 {RATIO}
LYMPHOCYTES # BLD AUTO: 1.09 10*3/MM3 (ref 0.7–3.1)
LYMPHOCYTES NFR BLD AUTO: 27 % (ref 19.6–45.3)
MCH RBC QN AUTO: 32.1 PG (ref 26.6–33)
MCHC RBC AUTO-ENTMCNC: 33.6 G/DL (ref 31.5–35.7)
MCV RBC AUTO: 95.3 FL (ref 79–97)
MONOCYTES # BLD AUTO: 0.24 10*3/MM3 (ref 0.1–0.9)
MONOCYTES NFR BLD AUTO: 5.9 % (ref 5–12)
NEUTROPHILS NFR BLD AUTO: 2.62 10*3/MM3 (ref 1.7–7)
NEUTROPHILS NFR BLD AUTO: 65 % (ref 42.7–76)
NRBC BLD AUTO-RTO: 0 /100 WBC (ref 0–0.2)
PLATELET # BLD AUTO: 264 10*3/MM3 (ref 140–450)
PMV BLD AUTO: 10.4 FL (ref 6–12)
POTASSIUM SERPL-SCNC: 4 MMOL/L (ref 3.5–5.2)
PROT SERPL-MCNC: 7.5 G/DL (ref 6–8.5)
RBC # BLD AUTO: 4.46 10*6/MM3 (ref 3.77–5.28)
SODIUM SERPL-SCNC: 139 MMOL/L (ref 136–145)
TRIGL SERPL-MCNC: 48 MG/DL (ref 0–150)
TSH SERPL DL<=0.05 MIU/L-ACNC: 0.93 UIU/ML (ref 0.27–4.2)
VIT B12 BLD-MCNC: 851 PG/ML (ref 211–946)
VLDLC SERPL-MCNC: 8 MG/DL (ref 5–40)
WBC NRBC COR # BLD AUTO: 4.04 10*3/MM3 (ref 3.4–10.8)

## 2024-10-02 PROCEDURE — 86038 ANTINUCLEAR ANTIBODIES: CPT

## 2024-10-02 PROCEDURE — 84443 ASSAY THYROID STIM HORMONE: CPT

## 2024-10-02 PROCEDURE — 99214 OFFICE O/P EST MOD 30 MIN: CPT | Performed by: NURSE PRACTITIONER

## 2024-10-02 PROCEDURE — 83036 HEMOGLOBIN GLYCOSYLATED A1C: CPT

## 2024-10-02 PROCEDURE — 80053 COMPREHEN METABOLIC PANEL: CPT | Performed by: INTERNAL MEDICINE

## 2024-10-02 PROCEDURE — 85025 COMPLETE CBC W/AUTO DIFF WBC: CPT

## 2024-10-02 PROCEDURE — 83921 ORGANIC ACID SINGLE QUANT: CPT

## 2024-10-02 PROCEDURE — 82306 VITAMIN D 25 HYDROXY: CPT

## 2024-10-02 PROCEDURE — 72100 X-RAY EXAM L-S SPINE 2/3 VWS: CPT

## 2024-10-02 PROCEDURE — 82746 ASSAY OF FOLIC ACID SERUM: CPT

## 2024-10-02 PROCEDURE — 82607 VITAMIN B-12: CPT | Performed by: INTERNAL MEDICINE

## 2024-10-02 PROCEDURE — 80061 LIPID PANEL: CPT | Performed by: INTERNAL MEDICINE

## 2024-10-02 RX ORDER — GABAPENTIN 300 MG/1
300 CAPSULE ORAL 2 TIMES DAILY
Qty: 60 CAPSULE | Refills: 2 | Status: SHIPPED | OUTPATIENT
Start: 2024-10-02
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
## 2024-10-03 ENCOUNTER — OFFICE VISIT (OUTPATIENT)
Dept: BEHAVIORAL HEALTH | Facility: CLINIC | Age: 59
End: 2024-10-03
Payer: OTHER GOVERNMENT

## 2024-10-03 DIAGNOSIS — F43.21 GRIEF: ICD-10-CM

## 2024-10-03 DIAGNOSIS — F43.9 TRAUMA AND STRESSOR-RELATED DISORDER: ICD-10-CM

## 2024-10-03 DIAGNOSIS — F41.1 GENERALIZED ANXIETY DISORDER: ICD-10-CM

## 2024-10-03 DIAGNOSIS — F33.2 SEVERE EPISODE OF RECURRENT MAJOR DEPRESSIVE DISORDER, WITHOUT PSYCHOTIC FEATURES: Primary | ICD-10-CM

## 2024-10-03 LAB — ANA SER QL: NEGATIVE
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
## 2024-10-07 LAB — METHYLMALONATE SERPL-SCNC: 170 NMOL/L (ref 0–378)

## 2024-10-08 ENCOUNTER — PATIENT ROUNDING (BHMG ONLY) (OUTPATIENT)
Dept: NEUROLOGY | Facility: CLINIC | Age: 59
End: 2024-10-08
Payer: OTHER GOVERNMENT

## 2024-10-15 ENCOUNTER — OFFICE VISIT (OUTPATIENT)
Dept: BEHAVIORAL HEALTH | Facility: CLINIC | Age: 59
End: 2024-10-15
Payer: OTHER GOVERNMENT

## 2024-10-15 VITALS
SYSTOLIC BLOOD PRESSURE: 116 MMHG | DIASTOLIC BLOOD PRESSURE: 76 MMHG | BODY MASS INDEX: 20.01 KG/M2 | HEIGHT: 64 IN | WEIGHT: 117.2 LBS

## 2024-10-15 DIAGNOSIS — F90.2 ADHD (ATTENTION DEFICIT HYPERACTIVITY DISORDER), COMBINED TYPE: Primary | ICD-10-CM

## 2024-10-15 DIAGNOSIS — F33.2 SEVERE EPISODE OF RECURRENT MAJOR DEPRESSIVE DISORDER, WITHOUT PSYCHOTIC FEATURES: ICD-10-CM

## 2024-10-15 RX ORDER — DEXTROAMPHETAMINE SACCHARATE, AMPHETAMINE ASPARTATE, DEXTROAMPHETAMINE SULFATE AND AMPHETAMINE SULFATE 1.875; 1.875; 1.875; 1.875 MG/1; MG/1; MG/1; MG/1
7.5 TABLET ORAL DAILY
Qty: 30 TABLET | Refills: 0 | Status: SHIPPED | OUTPATIENT
Start: 2024-10-15

## 2024-10-15 RX ORDER — DEXTROAMPHETAMINE SACCHARATE, AMPHETAMINE ASPARTATE MONOHYDRATE, DEXTROAMPHETAMINE SULFATE AND AMPHETAMINE SULFATE 3.75; 3.75; 3.75; 3.75 MG/1; MG/1; MG/1; MG/1
15 CAPSULE, EXTENDED RELEASE ORAL EVERY MORNING
Qty: 30 CAPSULE | Refills: 0 | Status: SHIPPED | OUTPATIENT
Start: 2024-10-15

## 2024-10-15 NOTE — PROGRESS NOTES
"              Follow Up Office Visit      Date: 10/15/2024   Patient Name: Ernestina Medina  : 1965   MRN: [id number]     Referring Provider: Uma Anne MD    Chief Complaint:      ICD-10-CM ICD-9-CM   1. ADHD (attention deficit hyperactivity disorder), combined type  F90.2 314.01   2. Severe episode of recurrent major depressive disorder, without psychotic features  F33.2 296.33        History of Present Illness:   Ernestina Medina is a 59 y.o. female who is here today for follow up with medication management for ADHD and depression.  Patient states, \"I have not been doing well.  It has gone back to being hard to get out of bed, with so much pain in my neck and my back.  I cannot do anything.  I cannot go back to work, I still cannot exercise.\"  Her physical limitations make her feel very helpless, which is very difficult for her to adjust to; she is accustomed to being independent and being the caregiver for other people.  She has been receiving care for her back, and hopes she will start to feel better soon.  She reports that the addition of Adderall has been helpful, and she has felt less \"scattered,\" and more able to stay on task.  She reports slightly reduced anxiety, and that her thoughts are less overwhelming in general; however she still struggles to leave the house, for fear of something happening to her, or in anticipation of discomfort.  She feels grief and guilt over not having been able to go see her 's grave; but his burial location would involve an hour of driving, which she cannot physically tolerate at this time.  No SI/HI/psychotic/manic symptoms present, no adverse effects or side effects to current medications noted, and no issues with appetite or sleep reported.     Subjective     Review of Systems:   Review of Systems   Psychiatric/Behavioral:  Positive for decreased concentration, sleep disturbance, depressed mood and stress.        Screening Scores: " "  PHQ-9: 24 (last visit, 16)  ISIS-7: 21 (last visit, 20)    Medications:     Current Outpatient Medications:     albuterol sulfate  (90 Base) MCG/ACT inhaler, Inhale 1 puff Every 6 (Six) Hours As Needed for Wheezing., Disp: 18 g, Rfl: 3    ALPRAZolam (XANAX) 1 MG tablet, Take 1 tablet by mouth 2 (Two) Times a Day As Needed for Anxiety., Disp: 60 tablet, Rfl: 4    amLODIPine (Norvasc) 5 MG tablet, Take 1 tablet by mouth Every Night., Disp: 90 tablet, Rfl: 1    desvenlafaxine (PRISTIQ) 50 MG 24 hr tablet, Take 1 tablet by mouth Daily., Disp: 90 tablet, Rfl: 1    gabapentin (NEURONTIN) 300 MG capsule, Take 1 capsule by mouth 2 (Two) Times a Day., Disp: 60 capsule, Rfl: 2    methocarbamol (ROBAXIN) 500 MG tablet, Take 1 tablet by mouth 3 (Three) Times a Day., Disp: 30 tablet, Rfl: 3    mirtazapine (REMERON) 15 MG tablet, Take 1 tablet by mouth Every Night., Disp: , Rfl:     ondansetron (ZOFRAN) 4 MG tablet, Take 1 tablet by mouth Every 8 (Eight) Hours As Needed for Nausea or Vomiting., Disp: 30 tablet, Rfl: 1    acetaminophen (TYLENOL) 500 MG tablet, Take 1 tablet by mouth Every 6 (Six) Hours As Needed for Mild Pain. prn (Patient not taking: Reported on 10/15/2024), Disp: , Rfl:     amphetamine-dextroamphetamine (ADDERALL) 7.5 MG tablet, Take 1 tablet by mouth Daily., Disp: 30 tablet, Rfl: 0    amphetamine-dextroamphetamine XR (Adderall XR) 15 MG 24 hr capsule, Take 1 capsule by mouth Every Morning, Disp: 30 capsule, Rfl: 0    Current Facility-Administered Medications:     cyanocobalamin injection 1,000 mcg, 1,000 mcg, Intramuscular, Q28 Days, Uma Anne MD, 1,000 mcg at 09/16/24 1418    Allergies:   Allergies   Allergen Reactions    Compazine [Prochlorperazine] Other (See Comments)     \"IT MAKES MY JAW LOCK-UP\"    Mirapex [Pramipexole] Nausea And Vomiting    Requip [Ropinirole] Dizziness     Syncope ?    Buspar [Buspirone] Nausea And Vomiting       Results Reviewed: n/a     The following portion of the " "patient's history were reviewed and updated appropriately: allergies, current and past medications, family history, medical history and social history.    Objective     Vital Signs:   Vitals:    10/15/24 1432   BP: 116/76   Weight: 53.2 kg (117 lb 3.2 oz)   Height: 162.6 cm (64.02\")     Body mass index is 20.1 kg/m².     Mental Status Exam:   MENTAL STATUS EXAM   General Appearance:  Cleanly groomed and dressed  Eye Contact:  Good eye contact  Attitude:  Cooperative  Motor Activity:  Normal gait, posture  Muscle Strength:  Normal  Speech:  Normal rate, tone, volume  Language:  Spontaneous  Mood and affect:  Normal, pleasant, depressed and labile  Hopelessness:  2  Loneliness: 2  Thought Process:  Logical  Associations/ Thought Content:  No delusions  Hallucinations:  None  Suicidal Ideations:  Not present  Homicidal Ideation:  Not present  Sensorium:  Alert and clear  Orientation:  Person, place, situation and time  Immediate Recall, Recent, and Remote Memory:  Intact  Attention Span/ Concentration:  Easily distracted  Fund of Knowledge:  Appropriate for age and educational level  Intellectual Functioning:  Average range  Insight:  Good  Judgement:  Good  Reliability:  Good  Impulse Control:  Good        SUICIDE RISK ASSESSMENT/CSSRS:  1. Does patient have thoughts of suicide? no  2. Does patient have intent for suicide? no  3. Does patient have a current plan for suicide? no  4. History of suicide attempts: no  5. Family history of suicide or attempts: no  6. History of violent behaviors towards others or property or thoughts of committing suicide: no  7. History of sexual aggression toward others: no  8. Access to firearms or weapons: no    Labs Reviewed: n/a  UDS Reviewed: n/a  Chart since last visit reviewed: yes    Assessment / Plan    Quality Measures:  Tobacco cessation: Patient denies tobacco use. No tobacco cessation education necessary.    Depression (PHQ >9): Patient screened positive for depression with " a PHQ score of 24. Follow up recommendations include medication management, suicide risk assessment, continued screening score monitoring and supportive care.    Medication Considerations:  Benzo: n/a  Stimulants: CSA up to date and on file   MONY reviewed and appropriate.     Risk Assessment: Risk of self-harm acutely is low. Risk of self-harm chronically is also low, but could be further elevated in the event of treatment noncompliance and/or AODA.    Visit Diagnosis/Orders Placed This Visit:  Diagnoses and all orders for this visit:    1. ADHD (attention deficit hyperactivity disorder), combined type (Primary)  -     amphetamine-dextroamphetamine XR (Adderall XR) 15 MG 24 hr capsule; Take 1 capsule by mouth Every Morning  Dispense: 30 capsule; Refill: 0  -     amphetamine-dextroamphetamine (ADDERALL) 7.5 MG tablet; Take 1 tablet by mouth Daily.  Dispense: 30 tablet; Refill: 0    2. Severe episode of recurrent major depressive disorder, without psychotic features         Impression/Formulation:  Patient appeared alert and oriented.  Patient is voluntarily continuing to receive psychiatric care at [facility].   Patient is receptive to assistance with maintaining a stable lifestyle.  Patient presents with history of     ICD-10-CM ICD-9-CM   1. ADHD (attention deficit hyperactivity disorder), combined type  F90.2 314.01   2. Severe episode of recurrent major depressive disorder, without psychotic features  F33.2 296.33   .     Treatment Plan:   Continue Pristiq 50 mg nightly.  Continue mirtazapine 15 mg nightly.  Discontinue Adderall.  Start Adderall XR 15 mg daily.  Add Adderall 7.5 mg in the afternoon.  Patient still has supply of Xanax on hand; okay to take it on an as needed basis, but will not refill medication at this time.  Follow-up in 4 weeks.    Any medications prescribed have been sent electronically to [facility].    Patient will continue supportive psychotherapy  efforts and medications as indicated.  Discussed medication options and treatment plan of prescribed medication(s) as well as the risks, benefits, and potential side effects. Patient will contact this office, call 911 or present to the nearest emergency room should suicidal or homicidal ideations occur. Clinic will obtain release of information for current treatment team for continuity of care as needed. Patient ackowledged and verbally consented to continue with current treatment plan and was educated on the importance of compliance with treatment and follow-up appointments.     Follow Up:   Return in about 4 weeks (around 11/12/2024) for Medication Management.        JONI Stephenson  [facility]    This is electronically signed by JONI Stephenson  10/15/2024 13:49 EDT

## 2024-10-28 ENCOUNTER — CLINICAL SUPPORT (OUTPATIENT)
Dept: INTERNAL MEDICINE | Facility: CLINIC | Age: 59
End: 2024-10-28
Payer: OTHER GOVERNMENT

## 2024-10-28 PROCEDURE — 96372 THER/PROPH/DIAG INJ SC/IM: CPT | Performed by: INTERNAL MEDICINE

## 2024-10-28 RX ADMIN — CYANOCOBALAMIN 1000 MCG: 1000 INJECTION, SOLUTION INTRAMUSCULAR; SUBCUTANEOUS at 13:14

## 2024-11-07 ENCOUNTER — HOSPITAL ENCOUNTER (OUTPATIENT)
Dept: MRI IMAGING | Facility: HOSPITAL | Age: 59
Discharge: HOME | End: 2024-11-07
Admitting: NURSE PRACTITIONER
Payer: OTHER GOVERNMENT

## 2024-11-07 DIAGNOSIS — M54.16 LUMBAR BACK PAIN WITH RADICULOPATHY AFFECTING RIGHT LOWER EXTREMITY: ICD-10-CM

## 2024-11-07 PROCEDURE — 0 GADOBENATE DIMEGLUMINE 529 MG/ML SOLUTION: Performed by: NURSE PRACTITIONER

## 2024-11-07 PROCEDURE — 72158 MRI LUMBAR SPINE W/O & W/DYE: CPT

## 2024-11-07 PROCEDURE — A9577 INJ MULTIHANCE: HCPCS | Performed by: NURSE PRACTITIONER

## 2024-11-07 RX ADMIN — GADOBENATE DIMEGLUMINE 10 ML: 529 INJECTION, SOLUTION INTRAVENOUS at 12:41

## 2024-11-13 DIAGNOSIS — M54.16 LUMBAR BACK PAIN WITH RADICULOPATHY AFFECTING RIGHT LOWER EXTREMITY: Primary | ICD-10-CM

## 2024-11-18 ENCOUNTER — OFFICE VISIT (OUTPATIENT)
Dept: INTERNAL MEDICINE | Facility: CLINIC | Age: 59
End: 2024-11-18
Payer: OTHER GOVERNMENT

## 2024-11-18 VITALS
WEIGHT: 115 LBS | DIASTOLIC BLOOD PRESSURE: 85 MMHG | SYSTOLIC BLOOD PRESSURE: 127 MMHG | RESPIRATION RATE: 20 BRPM | BODY MASS INDEX: 19.63 KG/M2 | OXYGEN SATURATION: 100 % | HEIGHT: 64 IN | TEMPERATURE: 97.8 F | HEART RATE: 88 BPM

## 2024-11-18 DIAGNOSIS — E78.2 MIXED HYPERLIPIDEMIA: ICD-10-CM

## 2024-11-18 DIAGNOSIS — H53.132 SUDDEN LOSS OF VISION, LEFT: ICD-10-CM

## 2024-11-18 DIAGNOSIS — I10 BENIGN ESSENTIAL HYPERTENSION: Primary | ICD-10-CM

## 2024-11-18 DIAGNOSIS — M79.2 NEURALGIA: ICD-10-CM

## 2024-11-18 PROCEDURE — 99214 OFFICE O/P EST MOD 30 MIN: CPT | Performed by: INTERNAL MEDICINE

## 2024-11-18 RX ORDER — AMLODIPINE BESYLATE 5 MG/1
5 TABLET ORAL NIGHTLY
Start: 2024-11-18

## 2024-11-18 NOTE — PROGRESS NOTES
"Chief Complaint  Hypertension (Follow up visit) and Hyperlipidemia    Subjective        Ernestina Medina presents to [facility]    HPI: Patient is here to follow up on the blood pressure  The patient is taking the blood pressure medications as prescribed and has had no side effects. The patient is also here to follow up on the cholesterol and  had  lab work done .  The patient also needs refills on medications .  She sees psychiatry and is noted to be on adderall , she has lost weight further, she see pain clinic and is on neck injections, she also sees neurology and is on gabapentin, she has had several episodes of intermittent neuralgia and stated also she had sudden loss of vision  Hyperlipidemia   Pertinent negatives include no chest pain or shortness of breath.   Hypertension   Pertinent negatives include no chest pain, palpitations or shortness of breath.    Objective   Vital Signs:  /85   Pulse 88   Temp 97.8 °F (36.6 °C)   Resp 20   Ht 162.6 cm (64.02\")   Wt 52.2 kg (115 lb)   SpO2 100%   BMI 19.73 kg/m²   Estimated body mass index is 19.73 kg/m² as calculated from the following:    Height as of this encounter: 162.6 cm (64.02\").    Weight as of this encounter: 52.2 kg (115 lb).    BMI is within normal parameters. No other follow-up for BMI required.      Physical Exam  Vitals and nursing note reviewed.   Constitutional:       General: She is not in acute distress.     Appearance: Normal appearance. She is not diaphoretic.   HENT:      Head: Normocephalic and atraumatic.      Right Ear: External ear normal.      Left Ear: External ear normal.      Nose: Nose normal.   Eyes:      Extraocular Movements: Extraocular movements intact.      Conjunctiva/sclera: Conjunctivae normal.   Neck:      Trachea: Trachea normal.   Cardiovascular:      Rate and Rhythm: Normal rate and regular rhythm.      Heart sounds: Normal heart sounds.   Pulmonary:      Effort: " Pulmonary effort is normal. No respiratory distress.      Breath sounds: Normal breath sounds.   Abdominal:      General: Abdomen is flat.   Musculoskeletal:      Cervical back: Neck supple.      Comments: Moves all limbs   Skin:     General: Skin is warm.   Neurological:      Mental Status: She is alert and oriented to person, place, and time.      Comments: No gross motor or sensory deficits        Result Review :  The following data was reviewed by: Uma Anne MD on 11/18/2024:  Common labs          5/15/2024    10:03 7/12/2024    13:32 10/2/2024    10:06   Common Labs   Glucose 89  85  88    BUN 16  21  16    Creatinine 0.69  0.68  0.67    Sodium 140  140  139    Potassium 4.5  4.8  4.0    Chloride 104  106  100    Calcium 9.9  9.5  10.1    Total Protein 7.0  6.5     Albumin 4.8  4.5  4.9    Total Bilirubin 0.4  0.3  0.4    Alkaline Phosphatase 80  70  115    AST (SGOT) 27  22  25    ALT (SGPT) 26  17  19    WBC 4.00  4.51  4.04    Hemoglobin 13.8  12.2  14.3    Hematocrit 41.2  37.5  42.5    Platelets 257  267  264    Total Cholesterol   228    Triglycerides   48    HDL Cholesterol   94    LDL Cholesterol    126    Hemoglobin A1C   5.20                Assessment and Plan   Diagnoses and all orders for this visit:    1. Benign essential hypertension (Primary)  -     amLODIPine (Norvasc) 5 MG tablet; Take 1 tablet by mouth Every Night.    2. Mixed hyperlipidemia  -     CBC (No Diff)  -     Comprehensive Metabolic Panel  -     Lipid Panel    3. Sudden loss of vision, left  -     Ambulatory Referral to Neurology    4. Neuralgia  -     Ambulatory Referral to Neurology    Plan:  1.  Benign essential hypertension: Will continue current medication, low-sodium diet advised, Counseled to regularly check BP at home with goal averaging <130/80.   2.mixed hyperlipidemia: will obtain   fasting CMP and lipid panel.  Diet and exercise counseled,   3.  Sudden loss of vision: Will refer patient to neuro-ophthalmology at     4.  neuralgia: Will refer patient to neuro-ophthalmology at [facility]         Follow Up   Return in about 4 months (around 3/25/2025).  Patient was given instructions and counseling regarding her condition or for health maintenance advice. Please see specific information pulled into the AVS if appropriate.

## 2024-11-21 ENCOUNTER — PRIOR AUTHORIZATION (OUTPATIENT)
Dept: BEHAVIORAL HEALTH | Facility: CLINIC | Age: 59
End: 2024-11-21

## 2024-11-21 ENCOUNTER — OFFICE VISIT (OUTPATIENT)
Dept: BEHAVIORAL HEALTH | Facility: CLINIC | Age: 59
End: 2024-11-21
Payer: OTHER GOVERNMENT

## 2024-11-21 VITALS
HEIGHT: 64 IN | BODY MASS INDEX: 20.14 KG/M2 | WEIGHT: 118 LBS | SYSTOLIC BLOOD PRESSURE: 124 MMHG | DIASTOLIC BLOOD PRESSURE: 76 MMHG

## 2024-11-21 DIAGNOSIS — F90.2 ADHD (ATTENTION DEFICIT HYPERACTIVITY DISORDER), COMBINED TYPE: ICD-10-CM

## 2024-11-21 DIAGNOSIS — F33.2 SEVERE EPISODE OF RECURRENT MAJOR DEPRESSIVE DISORDER, WITHOUT PSYCHOTIC FEATURES: Primary | ICD-10-CM

## 2024-11-21 DIAGNOSIS — F41.1 GENERALIZED ANXIETY DISORDER: ICD-10-CM

## 2024-11-21 RX ORDER — LEVOMILNACIPRAN HYDROCHLORIDE 40 MG/1
40 CAPSULE, EXTENDED RELEASE ORAL DAILY
Qty: 30 CAPSULE | Refills: 1 | Status: SHIPPED | OUTPATIENT
Start: 2024-12-05

## 2024-11-21 RX ORDER — DEXTROAMPHETAMINE SACCHARATE, AMPHETAMINE ASPARTATE MONOHYDRATE, DEXTROAMPHETAMINE SULFATE AND AMPHETAMINE SULFATE 3.75; 3.75; 3.75; 3.75 MG/1; MG/1; MG/1; MG/1
15 CAPSULE, EXTENDED RELEASE ORAL EVERY MORNING
Qty: 30 CAPSULE | Refills: 0 | Status: SHIPPED | OUTPATIENT
Start: 2024-11-21

## 2024-11-21 RX ORDER — LEVOMILNACIPRAN HYDROCHLORIDE 20 MG/1
20 CAPSULE, EXTENDED RELEASE ORAL DAILY
Qty: 14 CAPSULE | Refills: 0 | Status: SHIPPED | OUTPATIENT
Start: 2024-11-21 | End: 2024-12-05

## 2024-11-21 RX ORDER — DEXTROAMPHETAMINE SACCHARATE, AMPHETAMINE ASPARTATE, DEXTROAMPHETAMINE SULFATE AND AMPHETAMINE SULFATE 1.875; 1.875; 1.875; 1.875 MG/1; MG/1; MG/1; MG/1
7.5 TABLET ORAL DAILY
Qty: 30 TABLET | Refills: 0 | Status: SHIPPED | OUTPATIENT
Start: 2024-11-21

## 2024-11-21 NOTE — PROGRESS NOTES
Follow Up Office Visit      Date: 2024   Patient Name: Ernestina Medina  : 1965   MRN: [id number]     Referring Provider: Uma Anne MD    Chief Complaint:      ICD-10-CM ICD-9-CM   1. Severe episode of recurrent major depressive disorder, without psychotic features  F33.2 296.33   2. ADHD (attention deficit hyperactivity disorder), combined type  F90.2 314.01   3. Generalized anxiety disorder  F41.1 300.02        History of Present Illness:   Ernestina Medina is a 59 y.o. female who is here today for follow up with medication management for ADHD, anxiety, and depression.  She reports that she is doing about the same, still feeling depressed and navigating both physical and emotional stressors.  Her doctor prescribed her gabapentin for pain, which may contribute to decreased anxiety as the medication takes effect.  She reports that the Adderall has been helpful; she is more able to complete tasks, less distracted, and has less anxiety about task completion and productivity.  No SI/HI/psychotic/manic symptoms present, no adverse effects or side effects to current medications noted, and no issues with appetite or sleep reported.     Subjective     Review of Systems:   Review of Systems   Psychiatric/Behavioral:  Positive for depressed mood and stress. The patient is nervous/anxious.        Screening Scores:   PHQ-9: 21 (last visit, 24)  ISIS-7: 19 (last visit, 21)    Medications:     Current Outpatient Medications:     albuterol sulfate  (90 Base) MCG/ACT inhaler, Inhale 1 puff Every 6 (Six) Hours As Needed for Wheezing., Disp: 18 g, Rfl: 3    ALPRAZolam (XANAX) 1 MG tablet, Take 1 tablet by mouth 2 (Two) Times a Day As Needed for Anxiety., Disp: 60 tablet, Rfl: 4    amLODIPine (Norvasc) 5 MG tablet, Take 1 tablet by mouth Every Night., Disp: , Rfl:     amphetamine-dextroamphetamine (ADDERALL) 7.5 MG tablet, Take 1 tablet by mouth Daily., Disp: 30 tablet, Rfl:  "0    amphetamine-dextroamphetamine XR (Adderall XR) 15 MG 24 hr capsule, Take 1 capsule by mouth Every Morning, Disp: 30 capsule, Rfl: 0    gabapentin (NEURONTIN) 300 MG capsule, Take 1 capsule by mouth 2 (Two) Times a Day., Disp: 60 capsule, Rfl: 2    methocarbamol (ROBAXIN) 500 MG tablet, Take 1 tablet by mouth 3 (Three) Times a Day., Disp: 30 tablet, Rfl: 3    ondansetron (ZOFRAN) 4 MG tablet, Take 1 tablet by mouth Every 8 (Eight) Hours As Needed for Nausea or Vomiting., Disp: 30 tablet, Rfl: 1    Levomilnacipran HCl ER (Fetzima) 20 MG capsule sustained-release 24 hr, Take 20 mg by mouth Daily for 14 days., Disp: 14 capsule, Rfl: 0    [START ON 12/5/2024] Levomilnacipran HCl ER (Fetzima) 40 MG capsule sustained-release 24 hr, Take 40 mg by mouth Daily., Disp: 30 capsule, Rfl: 1    Current Facility-Administered Medications:     cyanocobalamin injection 1,000 mcg, 1,000 mcg, Intramuscular, Q28 Days, Uma Anne MD, 1,000 mcg at 10/28/24 1314    Allergies:   Allergies   Allergen Reactions    Compazine [Prochlorperazine] Other (See Comments)     \"IT MAKES MY JAW LOCK-UP\"    Mirapex [Pramipexole] Nausea And Vomiting    Requip [Ropinirole] Dizziness     Syncope ?    Buspar [Buspirone] Nausea And Vomiting       Results Reviewed: n/a     The following portion of the patient's history were reviewed and updated appropriately: allergies, current and past medications, family history, medical history and social history.    Objective     Vital Signs:   Vitals:    11/21/24 1527   BP: 124/76   Weight: 53.5 kg (118 lb)   Height: 162.6 cm (64.02\")     Body mass index is 20.24 kg/m².     Mental Status Exam:   MENTAL STATUS EXAM   General Appearance:  Cleanly groomed and dressed  Eye Contact:  Good eye contact  Attitude:  Cooperative  Motor Activity:  Fidgety and normal gait, posture  Muscle Strength:  Normal  Speech:  Normal rate, tone, volume  Language:  Spontaneous  Mood and affect:  Normal, pleasant, anxious and " depressed  Hopelessness:  2  Loneliness: 2  Thought Process:  Logical  Associations/ Thought Content:  No delusions  Hallucinations:  None  Suicidal Ideations:  Not present  Homicidal Ideation:  Not present  Sensorium:  Alert and clear  Orientation:  Person, place, time and situation  Immediate Recall, Recent, and Remote Memory:  Intact  Attention Span/ Concentration:  Good  Fund of Knowledge:  Appropriate for age and educational level  Intellectual Functioning:  Average range  Insight:  Good  Judgement:  Good  Reliability:  Good  Impulse Control:  Good        SUICIDE RISK ASSESSMENT/CSSRS:  1. Does patient have thoughts of suicide? no  2. Does patient have intent for suicide? no  3. Does patient have a current plan for suicide? no  4. History of suicide attempts: no  5. Family history of suicide or attempts: no  6. History of violent behaviors towards others or property or thoughts of committing suicide: no  7. History of sexual aggression toward others: no  8. Access to firearms or weapons: no    Labs Reviewed: n/a  UDS Reviewed: n/a  Chart since last visit reviewed: yes    Assessment / Plan    Quality Measures:  Tobacco cessation: Patient denies tobacco use. No tobacco cessation education necessary.    Depression (PHQ >9): Patient screened positive for depression with a PHQ score of 21. Follow up recommendations include medication management, suicide risk assessment, continued screening score monitoring and supportive care.    Medication Considerations:  Benzo: CSA up to date and on file  Stimulants: CSA up to date and on file   MONY reviewed and appropriate.     Risk Assessment: Risk of self-harm acutely is low. Risk of self-harm chronically is also low, but could be further elevated in the event of treatment noncompliance and/or AODA.    Visit Diagnosis/Orders Placed This Visit:  Diagnoses and all orders for this visit:    1. Severe episode of recurrent major depressive disorder, without psychotic features  (Primary)  -     Levomilnacipran HCl ER (Fetzima) 40 MG capsule sustained-release 24 hr; Take 40 mg by mouth Daily.  Dispense: 30 capsule; Refill: 1  -     Levomilnacipran HCl ER (Fetzima) 20 MG capsule sustained-release 24 hr; Take 20 mg by mouth Daily for 14 days.  Dispense: 14 capsule; Refill: 0    2. ADHD (attention deficit hyperactivity disorder), combined type  -     amphetamine-dextroamphetamine (ADDERALL) 7.5 MG tablet; Take 1 tablet by mouth Daily.  Dispense: 30 tablet; Refill: 0  -     amphetamine-dextroamphetamine XR (Adderall XR) 15 MG 24 hr capsule; Take 1 capsule by mouth Every Morning  Dispense: 30 capsule; Refill: 0    3. Generalized anxiety disorder  -     Levomilnacipran HCl ER (Fetzima) 40 MG capsule sustained-release 24 hr; Take 40 mg by mouth Daily.  Dispense: 30 capsule; Refill: 1  -     Levomilnacipran HCl ER (Fetzima) 20 MG capsule sustained-release 24 hr; Take 20 mg by mouth Daily for 14 days.  Dispense: 14 capsule; Refill: 0         Impression/Formulation:  Patient appeared alert and oriented.  Patient is voluntarily continuing to receive psychiatric care at [facility].   Patient is receptive to assistance with maintaining a stable lifestyle.  Patient presents with history of     ICD-10-CM ICD-9-CM   1. Severe episode of recurrent major depressive disorder, without psychotic features  F33.2 296.33   2. ADHD (attention deficit hyperactivity disorder), combined type  F90.2 314.01   3. Generalized anxiety disorder  F41.1 300.02     Patient has been taking Pristiq for around 3 months now, and has not noticed a significant difference in anxiety/depression symptoms, or pain issues.  We will transition to another medication that can also help with pain    Treatment Plan:   Continue Adderall XR 15 mg daily, with Adderall 7.5 mg in the afternoon.  Start Fetzima 20 mg daily for 14 days; then increase to 40 mg daily.  Continue Pristiq for 2 more weeks, then stop.  Follow-up in 8  weeks.    Any medications prescribed have been sent electronically to [facility] in [place].     Patient will continue supportive psychotherapy efforts and medications as indicated.  Discussed medication options and treatment plan of prescribed medication(s) as well as the risks, benefits, and potential side effects. Patient will contact this office, call 911 or present to the nearest emergency room should suicidal or homicidal ideations occur. Clinic will obtain release of information for current treatment team for continuity of care as needed. Patient ackowledged and verbally consented to continue with current treatment plan and was educated on the importance of compliance with treatment and follow-up appointments.     Follow Up:   Return in about 8 weeks (around 1/16/2025) for Medication Management.        JONI Stephenson  [facility]    This is electronically signed by JONI Stephenson  11/21/2024 15:34 EST

## 2024-11-25 ENCOUNTER — CLINICAL SUPPORT (OUTPATIENT)
Dept: INTERNAL MEDICINE | Facility: CLINIC | Age: 59
End: 2024-11-25
Payer: OTHER GOVERNMENT

## 2024-11-25 DIAGNOSIS — J45.21 MILD INTERMITTENT ASTHMA WITH ACUTE EXACERBATION: ICD-10-CM

## 2024-11-25 RX ORDER — ALBUTEROL SULFATE 90 UG/1
1 INHALANT RESPIRATORY (INHALATION) EVERY 6 HOURS PRN
Qty: 8.5 G | Refills: 1 | Status: SHIPPED | OUTPATIENT
Start: 2024-11-25

## 2024-11-25 RX ADMIN — CYANOCOBALAMIN 1000 MCG: 1000 INJECTION, SOLUTION INTRAMUSCULAR; SUBCUTANEOUS at 15:07

## 2024-12-03 ENCOUNTER — OFFICE VISIT (OUTPATIENT)
Dept: BEHAVIORAL HEALTH | Facility: CLINIC | Age: 59
End: 2024-12-03
Payer: OTHER GOVERNMENT

## 2024-12-03 DIAGNOSIS — F41.1 GENERALIZED ANXIETY DISORDER: Primary | ICD-10-CM

## 2024-12-03 DIAGNOSIS — F33.1 MODERATE EPISODE OF RECURRENT MAJOR DEPRESSIVE DISORDER: ICD-10-CM

## 2024-12-03 DIAGNOSIS — F43.9 TRAUMA AND STRESSOR-RELATED DISORDER: ICD-10-CM

## 2024-12-17 ENCOUNTER — OFFICE VISIT (OUTPATIENT)
Dept: BEHAVIORAL HEALTH | Facility: CLINIC | Age: 59
End: 2024-12-17
Payer: OTHER GOVERNMENT

## 2024-12-17 DIAGNOSIS — F43.9 TRAUMA AND STRESSOR-RELATED DISORDER: ICD-10-CM

## 2024-12-17 DIAGNOSIS — F41.1 GENERALIZED ANXIETY DISORDER: Primary | ICD-10-CM

## 2024-12-17 DIAGNOSIS — F33.2 SEVERE EPISODE OF RECURRENT MAJOR DEPRESSIVE DISORDER, WITHOUT PSYCHOTIC FEATURES: ICD-10-CM
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
## 2024-12-30 DIAGNOSIS — F90.2 ADHD (ATTENTION DEFICIT HYPERACTIVITY DISORDER), COMBINED TYPE: ICD-10-CM

## 2024-12-30 RX ORDER — DEXTROAMPHETAMINE SACCHARATE, AMPHETAMINE ASPARTATE MONOHYDRATE, DEXTROAMPHETAMINE SULFATE AND AMPHETAMINE SULFATE 3.75; 3.75; 3.75; 3.75 MG/1; MG/1; MG/1; MG/1
15 CAPSULE, EXTENDED RELEASE ORAL EVERY MORNING
Qty: 30 CAPSULE | Refills: 0 | Status: SHIPPED | OUTPATIENT
Start: 2024-12-30

## 2024-12-30 NOTE — TELEPHONE ENCOUNTER
Incoming Refill Request      Medication requested (name and dose): Adderall XR 15mg     Pharmacy where request should be sent: [facility]    Additional details provided by patient: patient is completely out    Best call back number: verified    Does the patient have less than a 3 day supply:  [x] Yes  [] No    Molly Hernandez  12/30/24, 12:07 EST

## 2025-01-02 ENCOUNTER — OFFICE VISIT (OUTPATIENT)
Dept: NEUROLOGY | Facility: CLINIC | Age: 60
End: 2025-01-02
Payer: OTHER GOVERNMENT

## 2025-01-02 VITALS
BODY MASS INDEX: 20.49 KG/M2 | WEIGHT: 120 LBS | DIASTOLIC BLOOD PRESSURE: 76 MMHG | TEMPERATURE: 98.2 F | HEIGHT: 64 IN | SYSTOLIC BLOOD PRESSURE: 122 MMHG | RESPIRATION RATE: 14 BRPM

## 2025-01-02 DIAGNOSIS — G25.81 RESTLESS LEGS SYNDROME (RLS): ICD-10-CM

## 2025-01-02 DIAGNOSIS — M54.16 LUMBAR BACK PAIN WITH RADICULOPATHY AFFECTING RIGHT LOWER EXTREMITY: ICD-10-CM

## 2025-01-02 DIAGNOSIS — R20.2 PARESTHESIA: ICD-10-CM

## 2025-01-02 RX ORDER — GABAPENTIN 300 MG/1
300 CAPSULE ORAL 4 TIMES DAILY
Qty: 120 CAPSULE | Refills: 5 | Status: SHIPPED | OUTPATIENT
Start: 2025-01-02

## 2025-01-02 NOTE — PROGRESS NOTES
Follow Up Office Visit      Patient Name: Ernestina Medina  : 1965   MRN: [id number]     Chief Complaint:    Chief Complaint   Patient presents with   • Follow-up     RLS  Lumbar pain   • Results     MRI LUMBAR       History of Present Illness: Ernestina Medina is a 59 y.o. female who is here today to follow up with neurology for LBP, PN and RLS. She was last seen in clinic 10/24 (Am[facility]).     She was sent for XR lumbar spine and MRI and started on course of Gabapentin 300 mg BID and reports good tolerance and compliance with medication. Medication has helped with Cspine pain and PN. She also reports improved in RLS symptoms. She feels the medication wears off quickly and often jerks and kicks her legs while falling asleep. She is an established pt of Pain Mgmt ([facility]). She has been approved for injections to her C-Spine and Lumbar Spine. She does not feel she has time for PT.     Recent Imaging:      XR Spine Lumbar 10/24 + DDD  MRI Lumbar Spine  + Multilevel lumbar degenerative change with no areas of abnormal enhancement  EMG/NCS  + very mild sensorimotor primary demyelinating neuropathy affecting the right median nerve at or about the wrist  MRI Brain WO  - noncontributory   MRI Cervical Spine WO  + Mild annular disc bulge with mild bilateral neuroforaminal narrowing at C5-6     Pertinent Medical History: 1987 with TBI (?), Migraines, hyperlipidemia, B12 deficiency, GERD, pernicious anemia, anxiety, depression, ADHD, plantar fasciitis, neuralgia, asthma       Subjective      Review of Systems:   Review of Systems   Constitutional: Negative.    HENT: Negative.     Eyes: Negative.    Respiratory: Negative.     Cardiovascular: Negative.    Gastrointestinal: Negative.    Endocrine: Negative.    Genitourinary: Negative.    Musculoskeletal:  Positive for back pain.   Skin: Negative.    Allergic/Immunologic: Negative.    Neurological:  Positive for  "numbness.        RLS   Hematological: Negative.    Psychiatric/Behavioral: Negative.     All other systems reviewed and are negative.      I have reviewed and the following portions of the patient's history were updated as appropriate: past family history, past medical history, past social history, past surgical history and problem list.    Medications:     Current Outpatient Medications:   •  albuterol sulfate  (90 Base) MCG/ACT inhaler, INHALE 1 PUFF BY MOUTH EVERY 6 HOURS AS NEEDED FOR WHEEZING, Disp: 8.5 g, Rfl: 1  •  ALPRAZolam (XANAX) 1 MG tablet, Take 1 tablet by mouth 2 (Two) Times a Day As Needed for Anxiety., Disp: 60 tablet, Rfl: 4  •  amLODIPine (Norvasc) 5 MG tablet, Take 1 tablet by mouth Every Night., Disp: , Rfl:   •  amphetamine-dextroamphetamine (ADDERALL) 7.5 MG tablet, Take 1 tablet by mouth Daily., Disp: 30 tablet, Rfl: 0  •  amphetamine-dextroamphetamine XR (Adderall XR) 15 MG 24 hr capsule, Take 1 capsule by mouth Every Morning, Disp: 30 capsule, Rfl: 0  •  gabapentin (NEURONTIN) 300 MG capsule, Take 1 capsule by mouth 4 (Four) Times a Day., Disp: 120 capsule, Rfl: 5  •  Levomilnacipran HCl ER (Fetzima) 40 MG capsule sustained-release 24 hr, Take 40 mg by mouth Daily., Disp: 30 capsule, Rfl: 1  •  methocarbamol (ROBAXIN) 500 MG tablet, Take 1 tablet by mouth 3 (Three) Times a Day., Disp: 30 tablet, Rfl: 3  •  ondansetron (ZOFRAN) 4 MG tablet, Take 1 tablet by mouth Every 8 (Eight) Hours As Needed for Nausea or Vomiting., Disp: 30 tablet, Rfl: 1  •  Levomilnacipran HCl ER (Fetzima) 20 MG capsule sustained-release 24 hr, Take 20 mg by mouth Daily for 14 days., Disp: 14 capsule, Rfl: 0    Current Facility-Administered Medications:   •  cyanocobalamin injection 1,000 mcg, 1,000 mcg, Intramuscular, Q28 Days, Uma Anne MD, 1,000 mcg at 11/25/24 1507    Allergies:   Allergies   Allergen Reactions   • Compazine [Prochlorperazine] Other (See Comments)     \"IT MAKES MY JAW LOCK-UP\"   • " "Mirapex [Pramipexole] Nausea And Vomiting   • Requip [Ropinirole] Dizziness     Syncope ?   • Buspar [Buspirone] Nausea And Vomiting       Objective     Physical Exam:  Vital Signs:   Vitals:    01/02/25 1521   BP: 122/76   BP Location: Right arm   Patient Position: Sitting   Cuff Size: Adult   Resp: 14   Temp: 98.2 °F (36.8 °C)   TempSrc: Infrared   Weight: 54.4 kg (120 lb)   Height: 162.6 cm (64.02\")   PainSc:   2   PainLoc: Back     Body mass index is 20.59 kg/m².    Physical Exam  Vitals and nursing note reviewed.   Constitutional:       General: She is not in acute distress.     Appearance: Normal appearance.   HENT:      Head: Normocephalic.      Nose: Nose normal.      Mouth/Throat:      Mouth: Mucous membranes are moist.      Pharynx: Oropharynx is clear.   Eyes:      Extraocular Movements: Extraocular movements intact.      Conjunctiva/sclera: Conjunctivae normal.   Musculoskeletal:      Cervical back: Normal range of motion and neck supple.   Skin:     General: Skin is warm and dry.      Capillary Refill: Capillary refill takes less than 2 seconds.   Neurological:      Mental Status: She is alert and oriented to person, place, and time.   Psychiatric:         Mood and Affect: Mood normal.         Behavior: Behavior normal.         Neurological Exam  Mental Status  Alert. Oriented to person, place, and time.    Cranial Nerves  CN III, IV, VI: Extraocular movements intact bilaterally.       Assessment / Plan      Assessment/Plan:   Diagnoses and all orders for this visit:    1. Lumbar back pain with radiculopathy affecting right lower extremity  -     gabapentin (NEURONTIN) 300 MG capsule; Take 1 capsule by mouth 4 (Four) Times a Day.  Dispense: 120 capsule; Refill: 5  -     Ambulatory Referral to Pain Management    2. Restless legs syndrome (RLS)  -     gabapentin (NEURONTIN) 300 MG capsule; Take 1 capsule by mouth 4 (Four) Times a Day.  Dispense: 120 capsule; Refill: 5    3. Paresthesia  -     gabapentin " (NEURONTIN) 300 MG capsule; Take 1 capsule by mouth 4 (Four) Times a Day.  Dispense: 120 capsule; Refill: 5         Follow Up:   Return in about 6 months (around 7/2/2025), or if symptoms worsen or fail to improve.    Anticipatory guidance and safety reviewed  Patient education provided  [facility] #[id number] reviewed and appropriate  CDA-on file  Continue/ increase gabapentin 300 mg QID; SE reviewed  Pain Mgmt Referral for Lumbar Spine Pain   Declined offer of referral to PT/OT     RTC PRN or within 6 months or sooner with issues     Radha Mccoy, DNP, APRN, FNP-C  [facility]

## 2025-01-03 DIAGNOSIS — I10 BENIGN ESSENTIAL HYPERTENSION: ICD-10-CM

## 2025-01-03 NOTE — TELEPHONE ENCOUNTER
Rx Refill Note  Requested Prescriptions     Pending Prescriptions Disp Refills    amLODIPine (NORVASC) 5 MG tablet [Pharmacy Med Name: AMLODIPINE BESYLATE 5MG TABLETS] 90 tablet      Sig: TAKE 1 TABLET BY MOUTH DAILY      Last office visit with prescribing clinician: 11/18/2024   Last telemedicine visit with prescribing clinician: Visit date not found   Next office visit with prescribing clinician: 3/26/2025                         Would you like a call back once the refill request has been completed: [] Yes [] No    If the office needs to give you a call back, can they leave a voicemail: [] Yes [] No    Gordon Manzo MA  01/03/25, 11:15 EST

## 2025-01-04 RX ORDER — AMLODIPINE BESYLATE 5 MG/1
5 TABLET ORAL DAILY
Qty: 90 TABLET | Refills: 0 | Status: SHIPPED | OUTPATIENT
Start: 2025-01-04

## 2025-01-08 ENCOUNTER — TELEPHONE (OUTPATIENT)
Dept: INTERNAL MEDICINE | Facility: CLINIC | Age: 60
End: 2025-01-08
Payer: OTHER GOVERNMENT

## 2025-01-08 DIAGNOSIS — Z12.11 COLON CANCER SCREENING: Primary | ICD-10-CM
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
## 2025-01-08 NOTE — TELEPHONE ENCOUNTER
Caller: Ernestina Medina    Relationship: Self    Best call back number: [phone]       What specialty or service is being requested: GASTROENTEROLOGY      Any additional details: PATIENT STATES THAT SHE HAS HAD POLYPS REMOVED BEFORE AND THINKS THAT THEY ARE BACK

## 2025-01-09 ENCOUNTER — OFFICE VISIT (OUTPATIENT)
Dept: BEHAVIORAL HEALTH | Facility: CLINIC | Age: 60
End: 2025-01-09
Payer: OTHER GOVERNMENT

## 2025-01-09 ENCOUNTER — CLINICAL SUPPORT (OUTPATIENT)
Dept: INTERNAL MEDICINE | Facility: CLINIC | Age: 60
End: 2025-01-09
Payer: OTHER GOVERNMENT

## 2025-01-09 DIAGNOSIS — F33.1 MODERATE EPISODE OF RECURRENT MAJOR DEPRESSIVE DISORDER: Primary | ICD-10-CM

## 2025-01-09 DIAGNOSIS — F41.1 GENERALIZED ANXIETY DISORDER: ICD-10-CM

## 2025-01-09 DIAGNOSIS — F43.9 TRAUMA AND STRESSOR-RELATED DISORDER: ICD-10-CM

## 2025-01-09 PROCEDURE — 96372 THER/PROPH/DIAG INJ SC/IM: CPT | Performed by: INTERNAL MEDICINE

## 2025-01-09 RX ADMIN — CYANOCOBALAMIN 1000 MCG: 1000 INJECTION, SOLUTION INTRAMUSCULAR; SUBCUTANEOUS at 15:06

## 2025-01-16 ENCOUNTER — OFFICE VISIT (OUTPATIENT)
Dept: BEHAVIORAL HEALTH | Facility: CLINIC | Age: 60
End: 2025-01-16
Payer: OTHER GOVERNMENT

## 2025-01-16 VITALS
SYSTOLIC BLOOD PRESSURE: 128 MMHG | DIASTOLIC BLOOD PRESSURE: 80 MMHG | HEIGHT: 64 IN | WEIGHT: 114.8 LBS | BODY MASS INDEX: 19.6 KG/M2

## 2025-01-16 DIAGNOSIS — F33.2 SEVERE EPISODE OF RECURRENT MAJOR DEPRESSIVE DISORDER, WITHOUT PSYCHOTIC FEATURES: Primary | ICD-10-CM

## 2025-01-16 DIAGNOSIS — F90.2 ADHD (ATTENTION DEFICIT HYPERACTIVITY DISORDER), COMBINED TYPE: ICD-10-CM

## 2025-01-16 NOTE — PROGRESS NOTES
"              Follow Up Office Visit      Date: 2025   Patient Name: Ernestina Medina  : 1965   MRN: [id number]     Referring Provider: Uma Anne MD    Chief Complaint:      ICD-10-CM ICD-9-CM   1. Severe episode of recurrent major depressive disorder, without psychotic features  F33.2 296.33   2. ADHD (attention deficit hyperactivity disorder), combined type  F90.2 314.01        History of Present Illness:   Ernestina Medina is a 59 y.o. female who is here today for follow up with medication management for ADHD and depression.  She reports that she has had increased stressors lately, including health issues with her daughter, her own physical struggles, and her cat essentially being sent home on hospice.  She reports that the extended release Adderall with the afternoon short acting Adderall seems to be very effective at helping her remain focused and productive throughout her workday.  She continues to have feelings of anxiety and depression in response to her stressors, feeling overwhelmed and reporting \"brain fog.\"  She has transitioned entirely from Pristiq to Fetzima 40 mg, and has not noticed any negative effects, though her depression is not fully controlled.  Her neurologist prescribed gabapentin, which seems to have been helpful for controlling her anxiety as well.  No SI/HI/psychotic/manic symptoms present, no adverse effects or side effects to current medications noted, and no issues with appetite or sleep reported.      Subjective     Review of Systems:   Review of Systems   Psychiatric/Behavioral:  Positive for decreased concentration, depressed mood and stress.        Screening Scores:   PHQ-9: 19 (last visit, 21)  ISIS-7: 21 (last visit, 19)    Medications:     Current Outpatient Medications:     albuterol sulfate  (90 Base) MCG/ACT inhaler, INHALE 1 PUFF BY MOUTH EVERY 6 HOURS AS NEEDED FOR WHEEZING, Disp: 8.5 g, Rfl: 1    ALPRAZolam (XANAX) 1 MG tablet, " "Take 1 tablet by mouth 2 (Two) Times a Day As Needed for Anxiety., Disp: 60 tablet, Rfl: 4    amLODIPine (NORVASC) 5 MG tablet, TAKE 1 TABLET BY MOUTH DAILY, Disp: 90 tablet, Rfl: 0    amphetamine-dextroamphetamine (ADDERALL) 7.5 MG tablet, Take 1 tablet by mouth Daily., Disp: 30 tablet, Rfl: 0    amphetamine-dextroamphetamine XR (Adderall XR) 15 MG 24 hr capsule, Take 1 capsule by mouth Every Morning, Disp: 30 capsule, Rfl: 0    gabapentin (NEURONTIN) 300 MG capsule, Take 1 capsule by mouth 4 (Four) Times a Day., Disp: 120 capsule, Rfl: 5    methocarbamol (ROBAXIN) 500 MG tablet, Take 1 tablet by mouth 3 (Three) Times a Day., Disp: 30 tablet, Rfl: 3    ondansetron (ZOFRAN) 4 MG tablet, Take 1 tablet by mouth Every 8 (Eight) Hours As Needed for Nausea or Vomiting., Disp: 30 tablet, Rfl: 1    Levomilnacipran HCl ER 80 MG capsule sustained-release 24 hr, Take 80 mg by mouth Daily., Disp: 30 capsule, Rfl: 2    Current Facility-Administered Medications:     cyanocobalamin injection 1,000 mcg, 1,000 mcg, Intramuscular, Q28 Days, Uma Anne MD, 1,000 mcg at 01/09/25 1506    Allergies:   Allergies   Allergen Reactions    Compazine [Prochlorperazine] Other (See Comments)     \"IT MAKES MY JAW LOCK-UP\"    Mirapex [Pramipexole] Nausea And Vomiting    Requip [Ropinirole] Dizziness     Syncope ?    Buspar [Buspirone] Nausea And Vomiting       Results Reviewed: n/a     The following portion of the patient's history were reviewed and updated appropriately: allergies, current and past medications, family history, medical history and social history.    Objective     Vital Signs:   Vitals:    01/16/25 1540   BP: 128/80   Weight: 52.1 kg (114 lb 12.8 oz)   Height: 162.6 cm (64.02\")     Body mass index is 19.69 kg/m².     Mental Status Exam:   MENTAL STATUS EXAM   General Appearance:  Cleanly groomed and dressed  Eye Contact:  Good eye contact  Attitude:  Cooperative  Motor Activity:  Normal gait, posture  Muscle Strength:  " Normal  Speech:  Normal rate, tone, volume  Language:  Spontaneous  Mood and affect:  Normal, pleasant and depressed  Hopelessness:  Denies  Loneliness: Denies  Thought Process:  Logical  Associations/ Thought Content:  No delusions  Hallucinations:  None  Suicidal Ideations:  Not present  Homicidal Ideation:  Not present  Sensorium:  Clear and alert  Orientation:  Person, place, situation and time  Immediate Recall, Recent, and Remote Memory:  Intact  Attention Span/ Concentration:  Good  Fund of Knowledge:  Appropriate for age and educational level  Intellectual Functioning:  Average range  Insight:  Good  Judgement:  Good  Reliability:  Good  Impulse Control:  Good        SUICIDE RISK ASSESSMENT/CSSRS:  1. Does patient have thoughts of suicide? no  2. Does patient have intent for suicide? no  3. Does patient have a current plan for suicide? no  4. History of suicide attempts: no  5. Family history of suicide or attempts: no  6. History of violent behaviors towards others or property or thoughts of committing suicide: no  7. History of sexual aggression toward others: no  8. Access to firearms or weapons: no    Labs Reviewed: n/a  UDS Reviewed: n/a  Chart since last visit reviewed: yes    Assessment / Plan    Quality Measures:  Tobacco cessation: Patient denies tobacco use. No tobacco cessation education necessary.      Depression (PHQ >9): Patient screened positive for depression with a PHQ score of 21. Follow up recommendations include medication management, suicide risk assessment, continued screening score monitoring and supportive care.     Medication Considerations:  Benzo: CSA up to date and on file   Stimulants: CSA up to date and on file    MONY reviewed and appropriate.     Risk Assessment: Risk of self-harm acutely is low. Risk of self-harm chronically is also low, but could be further elevated in the event of treatment noncompliance and/or AODA.    Visit Diagnosis/Orders Placed This Visit:  Diagnoses  and all orders for this visit:    1. Severe episode of recurrent major depressive disorder, without psychotic features (Primary)  -     Levomilnacipran HCl ER 80 MG capsule sustained-release 24 hr; Take 80 mg by mouth Daily.  Dispense: 30 capsule; Refill: 2    2. ADHD (attention deficit hyperactivity disorder), combined type         Impression/Formulation:  Patient appeared alert and oriented.  Patient is voluntarily continuing to receive psychiatric care at [facility].   Patient is receptive to assistance with maintaining a stable lifestyle.  Patient presents with history of     ICD-10-CM ICD-9-CM   1. Severe episode of recurrent major depressive disorder, without psychotic features  F33.2 296.33   2. ADHD (attention deficit hyperactivity disorder), combined type  F90.2 314.01   .     Treatment Plan:   Increase Fetzima ER to 80 mg daily.  Continue Adderall XR 15 mg in the morning, with Adderall 7.5 mg in the afternoon.  Patient has Xanax 1 mg up to twice daily on hand, but does not need to refill the prescription at this time.  Follow-up in 8 weeks.    Any medications prescribed have been sent electronically to [facility] in [place].    Patient will continue supportive psychotherapy efforts and medications as indicated.  Discussed medication options and treatment plan of prescribed medication(s) as well as the risks, benefits, and potential side effects. Patient will contact this office, call 911 or present to the nearest emergency room should suicidal or homicidal ideations occur. Clinic will obtain release of information for current treatment team for continuity of care as needed. Patient ackowledged and verbally consented to continue with current treatment plan and was educated on the importance of compliance with treatment and follow-up appointments.     Follow Up:   Return in about 8 weeks (around 3/13/2025) for Medication Management.        JONI Stephenson  [facility]    This is electronically signed by Anne-Marie Kovacs, JONI  01/16/2025 15:53 EST

## 2025-02-05 ENCOUNTER — OFFICE VISIT (OUTPATIENT)
Dept: GASTROENTEROLOGY | Facility: CLINIC | Age: 60
End: 2025-02-05
Payer: OTHER GOVERNMENT

## 2025-02-05 VITALS
BODY MASS INDEX: 20.24 KG/M2 | SYSTOLIC BLOOD PRESSURE: 118 MMHG | WEIGHT: 118 LBS | DIASTOLIC BLOOD PRESSURE: 82 MMHG | HEART RATE: 85 BPM

## 2025-02-05 DIAGNOSIS — F90.2 ADHD (ATTENTION DEFICIT HYPERACTIVITY DISORDER), COMBINED TYPE: ICD-10-CM

## 2025-02-05 DIAGNOSIS — K92.1 MELENA: Chronic | ICD-10-CM

## 2025-02-05 DIAGNOSIS — R11.0 NAUSEA: Chronic | ICD-10-CM

## 2025-02-05 DIAGNOSIS — Z12.11 ENCOUNTER FOR SCREENING FOR MALIGNANT NEOPLASM OF COLON: ICD-10-CM

## 2025-02-05 DIAGNOSIS — R10.13 EPIGASTRIC PAIN: Primary | Chronic | ICD-10-CM

## 2025-02-05 RX ORDER — DEXTROAMPHETAMINE SACCHARATE, AMPHETAMINE ASPARTATE MONOHYDRATE, DEXTROAMPHETAMINE SULFATE AND AMPHETAMINE SULFATE 3.75; 3.75; 3.75; 3.75 MG/1; MG/1; MG/1; MG/1
15 CAPSULE, EXTENDED RELEASE ORAL EVERY MORNING
Qty: 30 CAPSULE | Refills: 0 | Status: SHIPPED | OUTPATIENT
Start: 2025-02-05

## 2025-02-05 RX ORDER — PANTOPRAZOLE SODIUM 40 MG/1
TABLET, DELAYED RELEASE ORAL
Qty: 30 TABLET | Refills: 3 | Status: SHIPPED | OUTPATIENT
Start: 2025-02-05

## 2025-02-05 RX ORDER — SODIUM CHLORIDE 9 MG/ML
70 INJECTION, SOLUTION INTRAVENOUS CONTINUOUS PRN
Status: CANCELLED | OUTPATIENT
Start: 2025-02-05 | End: 2025-02-06

## 2025-02-05 NOTE — PATIENT INSTRUCTIONS
Antireflux measures: Avoid fried, fatty foods, alcohol, chocolate, coffee, tea,  soft drinks, all carbonated beverages (including sparkling water), peppermint and spearmint, spicy foods, tomatoes and tomato based foods, onions, peppers, and garlic.   Other antireflux measures include weight reduction if overweight, avoiding tight clothing around the abdomen, elevating the head of the bed 6 inches with blocks under the head board, and don't drink or eat before going to bed and avoid lying down immediately after meals.  Pantoprazole 40 mg 1 by mouth in the am 30 minutes before breakfast.  Zofran 4 mg 1 po every 8 hours as needed for nausea.  Avoid all NSAIDs, including Ibuprofen, Motrin, Advil, Aleve, Naprosyn, Mobic, Diclofenac, etc.   May use Tylenol/Acetaminophen products if needed.   CBC  Patient wishes to do Cologuard test instead of colonoscopy at this time.   Upper endoscopy-EGD: The indications, technique, alternatives and potential risk and complications were discussed with the patient including but not limited to bleeding, perforations, missing lesions and anesthetic complications. The patient understands and wishes to proceed with the procedure and has given their verbal consent. Written patient education information was given to the patient.   The patient will call if they have further questions before procedure.

## 2025-02-05 NOTE — PROGRESS NOTES
New Patient Consult      Date: 2025   Patient Name: Ernestina Medina  MRN: [id number]  : 1965     Primary Care Provider: Uma Anne MD    Chief Complaint   Patient presents with    Black or Bloody Stool    Abdominal Pain     2025  History of Present Illness  The patient is a 59-year-old female who is here for evaluation of abdominal pain and black stools.    Approximately 4 weeks ago, she began experiencing persistent upper abdominal pain, nausea, and frequent vomiting. Additionally, she has observed episodes of melena, occurring at least once a week. She does not use NSAIDs such as ibuprofen, Motrin, Advil, Aleve, Mobic, or diclofenac. She also reports fatigue.  She does not report any reflux or dysphagia. She has not taken anything for her symptoms.    She has no history of constipation, diarrhea, or hematochezia. She has never undergone a colonoscopy and is currently seeking insurance approval for the Cologuard.    Her last EGD was in 2017 by Dr. Pacheco. No family history of GI malignancy.      Subjective      Past Medical History:   Diagnosis Date    Abdominal pain     Anxiety     Arthritis     Asthma     Brain concussion     CTS (carpal tunnel syndrome)     Depression     Elbow pain     Endometriosis     Fatigue     H/O seasonal allergies     Hand fracture, right     Herpes labialis     Hypertension     Joint pain of ankle and foot     Leukopenia     Migraine     Ovarian cyst     Pain in left knee     Pain in limb     Paresthesia     Pernicious anemia     Plantar fasciitis     Polycystic ovarian syndrome     PONV (postoperative nausea and vomiting)     Recurrent aphthous stomatitis     Tattoo     Vitamin B12 deficiency     Wrist fracture, right      Past Surgical History:   Procedure Laterality Date    BREAST SURGERY  2009    BUNIONECTOMY Right 2021    Procedure: FIRST METATARSAL OSTEOTOMY  BUNIONECTOMY RIGHT FOOT WITH POSSIBLE ARTHROSURFACE JOINT REPLACEMENT  RIGHT FOOT;  Surgeon: Prateek Martinez DPM;  Location: [facility] OR;  Service: Podiatry;  Laterality: Right;    CHOLECYSTECTOMY  1990    ENDOSCOPY N/A 06/02/2017    Procedure: ESOPHAGOGASTRODUODENOSCOPY WITH BIOPSIES AND COLD BIOPSY POLYPECTOMY;  Surgeon: Yovany Pacheco MD;  Location: [facility] ENDOSCOPY;  Service:     EPIDURAL BLOCK      FOOT SURGERY Right 2015    HYSTERECTOMY      OTHER SURGICAL HISTORY      CYST FROM LEFT OVARY    SKIN BIOPSY      UPPER GASTROINTESTINAL ENDOSCOPY  06/02/2017    WISDOM TOOTH EXTRACTION       Family History   Problem Relation Age of Onset    Arthritis Mother     Hypertension Mother     Migraines Mother     Depression Mother     Hyperlipidemia Mother     Stroke Father     Heart attack Father     Heart disease Father     Vision loss Father     Multiple sclerosis Father     Dementia Father     Stroke Brother     Cancer Maternal Aunt     Anxiety disorder Daughter     Developmental Disability Daughter     Learning disabilities Daughter     Asthma Daughter     Hypertension Other         sibling    Colon cancer Neg Hx      Social History     Socioeconomic History    Marital status:    Tobacco Use    Smoking status: Never     Passive exposure: Never    Smokeless tobacco: Never   Vaping Use    Vaping status: Never Used   Substance and Sexual Activity    Alcohol use: Yes     Alcohol/week: 4.0 standard drinks of alcohol     Types: 4 Glasses of wine per week     Comment: social    Drug use: Never    Sexual activity: Not Currently     Birth control/protection: Hysterectomy       Current Outpatient Medications:     albuterol sulfate  (90 Base) MCG/ACT inhaler, INHALE 1 PUFF BY MOUTH EVERY 6 HOURS AS NEEDED FOR WHEEZING, Disp: 8.5 g, Rfl: 1    ALPRAZolam (XANAX) 1 MG tablet, Take 1 tablet by mouth 2 (Two) Times a Day As Needed for Anxiety., Disp: 60 tablet, Rfl: 4    amLODIPine (NORVASC) 5 MG tablet, TAKE 1 TABLET BY MOUTH DAILY, Disp: 90 tablet, Rfl: 0     "amphetamine-dextroamphetamine (ADDERALL) 7.5 MG tablet, Take 1 tablet by mouth Daily., Disp: 30 tablet, Rfl: 0    amphetamine-dextroamphetamine XR (Adderall XR) 15 MG 24 hr capsule, Take 1 capsule by mouth Every Morning, Disp: 30 capsule, Rfl: 0    gabapentin (NEURONTIN) 300 MG capsule, Take 1 capsule by mouth 4 (Four) Times a Day., Disp: 120 capsule, Rfl: 5    Levomilnacipran HCl ER 80 MG capsule sustained-release 24 hr, Take 80 mg by mouth Daily., Disp: 30 capsule, Rfl: 2    methocarbamol (ROBAXIN) 500 MG tablet, Take 1 tablet by mouth 3 (Three) Times a Day., Disp: 30 tablet, Rfl: 3    ondansetron (ZOFRAN) 4 MG tablet, Take 1 tablet by mouth Every 8 (Eight) Hours As Needed for Nausea or Vomiting., Disp: 30 tablet, Rfl: 1    pantoprazole (PROTONIX) 40 MG EC tablet, 1 po daily in the am 30 minutes before breakfast, Disp: 30 tablet, Rfl: 3    Current Facility-Administered Medications:     cyanocobalamin injection 1,000 mcg, 1,000 mcg, Intramuscular, Q28 Days, Uma Anne MD, 1,000 mcg at 01/09/25 1506     Allergies   Allergen Reactions    Compazine [Prochlorperazine] Other (See Comments)     \"IT MAKES MY JAW LOCK-UP\"    Mirapex [Pramipexole] Nausea And Vomiting    Requip [Ropinirole] Dizziness     Syncope ?    Buspar [Buspirone] Nausea And Vomiting     The following portions of the patient's history were reviewed and updated as appropriate: allergies, current medications, past family history, past medical history, past social history, past surgical history and problem list.    Objective     Physical Exam  Vitals and nursing note reviewed.   Constitutional:       General: She is not in acute distress.     Appearance: Normal appearance. She is well-developed.   HENT:      Head: Normocephalic and atraumatic.      Mouth/Throat:      Mouth: Mucous membranes are not pale, not dry and not cyanotic.   Eyes:      General: Lids are normal.   Neck:      Trachea: Trachea normal.   Cardiovascular:      Rate and Rhythm: Normal " rate.   Pulmonary:      Effort: Pulmonary effort is normal. No respiratory distress.      Breath sounds: Normal breath sounds.   Abdominal:      General: Bowel sounds are normal.      Palpations: Abdomen is soft.      Tenderness: There is abdominal tenderness in the epigastric area.   Skin:     General: Skin is warm and dry.   Neurological:      Mental Status: She is alert and oriented to person, place, and time.   Psychiatric:         Mood and Affect: Mood normal.         Speech: Speech normal.         Behavior: Behavior normal. Behavior is cooperative.       Vitals:    02/05/25 1017   BP: 118/82   Pulse: 85   Weight: 53.5 kg (118 lb)     Body mass index is 20.24 kg/m².     Results Review:   I have reviewed the patient's new clinical and imaging results.    No visits with results within 90 Day(s) from this visit.   Latest known visit with results is:   Lab on 10/02/2024   Component Date Value Ref Range Status    25 Hydroxy, Vitamin D 10/02/2024 36.5  30.0 - 100.0 ng/ml Final    Folate 10/02/2024 5.38  4.78 - 24.20 ng/mL Final    Methylmalonic Acid 10/02/2024 170  0 - 378 nmol/L Final    ZAHRAA Direct 10/02/2024 Negative  Negative Final    TSH 10/02/2024 0.925  0.270 - 4.200 uIU/mL Final    Hemoglobin A1C 10/02/2024 5.20  4.80 - 5.60 % Final    WBC 10/02/2024 4.04  3.40 - 10.80 10*3/mm3 Final    RBC 10/02/2024 4.46  3.77 - 5.28 10*6/mm3 Final    Hemoglobin 10/02/2024 14.3  12.0 - 15.9 g/dL Final    Hematocrit 10/02/2024 42.5  34.0 - 46.6 % Final    MCV 10/02/2024 95.3  79.0 - 97.0 fL Final    MCH 10/02/2024 32.1  26.6 - 33.0 pg Final    MCHC 10/02/2024 33.6  31.5 - 35.7 g/dL Final    RDW 10/02/2024 11.8 (L)  12.3 - 15.4 % Final    RDW-SD 10/02/2024 40.2  37.0 - 54.0 fl Final    MPV 10/02/2024 10.4  6.0 - 12.0 fL Final    Platelets 10/02/2024 264  140 - 450 10*3/mm3 Final      CTAP without contrast 8/3/2024  No acute intra-abdominal process.     EGD dated 6/2/2017 per Dr. Yovany Pacheco  - Erythematous distal esophagitis.     - Gastric polyp.    - Erythematous erosive gastritis.    - Small sliding hernia less than 3 cm.    - Duodenum second portion biopsy reveals benign small bowel mucosa with no diagnostic abnormality.  Antrum body biopsy reveals mild chronic gastritis.  Cardia polyp biopsy reveals polypoid fragment oxyntocardia-type mucosa showing chronic carditis.  Negative for intestinal metaplasia.  Cardia polypoid area biopsy reveals chronic carditis with lymphoid aggregate.  Scant reactive squamous epithelium.  Negative for intestinal metaplasia.     Assessment / Plan      1. Epigastric pain  2. Nausea  3. Melena  Concerns for peptic ulcer disease.  Patient denies NSAID use.  CTAP dated 8/3/2024 unremarkable.  No history of anemia 10/2/2024.  No recent labs.  CBC  Pantoprazole 40 mg 1 p.o. daily.  Zofran as needed for nausea.  Will schedule urgent EGD to rule out peptic ulcer disease.    - pantoprazole (PROTONIX) 40 MG EC tablet; 1 po daily in the am 30 minutes before breakfast  Dispense: 30 tablet; Refill: 3  - sodium chloride 0.9 % infusion  - CBC (No Diff); Future    4. Encounter for screening for malignant neoplasm of colon  She has not had a colonoscopy in the past. No family history of colon cancer.  Patient wishes to do Cologuard test at this time instead of a colonoscopy.    - Cologuard - Stool, Per Rectum; Future    Patient Instructions   Antireflux measures: Avoid fried, fatty foods, alcohol, chocolate, coffee, tea,  soft drinks, all carbonated beverages (including sparkling water), peppermint and spearmint, spicy foods, tomatoes and tomato based foods, onions, peppers, and garlic.   Other antireflux measures include weight reduction if overweight, avoiding tight clothing around the abdomen, elevating the head of the bed 6 inches with blocks under the head board, and don't drink or eat before going to bed and avoid lying down immediately after meals.  Pantoprazole 40 mg 1 by mouth in the am 30 minutes before  breakfast.  Zofran 4 mg 1 po every 8 hours as needed for nausea.  Avoid all NSAIDs, including Ibuprofen, Motrin, Advil, Aleve, Naprosyn, Mobic, Diclofenac, etc.   May use Tylenol/Acetaminophen products if needed.   CBC  Patient wishes to do Cologuard test instead of colonoscopy at this time.   Upper endoscopy-EGD: The indications, technique, alternatives and potential risk and complications were discussed with the patient including but not limited to bleeding, perforations, missing lesions and anesthetic complications. The patient understands and wishes to proceed with the procedure and has given their verbal consent. Written patient education information was given to the patient.   The patient will call if they have further questions before procedure.      JONI Booth  2/5/2025    Please note that portions of this note may have been completed with a voice recognition program.     Patient or patient representative verbalized consent for the use of Ambient Listening during the visit with  JONI Booth for chart documentation. 2/5/2025  10:39 EST

## 2025-02-06 ENCOUNTER — CLINICAL SUPPORT (OUTPATIENT)
Dept: INTERNAL MEDICINE | Facility: CLINIC | Age: 60
End: 2025-02-06
Payer: OTHER GOVERNMENT

## 2025-02-06 PROCEDURE — 96372 THER/PROPH/DIAG INJ SC/IM: CPT | Performed by: INTERNAL MEDICINE

## 2025-02-06 RX ADMIN — CYANOCOBALAMIN 1000 MCG: 1000 INJECTION, SOLUTION INTRAMUSCULAR; SUBCUTANEOUS at 15:48

## 2025-02-06 NOTE — PRE-PROCEDURE INSTRUCTIONS
PAT phone history completed with patient for upcoming procedure on 2/7/25 with Dr. Perez.    PAT PASS reviewed with patient and they verbalize understanding of the following:     Do not eat or drink anything after midnight the night before procedure unless otherwise instructed by physician/surgeon's office, this includes no gum, candy, mints, tobacco products or e-cigarettes.  Do not shave the area to be operated on at least 48 hours prior to procedure.  Do not wear makeup, lotion, hair products, or nail polish.  Do not wear any jewelry and remove all piercings.  Do not wear any adhesive if you wear dentures.  Do not wear contacts; bring in glasses if needed.  Only take medications on the morning of procedure as instructed by PAT nurse per anesthesia guidelines or as instructed by physician's office.  If you are on any blood thinners reach out to the physician/surgeon's office for instructions on when/if they will need to be stopped prior to procedure.  Bring in picture ID and insurance card, advanced directive copies if applicable, CPAP/BIPAP/Inhalers if indicated morning of procedure, leave any other valuables at home.  Ensure you have arranged for someone to drive you home the day of your procedure and someone to care for you at home afterwards. It is recommended that you do not drive, drink alcohol, or make any major legal decisions for at least 24 hours after your procedure is complete.  ERAS instructions given unless otherwise instructed per surgeon's orders.    Instructions given on hospital entrance and registration location.

## 2025-02-07 ENCOUNTER — HOSPITAL ENCOUNTER (OUTPATIENT)
Facility: HOSPITAL | Age: 60
Setting detail: HOSPITAL OUTPATIENT SURGERY
Discharge: HOME | End: 2025-02-07
Attending: INTERNAL MEDICINE | Admitting: INTERNAL MEDICINE
Payer: OTHER GOVERNMENT

## 2025-02-07 ENCOUNTER — ANESTHESIA (OUTPATIENT)
Dept: GASTROENTEROLOGY | Facility: HOSPITAL | Age: 60
End: 2025-02-07
Payer: OTHER GOVERNMENT

## 2025-02-07 ENCOUNTER — ANESTHESIA EVENT (OUTPATIENT)
Dept: GASTROENTEROLOGY | Facility: HOSPITAL | Age: 60
End: 2025-02-07
Payer: OTHER GOVERNMENT

## 2025-02-07 VITALS
OXYGEN SATURATION: 99 % | HEIGHT: 64 IN | DIASTOLIC BLOOD PRESSURE: 88 MMHG | WEIGHT: 118 LBS | HEART RATE: 73 BPM | RESPIRATION RATE: 16 BRPM | TEMPERATURE: 97 F | SYSTOLIC BLOOD PRESSURE: 127 MMHG | BODY MASS INDEX: 20.14 KG/M2

## 2025-02-07 DIAGNOSIS — K92.1 MELENA: ICD-10-CM

## 2025-02-07 DIAGNOSIS — R10.13 EPIGASTRIC PAIN: ICD-10-CM

## 2025-02-07 LAB
ERYTHROCYTE [DISTWIDTH] IN BLOOD BY AUTOMATED COUNT: 11.7 % (ref 12.3–15.4)
HCT VFR BLD AUTO: 40.5 % (ref 34–46.6)
HGB BLD-MCNC: 13.9 G/DL (ref 12–15.9)
MCH RBC QN AUTO: 32.6 PG (ref 26.6–33)
MCHC RBC AUTO-ENTMCNC: 34.3 G/DL (ref 31.5–35.7)
MCV RBC AUTO: 94.8 FL (ref 79–97)
PLATELET # BLD AUTO: 320 10*3/MM3 (ref 140–450)
RBC # BLD AUTO: 4.27 10*6/MM3 (ref 3.77–5.28)
WBC # BLD AUTO: 5.13 10*3/MM3 (ref 3.4–10.8)

## 2025-02-07 PROCEDURE — 25010000002 LIDOCAINE (CARDIAC): Performed by: NURSE ANESTHETIST, CERTIFIED REGISTERED

## 2025-02-07 PROCEDURE — 25010000002 PROPOFOL 10 MG/ML EMULSION: Performed by: NURSE ANESTHETIST, CERTIFIED REGISTERED

## 2025-02-07 PROCEDURE — 25010000002 ONDANSETRON PER 1 MG: Performed by: NURSE ANESTHETIST, CERTIFIED REGISTERED

## 2025-02-07 PROCEDURE — 88305 TISSUE EXAM BY PATHOLOGIST: CPT

## 2025-02-07 PROCEDURE — 43239 EGD BIOPSY SINGLE/MULTIPLE: CPT | Performed by: INTERNAL MEDICINE

## 2025-02-07 PROCEDURE — 25010000002 MIDAZOLAM PER 1MG: Performed by: NURSE ANESTHETIST, CERTIFIED REGISTERED

## 2025-02-07 PROCEDURE — 25010000002 FENTANYL CITRATE (PF) 50 MCG/ML SOLUTION PREFILLED SYRINGE: Performed by: NURSE ANESTHETIST, CERTIFIED REGISTERED

## 2025-02-07 RX ORDER — MIDAZOLAM HYDROCHLORIDE 2 MG/2ML
INJECTION, SOLUTION INTRAMUSCULAR; INTRAVENOUS AS NEEDED
Status: DISCONTINUED | OUTPATIENT
Start: 2025-02-07 | End: 2025-02-07 | Stop reason: SURG

## 2025-02-07 RX ORDER — FENTANYL CITRATE 50 UG/ML
INJECTION, SOLUTION INTRAMUSCULAR; INTRAVENOUS AS NEEDED
Status: DISCONTINUED | OUTPATIENT
Start: 2025-02-07 | End: 2025-02-07 | Stop reason: SURG

## 2025-02-07 RX ORDER — SODIUM CHLORIDE 9 MG/ML
70 INJECTION, SOLUTION INTRAVENOUS CONTINUOUS PRN
Status: DISCONTINUED | OUTPATIENT
Start: 2025-02-07 | End: 2025-02-07 | Stop reason: HOSPADM

## 2025-02-07 RX ORDER — ONDANSETRON 2 MG/ML
INJECTION INTRAMUSCULAR; INTRAVENOUS AS NEEDED
Status: DISCONTINUED | OUTPATIENT
Start: 2025-02-07 | End: 2025-02-07 | Stop reason: SURG

## 2025-02-07 RX ORDER — PROPOFOL 10 MG/ML
VIAL (ML) INTRAVENOUS AS NEEDED
Status: DISCONTINUED | OUTPATIENT
Start: 2025-02-07 | End: 2025-02-07 | Stop reason: SURG

## 2025-02-07 RX ADMIN — PROPOFOL 100 MG: 10 INJECTION, EMULSION INTRAVENOUS at 10:23

## 2025-02-07 RX ADMIN — MIDAZOLAM HYDROCHLORIDE 2 MG: 1 INJECTION, SOLUTION INTRAMUSCULAR; INTRAVENOUS at 10:25

## 2025-02-07 RX ADMIN — LIDOCAINE HYDROCHLORIDE 60 MG: 20 INJECTION, SOLUTION INTRAVENOUS at 10:23

## 2025-02-07 RX ADMIN — ONDANSETRON 4 MG: 2 INJECTION INTRAMUSCULAR; INTRAVENOUS at 10:18

## 2025-02-07 RX ADMIN — PROPOFOL 50 MG: 10 INJECTION, EMULSION INTRAVENOUS at 10:24

## 2025-02-07 RX ADMIN — FENTANYL CITRATE 50 MCG: 50 INJECTION, SOLUTION INTRAMUSCULAR; INTRAVENOUS at 10:23

## 2025-02-07 RX ADMIN — PROPOFOL 30 MG: 10 INJECTION, EMULSION INTRAVENOUS at 10:25

## 2025-02-07 NOTE — H&P
[facility]  HISTORY AND PHYSICAL    Patient Name: Ernestina Medina  : 1965  MRN: [id number]    Chief Complaint:   For EGD    History Of Presenting Illness:    Epigastric pain   Nausea   Melena    Past Medical History:   Diagnosis Date    Abdominal pain     Anxiety     Arthritis     Asthma     Brain concussion     Bronchitis     CTS (carpal tunnel syndrome)     Depression     Elbow pain     Endometriosis     Fatigue     H/O seasonal allergies     Hand fracture, right     Hard to intubate     subglottic stenosis    Herpes labialis     Hypertension     Joint pain of ankle and foot     Leukopenia     Migraine     Ovarian cyst     Pain in left knee     Pain in limb     Paresthesia     Pernicious anemia     Plantar fasciitis     Polycystic ovarian syndrome     PONV (postoperative nausea and vomiting)     Problems with swallowing     in the past    Recurrent aphthous stomatitis     Subglottic stenosis     states from being intubated    Tattoo     Vitamin B12 deficiency     Wrist fracture, right        Past Surgical History:   Procedure Laterality Date    BREAST SURGERY Bilateral 2009    augmentation    BUNIONECTOMY Right 2021    Procedure: FIRST METATARSAL OSTEOTOMY  BUNIONECTOMY RIGHT FOOT WITH POSSIBLE ARTHROSURFACE JOINT REPLACEMENT RIGHT FOOT;  Surgeon: Prateek Martinez DPM;  Location: [facility] OR;  Service: Podiatry;  Laterality: Right;    CHOLECYSTECTOMY      ENDOSCOPY N/A 2017    Procedure: ESOPHAGOGASTRODUODENOSCOPY WITH BIOPSIES AND COLD BIOPSY POLYPECTOMY;  Surgeon: Yovany Pacheco MD;  Location: [facility] ENDOSCOPY;  Service:     EPIDURAL BLOCK      FOOT SURGERY Right     HYSTERECTOMY      OTHER SURGICAL HISTORY      CYST FROM LEFT OVARY    SKIN BIOPSY      UPPER GASTROINTESTINAL ENDOSCOPY  2017    WISDOM TOOTH EXTRACTION         Social History     Socioeconomic History    Marital status:    Tobacco Use    Smoking status: Never      Passive exposure: Never    Smokeless tobacco: Never   Vaping Use    Vaping status: Never Used   Substance and Sexual Activity    Alcohol use: Yes     Alcohol/week: 6.0 standard drinks of alcohol     Types: 6 Glasses of wine per week    Drug use: Never    Sexual activity: Defer       Family History   Problem Relation Age of Onset    Arthritis Mother     Hypertension Mother     Migraines Mother     Depression Mother     Hyperlipidemia Mother     Stroke Father     Heart attack Father     Heart disease Father     Vision loss Father     Multiple sclerosis Father     Dementia Father     Stroke Brother     Cancer Maternal Aunt     Anxiety disorder Daughter     Developmental Disability Daughter     Learning disabilities Daughter     Asthma Daughter     Hypertension Other         sibling    Colon cancer Neg Hx        Prior to Admission Medications:  Facility-Administered Medications Prior to Admission   Medication Dose Route Frequency Provider Last Rate Last Admin    cyanocobalamin injection 1,000 mcg  1,000 mcg Intramuscular Q28 Days Uma Anne MD   1,000 mcg at 02/06/25 1548     Medications Prior to Admission   Medication Sig Dispense Refill Last Dose/Taking    albuterol sulfate  (90 Base) MCG/ACT inhaler INHALE 1 PUFF BY MOUTH EVERY 6 HOURS AS NEEDED FOR WHEEZING 8.5 g 1 Past Month    ALPRAZolam (XANAX) 1 MG tablet Take 1 tablet by mouth 2 (Two) Times a Day As Needed for Anxiety. 60 tablet 4 2/6/2025    amLODIPine (NORVASC) 5 MG tablet TAKE 1 TABLET BY MOUTH DAILY 90 tablet 0 2/7/2025 Morning    amphetamine-dextroamphetamine (ADDERALL) 7.5 MG tablet Take 1 tablet by mouth Daily. (Patient taking differently: Take 1 tablet by mouth Daily As Needed.) 30 tablet 0 Past Week    amphetamine-dextroamphetamine XR (Adderall XR) 15 MG 24 hr capsule Take 1 capsule by mouth Every Morning 30 capsule 0 Past Week    gabapentin (NEURONTIN) 300 MG capsule Take 1 capsule by mouth 4 (Four) Times a Day. (Patient taking differently:  "Take 1 capsule by mouth 4 (Four) Times a Day As Needed.) 120 capsule 5 Past Week    Levomilnacipran HCl ER 80 MG capsule sustained-release 24 hr Take 80 mg by mouth Daily. 30 capsule 2 Past Week    MAGNESIUM PO Take 500 mg by mouth Daily.   Past Week    methocarbamol (ROBAXIN) 500 MG tablet Take 1 tablet by mouth 3 (Three) Times a Day. (Patient taking differently: Take 1 tablet by mouth 3 (Three) Times a Day As Needed.) 30 tablet 3 Taking Differently    ondansetron (ZOFRAN) 4 MG tablet Take 1 tablet by mouth Every 8 (Eight) Hours As Needed for Nausea or Vomiting. 30 tablet 1 Past Month    pantoprazole (PROTONIX) 40 MG EC tablet 1 po daily in the am 30 minutes before breakfast (Patient not taking: Reported on 2/6/2025) 30 tablet 3 More than a month       Allergies:  Allergies   Allergen Reactions    Compazine [Prochlorperazine] Other (See Comments)     \"IT MAKES MY JAW LOCK-UP\"    Buspar [Buspirone] Nausea And Vomiting    Mirapex [Pramipexole] Nausea And Vomiting    Requip [Ropinirole] Dizziness     Syncope ?        Vitals: Temp:  [97.1 °F (36.2 °C)] 97.1 °F (36.2 °C)  Heart Rate:  [79] 79  Resp:  [14] 14  BP: (138)/(91) 138/91    Review Of Systems:  Constitutional:  Negative for chills, fever, and unexpected weight change.  Respiratory:  Negative for cough, chest tightness, shortness of breath, and wheezing.  Cardiovascular:  Negative for chest pain, palpitations, and leg swelling.  Gastrointestinal:  Negative for abdominal distention, abdominal pain, nausea, vomiting.  Neurological:  Negative for weakness, numbness, and headaches.     Physical Exam:    General Appearance:  Alert, cooperative, in no acute distress.   Lungs:   Clear to auscultation, respirations regular, even and                 unlabored.   Heart:  Regular rhythm and normal rate.   Abdomen:   Normal bowel sounds, no masses, no organomegaly. Soft, nontender, nondistended   Neurologic: Alert and oriented x 3. Moves all four limbs equally   "     Assessment & Plan     Assessment:  Active Problems:    Epigastric pain    Melena      Plan: Esophagogastroduodenoscopy with possible biopsy, polypectomy, dilatation, endoscopic band ligation, ablation of arteriovenous malformations or control of bleeding (N/A)     Aidee Perez MD  2/7/2025

## 2025-02-07 NOTE — DISCHARGE INSTRUCTIONS
- Discharge patient to home (ambulatory).   - Low fiber small meals   - Follow an antireflux regimen.  - Review medications and consider reducing the dose or discontinue meds that could cause gastroparesis   - Low dose PPI   - Await pathology results.   - Return to my office in 8 weeks.     No pushing, pulling, tugging,  heavy lifting, or strenuous activity.  No major decision making, driving, or drinking alcoholic beverages for 24 hours. ( due to the medications you have  received)  Always use good hand hygiene/washing techniques.  NO driving while taking pain medications.    * if you have an incision:  Check your incision area every day for signs of infection.   Check for:  * more redness, swelling, or pain  *more fluid or blood  *warmth  *pus or bad smellTo assist you in voiding:  Drink plenty of fluids  Listen to running water while attempting to void.    If you are unable to urinate and you have an uncomfortable urge to void or it has been   6 hours since you were discharged, return to the Emergency Room

## 2025-02-07 NOTE — ANESTHESIA POSTPROCEDURE EVALUATION
Patient: Ernestina Mednia    Procedure Summary       Date: 02/07/25 Room / Location: [facility] ENDOSCOPY 2 / [facility] ENDOSCOPY    Anesthesia Start: 1012 Anesthesia Stop: 1033    Procedure: Esophagogastroduodenoscopy with biopsy (Esophagus) Diagnosis:       Epigastric pain      Melena      (Epigastric pain [R10.13])      (Melena [K92.1])    Surgeons: Aidee Perez MD Provider: Anthony Dorado CRNA    Anesthesia Type: MAC ASA Status: 3            Anesthesia Type: MAC    Vitals  No vitals data found for the desired time range.          Post Anesthesia Care and Evaluation    Patient location during evaluation: bedside  Patient participation: complete - patient participated  Level of consciousness: awake and alert  Pain score: 0  Pain management: satisfactory to patient    Airway patency: patent  Anesthetic complications: No anesthetic complications  PONV Status: none  Cardiovascular status: acceptable and stable  Respiratory status: acceptable  Hydration status: acceptable    Comments: Vitals signs as noted in nursing documentation as per protocol.

## 2025-02-07 NOTE — ANESTHESIA PREPROCEDURE EVALUATION
Anesthesia Evaluation     Patient summary reviewed and Nursing notes reviewed   history of anesthetic complications:  difficult airway  NPO Solid Status: > 8 hours  NPO Liquid Status: > 8 hours           Airway   Mallampati: II  TM distance: >3 FB  Neck ROM: full  Possible difficult intubation and Difficult intubation highly probable  Dental      Pulmonary - normal exam   (+) asthma,  Cardiovascular - normal exam    (+) hypertension well controlled less than 2 medications, hyperlipidemia      Neuro/Psych  GI/Hepatic/Renal/Endo      Musculoskeletal     Abdominal  - normal exam   Substance History      OB/GYN          Other                    Anesthesia Plan    ASA 3     MAC     intravenous induction     Anesthetic plan, risks, benefits, and alternatives have been provided, discussed and informed consent has been obtained with: patient.  Pre-procedure education provided  Plan discussed with CRNA.    CODE STATUS:
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
## 2025-02-10 LAB — REF LAB TEST METHOD: NORMAL

## 2025-03-06 ENCOUNTER — OFFICE VISIT (OUTPATIENT)
Dept: BEHAVIORAL HEALTH | Facility: CLINIC | Age: 60
End: 2025-03-06
Payer: OTHER GOVERNMENT

## 2025-03-06 ENCOUNTER — CLINICAL SUPPORT (OUTPATIENT)
Dept: INTERNAL MEDICINE | Facility: CLINIC | Age: 60
End: 2025-03-06
Payer: OTHER GOVERNMENT

## 2025-03-06 DIAGNOSIS — F33.1 MODERATE EPISODE OF RECURRENT MAJOR DEPRESSIVE DISORDER: Primary | ICD-10-CM

## 2025-03-06 DIAGNOSIS — E53.8 VITAMIN B12 DEFICIENCY: Primary | ICD-10-CM

## 2025-03-06 DIAGNOSIS — F43.9 TRAUMA AND STRESSOR-RELATED DISORDER: ICD-10-CM

## 2025-03-06 DIAGNOSIS — F41.1 GENERALIZED ANXIETY DISORDER: ICD-10-CM

## 2025-03-06 PROCEDURE — 96372 THER/PROPH/DIAG INJ SC/IM: CPT | Performed by: INTERNAL MEDICINE

## 2025-03-06 RX ADMIN — CYANOCOBALAMIN 1000 MCG: 1000 INJECTION, SOLUTION INTRAMUSCULAR; SUBCUTANEOUS at 14:41

## 2025-03-12 DIAGNOSIS — F51.04 INSOMNIA, PSYCHOPHYSIOLOGICAL: ICD-10-CM

## 2025-03-12 DIAGNOSIS — F41.9 ANXIETY: ICD-10-CM

## 2025-03-12 RX ORDER — ALPRAZOLAM 1 MG/1
1 TABLET ORAL 2 TIMES DAILY PRN
Qty: 60 TABLET | Refills: 0 | Status: SHIPPED | OUTPATIENT
Start: 2025-03-12

## 2025-03-13 ENCOUNTER — OFFICE VISIT (OUTPATIENT)
Dept: BEHAVIORAL HEALTH | Facility: CLINIC | Age: 60
End: 2025-03-13
Payer: OTHER GOVERNMENT

## 2025-03-13 VITALS
WEIGHT: 118.2 LBS | HEIGHT: 64 IN | BODY MASS INDEX: 20.18 KG/M2 | DIASTOLIC BLOOD PRESSURE: 80 MMHG | SYSTOLIC BLOOD PRESSURE: 126 MMHG

## 2025-03-13 DIAGNOSIS — F33.2 SEVERE EPISODE OF RECURRENT MAJOR DEPRESSIVE DISORDER, WITHOUT PSYCHOTIC FEATURES: Primary | ICD-10-CM

## 2025-03-13 DIAGNOSIS — F41.9 ANXIETY: ICD-10-CM

## 2025-03-13 DIAGNOSIS — F90.2 ADHD (ATTENTION DEFICIT HYPERACTIVITY DISORDER), COMBINED TYPE: ICD-10-CM

## 2025-03-13 RX ORDER — MONTELUKAST SODIUM 10 MG/1
1 TABLET ORAL NIGHTLY
COMMUNITY

## 2025-03-13 RX ORDER — DEXTROMETHORPHAN HYDROBROMIDE, BUPROPION HYDROCHLORIDE 105; 45 MG/1; MG/1
1 TABLET, MULTILAYER, EXTENDED RELEASE ORAL DAILY
Qty: 30 TABLET | Refills: 0 | COMMUNITY
Start: 2025-03-13 | End: 2025-04-12

## 2025-03-13 RX ORDER — LISDEXAMFETAMINE DIMESYLATE 30 MG/1
30 TABLET, CHEWABLE ORAL DAILY
Qty: 30 TABLET | Refills: 0 | Status: SHIPPED | OUTPATIENT
Start: 2025-03-13

## 2025-03-13 NOTE — PROGRESS NOTES
Follow Up Office Visit      Date: 2025   Patient Name: Ernestina Medina  : 1965   MRN: [id number]     Patient or patient representative verbalized consent for the use of Ambient Listening during the visit with  JONI Walker for chart documentation. 3/15/2025  15:56 EDT    Chief Complaint:  Ernestina Medina is a 59 y.o. female who is here today for follow up with medication management for depression, anxiety, and ADHD.    History of Present Illness  She reports no discernible change in her condition following the adjustment of her Fetzima dosage 8 weeks ago. However, she acknowledges an improvement in her ability to rise from bed, a task she previously found challenging. She has been making efforts to reduce her medication intake. She has been on ADHD medication but notes that her stomach issues predate this treatment. She has been attempting to wean herself off Xanax, reducing her intake from 2 pills daily to half a pill every other day. She expresses a desire to further reduce her Xanax intake to one pill daily.    She has been experiencing gastrointestinal issues, including delayed gastric emptying, which have led to feelings of fullness, nausea, and vomiting. A recent endoscopy revealed undigested food in her stomach, but no pancreatic abnormalities were detected. She has been prescribed a medication to be taken 30 minutes prior to her first meal of the day, a regimen she finds difficult to adhere to due to forgetfulness and impatience for her morning coffee. She is seeking a second opinion regarding her gastrointestinal symptoms, which she believes are not related to her use of Xanax, gabapentin, or Fetzima, given that the issues predate the administration of these medications. She has been advised to discontinue her other medications, a suggestion she finds unfeasible due to her back pain.    FAMILY HISTORY  Her daughter has significant pancreatic issues and has  to take medication.    MEDICATIONS  Current: Fetzima, Xanax, gabapentin, Adderall    Subjective     Review of Systems:   Review of Systems   Psychiatric/Behavioral:  Positive for depressed mood and stress.        Screening Scores:   PHQ-9: 17 (last visit, 19)  ISIS-7: 19 (last visit, 21)    Medications:     Current Outpatient Medications:     albuterol sulfate  (90 Base) MCG/ACT inhaler, INHALE 1 PUFF BY MOUTH EVERY 6 HOURS AS NEEDED FOR WHEEZING, Disp: 8.5 g, Rfl: 1    ALPRAZolam (XANAX) 1 MG tablet, TAKE 1 TABLET BY MOUTH TWICE DAILY AS NEEDED FOR ANXIETY, Disp: 60 tablet, Rfl: 0    amLODIPine (NORVASC) 5 MG tablet, TAKE 1 TABLET BY MOUTH DAILY, Disp: 90 tablet, Rfl: 0    gabapentin (NEURONTIN) 300 MG capsule, Take 1 capsule by mouth 4 (Four) Times a Day. (Patient taking differently: Take 1 capsule by mouth 4 (Four) Times a Day As Needed.), Disp: 120 capsule, Rfl: 5    Levomilnacipran HCl ER 80 MG capsule sustained-release 24 hr, Take 80 mg by mouth Daily., Disp: 30 capsule, Rfl: 2    MAGNESIUM PO, Take 500 mg by mouth Daily., Disp: , Rfl:     methocarbamol (ROBAXIN) 500 MG tablet, Take 1 tablet by mouth 3 (Three) Times a Day. (Patient taking differently: Take 1 tablet by mouth 3 (Three) Times a Day As Needed.), Disp: 30 tablet, Rfl: 3    montelukast (SINGULAIR) 10 MG tablet, Take 1 tablet by mouth Every Night., Disp: , Rfl:     ondansetron (ZOFRAN) 4 MG tablet, Take 1 tablet by mouth Every 8 (Eight) Hours As Needed for Nausea or Vomiting., Disp: 30 tablet, Rfl: 1    pantoprazole (PROTONIX) 40 MG EC tablet, 1 po daily in the am 30 minutes before breakfast, Disp: 30 tablet, Rfl: 3    Dextromethorphan-buPROPion ER (Auvelity)  MG tablet controlled-release, Take 1 tablet by mouth Daily for 30 days., Disp: 30 tablet, Rfl: 0    Lisdexamfetamine Dimesylate 30 MG chewable tablet, Chew 1 tablet Daily., Disp: 30 tablet, Rfl: 0    Current Facility-Administered Medications:     cyanocobalamin injection 1,000 mcg,  "1,000 mcg, Intramuscular, Q28 Days, Uma Anne MD, 1,000 mcg at 03/06/25 1441    Allergies:   Allergies   Allergen Reactions    Compazine [Prochlorperazine] Other (See Comments)     \"IT MAKES MY JAW LOCK-UP\"    Buspar [Buspirone] Nausea And Vomiting    Mirapex [Pramipexole] Nausea And Vomiting    Requip [Ropinirole] Dizziness     Syncope ?       Results Reviewed: n/a     The following portion of the patient's history were reviewed and updated appropriately: allergies, current and past medications, family history, medical history and social history.    Objective     Vital Signs:   Vitals:    03/13/25 1542   BP: 126/80   Weight: 53.6 kg (118 lb 3.2 oz)   Height: 162.6 cm (64\")     Body mass index is 20.29 kg/m².     Mental Status Exam:   MENTAL STATUS EXAM   General Appearance:  Cleanly groomed and dressed  Eye Contact:  Good eye contact  Attitude:  Cooperative  Motor Activity:  Normal gait, posture  Muscle Strength:  Normal  Speech:  Normal rate, tone, volume  Language:  Spontaneous  Mood and affect:  Normal, pleasant and depressed  Hopelessness:  Denies  Loneliness: Denies  Thought Process:  Logical  Associations/ Thought Content:  No delusions  Hallucinations:  None  Suicidal Ideations:  Not present  Homicidal Ideation:  Not present  Sensorium:  Alert and clear  Orientation:  Person, place, time and situation  Immediate Recall, Recent, and Remote Memory:  Intact  Attention Span/ Concentration:  Good  Fund of Knowledge:  Appropriate for age and educational level  Intellectual Functioning:  Average range  Insight:  Good  Judgement:  Good  Reliability:  Good  Impulse Control:  Good        SUICIDE RISK ASSESSMENT/CSSRS:  1. Does patient have thoughts of suicide? no  2. Does patient have intent for suicide? no  3. Does patient have a current plan for suicide? no  4. History of suicide attempts: no  5. Family history of suicide or attempts: no  6. History of violent behaviors towards others or property or thoughts of " committing suicide: no  7. History of sexual aggression toward others: no  8. Access to firearms or weapons: no    Labs Reviewed: n/a  UDS Reviewed: n/a  Chart since last visit reviewed: yes    Assessment / Plan    Quality Measures:  Tobacco cessation: Patient denies tobacco use. No tobacco cessation education necessary.    Depression (PHQ >9): Patient screened positive for depression with a PHQ score of 17. Follow up recommendations include medication management, suicide risk assessment, continued screening score monitoring and supportive care.    Medication Considerations:  Benzo: CSA up to date and on file  Stimulants: CSA up to date and on file   MONY reviewed and appropriate.     Risk Assessment: Risk of self-harm acutely is low. Risk of self-harm chronically is also low, but could be further elevated in the event of treatment noncompliance and/or AODA.    Visit Diagnosis/Orders Placed This Visit:  Diagnoses and all orders for this visit:    1. Severe episode of recurrent major depressive disorder, without psychotic features (Primary)  -     Dextromethorphan-buPROPion ER (Auvelity)  MG tablet controlled-release; Take 1 tablet by mouth Daily for 30 days.  Dispense: 30 tablet; Refill: 0    2. Anxiety    3. ADHD (attention deficit hyperactivity disorder), combined type  -     Lisdexamfetamine Dimesylate 30 MG chewable tablet; Chew 1 tablet Daily.  Dispense: 30 tablet; Refill: 0      Assessment & Plan  Depression.  A sample bottle of Auvelity has been provided, with instructions to discontinue use if it proves intolerable. If it is well-tolerated, she may continue its use. She has been advised to continue her Fetzima regimen for the time being, as it is compatible with Auvelity. She has been informed that she can request a refill of Auvelity once her supply is depleted.    Anxiety.  A refill for Xanax was sent yesterday and should be available for her.    ADHD.  A prescription for Vyvanse 30 mg has been  issued, with instructions to contact the office if any adverse effects are experienced. The regular Adderall and the afternoon dose will be discontinued and replaced with Vyvanse.    Follow-up  The patient will follow up in 8 weeks.        Any medications prescribed have been sent electronically to [facility] in [place].     Patient will continue supportive psychotherapy efforts and medications as indicated.  Discussed medication options and treatment plan of prescribed medication(s) as well as the risks, benefits, and potential side effects. Patient will contact this office, call 911 or present to the nearest emergency room should suicidal or homicidal ideations occur. Clinic will obtain release of information for current treatment team for continuity of care as needed. Patient ackowledged and verbally consented to continue with current treatment plan and was educated on the importance of compliance with treatment and follow-up appointments.           JONI Stephenson  [facility]    This is electronically signed by JONI Stephenson  03/13/2025 15:55 EDT

## 2025-03-20 ENCOUNTER — TELEPHONE (OUTPATIENT)
Dept: INTERNAL MEDICINE | Facility: CLINIC | Age: 60
End: 2025-03-20
Payer: OTHER GOVERNMENT

## 2025-03-20 ENCOUNTER — PRIOR AUTHORIZATION (OUTPATIENT)
Dept: BEHAVIORAL HEALTH | Facility: CLINIC | Age: 60
End: 2025-03-20
Payer: OTHER GOVERNMENT

## 2025-03-20 DIAGNOSIS — F90.2 ADHD (ATTENTION DEFICIT HYPERACTIVITY DISORDER), COMBINED TYPE: Primary | ICD-10-CM

## 2025-03-20 RX ORDER — DEXTROAMPHETAMINE SACCHARATE, AMPHETAMINE ASPARTATE, DEXTROAMPHETAMINE SULFATE AND AMPHETAMINE SULFATE 2.5; 2.5; 2.5; 2.5 MG/1; MG/1; MG/1; MG/1
10 TABLET ORAL 2 TIMES DAILY
Qty: 30 TABLET | Refills: 0 | Status: SHIPPED | OUTPATIENT
Start: 2025-03-20

## 2025-03-20 NOTE — TELEPHONE ENCOUNTER
Started on auvelity and has been breaking out in hives all over her body. Went to an urgent care and received a steroid shot. Says that is the only knew thing that has been introduced to her that she can think of that would cause hives.     Also, ADHD medication is a chewable (lisdexamfetamine 30 mg) and insurance isn't wanting to approve according to [facility]oseluis. She says it has said that in the ojseluis since it was prescribed (03/13/2025). She is completely out of her other ADHD medication that was prescribed to her before the chewable. Please advise.

## 2025-03-20 NOTE — TELEPHONE ENCOUNTER
PA for Vyvanse 30mg chewable tablets sent to insurance, awaiting approval.  Generic: (Key: [id number])  Brand: (Key: [id number])

## 2025-03-20 NOTE — TELEPHONE ENCOUNTER
Called patient back; she is not sure what is contributing to the rash on her body, but the only recent change has been the addition of Auvelity.  She did not take the medication this morning, but still has the rash.  Advised patient to continue holding the medication until further notice.  She is also requesting ADHD medication to hold her over until her Vyvanse is approved; sent a 15-day supply of Adderall 10 mg twice daily.

## 2025-03-31 ENCOUNTER — OFFICE VISIT (OUTPATIENT)
Dept: INTERNAL MEDICINE | Facility: CLINIC | Age: 60
End: 2025-03-31
Payer: OTHER GOVERNMENT

## 2025-03-31 VITALS
RESPIRATION RATE: 16 BRPM | SYSTOLIC BLOOD PRESSURE: 135 MMHG | HEIGHT: 64 IN | TEMPERATURE: 96.4 F | DIASTOLIC BLOOD PRESSURE: 94 MMHG | BODY MASS INDEX: 20.28 KG/M2 | HEART RATE: 95 BPM | WEIGHT: 118.8 LBS

## 2025-03-31 DIAGNOSIS — R21 RASH AND NONSPECIFIC SKIN ERUPTION: ICD-10-CM

## 2025-03-31 DIAGNOSIS — E53.8 VITAMIN B12 DEFICIENCY: ICD-10-CM

## 2025-03-31 DIAGNOSIS — E78.2 MIXED HYPERLIPIDEMIA: ICD-10-CM

## 2025-03-31 DIAGNOSIS — I10 BENIGN ESSENTIAL HYPERTENSION: Primary | ICD-10-CM

## 2025-03-31 DIAGNOSIS — R10.84 GENERALIZED ABDOMINAL PAIN: ICD-10-CM

## 2025-03-31 PROCEDURE — 99214 OFFICE O/P EST MOD 30 MIN: CPT | Performed by: INTERNAL MEDICINE

## 2025-03-31 NOTE — PROGRESS NOTES
"Chief Complaint  Hypertension, Hyperlipidemia, Abdominal Pain, and Rash    Subjective        Ernestina Medina presents to [facility]    HPI: Patient is here to follow up on the blood pressure  The patient is taking the blood pressure medications as prescribed and has had no side effects. The patient is also here to follow up on the cholesterol and is trying to follow a diet. The patient is  also here to follow up on thyroid and is  due to get lab work done .  The patient also needs refills on medications .  She complains of right abdomen and right thigh rash on 3-  and then onto her  left thigh and then her back and  it has never been, she was seen in the urgent care the next day and  was started on pepcid and oral steroids and got steroid shot , she had a similar episode on February which went away with topical steroid cream otc and it completely resolved on 3-  , she denies any exposure to anything new , she complains of abdomen pain and saw gi and underwent egd and was informed she has gastroparesis with gerd secondary to her gabapentin and adhd medications  Hyperlipidemia   Pertinent negatives include no chest pain or shortness of breath.   Hypertension   Pertinent negatives include no chest pain, palpitations or shortness of breath.    Objective   Vital Signs:  /94   Pulse 95   Temp 96.4 °F (35.8 °C) (Temporal)   Resp 16   Ht 162.6 cm (64\")   Wt 53.9 kg (118 lb 12.8 oz)   BMI 20.39 kg/m²   Estimated body mass index is 20.39 kg/m² as calculated from the following:    Height as of this encounter: 162.6 cm (64\").    Weight as of this encounter: 53.9 kg (118 lb 12.8 oz).    BMI is within normal parameters. No other follow-up for BMI required.      Physical Exam  Vitals and nursing note reviewed.   Constitutional:       General: She is not in acute distress.     Appearance: Normal appearance. She is not diaphoretic.   HENT:      Head: Normocephalic " and atraumatic.      Right Ear: External ear normal.      Left Ear: External ear normal.      Nose: Nose normal.   Eyes:      Extraocular Movements: Extraocular movements intact.      Conjunctiva/sclera: Conjunctivae normal.   Neck:      Trachea: Trachea normal.   Cardiovascular:      Rate and Rhythm: Normal rate and regular rhythm.      Heart sounds: Normal heart sounds.   Pulmonary:      Effort: Pulmonary effort is normal. No respiratory distress.      Breath sounds: Normal breath sounds.   Abdominal:      General: Abdomen is flat.   Musculoskeletal:      Cervical back: Neck supple.      Comments: Moves all limbs   Skin:     General: Skin is warm.      Findings: Rash present.   Neurological:      Mental Status: She is alert and oriented to person, place, and time.      Comments: No gross motor or sensory deficits        Result Review :  The following data was reviewed by: Uma Anne MD on 03/31/2025:  Common labs          10/2/2024    10:06 2/6/2025    15:36 3/31/2025    14:22   Common Labs   Glucose 88   89    BUN 16   12    Creatinine 0.67   0.65    Sodium 139   138    Potassium 4.0   4.5    Chloride 100   103    Calcium 10.1   9.3    Albumin 4.9   4.5    Total Bilirubin 0.4   0.5    Alkaline Phosphatase 115   85    AST (SGOT) 25   29    ALT (SGPT) 19   18    WBC 4.04  5.13  5.16    Hemoglobin 14.3  13.9  13.6    Hematocrit 42.5  40.5  43.1    Platelets 264  320  315    Total Cholesterol 228      Total Cholesterol   244    Triglycerides 48   61    HDL Cholesterol 94   111    LDL Cholesterol  126   123    Hemoglobin A1C 5.20                  Assessment and Plan   Diagnoses and all orders for this visit:    1. Benign essential hypertension (Primary)    2. Mixed hyperlipidemia  -     CBC (No Diff)  -     Comprehensive Metabolic Panel  -     Lipid Panel    3. Generalized abdominal pain  -     Ambulatory Referral to Gastroenterology    4. Rash and nonspecific skin eruption  -     Ambulatory Referral to  Dermatology    5. Vitamin B12 deficiency  -     Vitamin B12    Plan:  1.  Benign essential hypertension: Will continue current medication, low-sodium diet advised, Counseled to regularly check BP at home with goal averaging <130/80.   2.mixed hyperlipidemia: will obtain   fasting CMP and lipid panel.  Diet and exercise counseled,  Will continue current medications  3.  Abdominal pain: Refer to GI  4.  Rash: Refer to dermatology           Follow Up   Return in about 4 months (around 8/6/2025).  Patient was given instructions and counseling regarding her condition or for health maintenance advice. Please see specific information pulled into the AVS if appropriate.

## 2025-04-01 ENCOUNTER — OFFICE VISIT (OUTPATIENT)
Dept: BEHAVIORAL HEALTH | Facility: CLINIC | Age: 60
End: 2025-04-01
Payer: OTHER GOVERNMENT

## 2025-04-01 DIAGNOSIS — F33.1 MODERATE EPISODE OF RECURRENT MAJOR DEPRESSIVE DISORDER: Primary | ICD-10-CM

## 2025-04-01 DIAGNOSIS — F41.1 GENERALIZED ANXIETY DISORDER: ICD-10-CM

## 2025-04-01 DIAGNOSIS — F43.9 TRAUMA AND STRESSOR-RELATED DISORDER: ICD-10-CM

## 2025-04-01 DIAGNOSIS — F43.21 GRIEF: ICD-10-CM

## 2025-04-01 LAB
ALBUMIN SERPL-MCNC: 4.5 G/DL (ref 3.5–5.2)
ALBUMIN/GLOB SERPL: 1.8 G/DL
ALP SERPL-CCNC: 85 U/L (ref 39–117)
ALT SERPL-CCNC: 18 U/L (ref 1–33)
AST SERPL-CCNC: 29 U/L (ref 1–32)
BILIRUB SERPL-MCNC: 0.5 MG/DL (ref 0–1.2)
BUN SERPL-MCNC: 12 MG/DL (ref 6–20)
BUN/CREAT SERPL: 18.5 (ref 7–25)
CALCIUM SERPL-MCNC: 9.3 MG/DL (ref 8.6–10.5)
CHLORIDE SERPL-SCNC: 103 MMOL/L (ref 98–107)
CHOLEST SERPL-MCNC: 244 MG/DL (ref 0–200)
CO2 SERPL-SCNC: 23.3 MMOL/L (ref 22–29)
CREAT SERPL-MCNC: 0.65 MG/DL (ref 0.57–1)
EGFRCR SERPLBLD CKD-EPI 2021: 102.2 ML/MIN/1.73
ERYTHROCYTE [DISTWIDTH] IN BLOOD BY AUTOMATED COUNT: 11.9 % (ref 12.3–15.4)
GLOBULIN SER CALC-MCNC: 2.5 GM/DL
GLUCOSE SERPL-MCNC: 89 MG/DL (ref 65–99)
HCT VFR BLD AUTO: 43.1 % (ref 34–46.6)
HDLC SERPL-MCNC: 111 MG/DL (ref 40–60)
HGB BLD-MCNC: 13.6 G/DL (ref 12–15.9)
LDLC SERPL CALC-MCNC: 123 MG/DL (ref 0–100)
MCH RBC QN AUTO: 31 PG (ref 26.6–33)
MCHC RBC AUTO-ENTMCNC: 31.6 G/DL (ref 31.5–35.7)
MCV RBC AUTO: 98.2 FL (ref 79–97)
PLATELET # BLD AUTO: 315 10*3/MM3 (ref 140–450)
POTASSIUM SERPL-SCNC: 4.5 MMOL/L (ref 3.5–5.2)
PROT SERPL-MCNC: 7 G/DL (ref 6–8.5)
RBC # BLD AUTO: 4.39 10*6/MM3 (ref 3.77–5.28)
SODIUM SERPL-SCNC: 138 MMOL/L (ref 136–145)
TRIGL SERPL-MCNC: 61 MG/DL (ref 0–150)
VIT B12 SERPL-MCNC: 488 PG/ML (ref 211–946)
VLDLC SERPL CALC-MCNC: 10 MG/DL (ref 5–40)
WBC # BLD AUTO: 5.16 10*3/MM3 (ref 3.4–10.8)

## 2025-04-01 NOTE — TREATMENT PLAN
Multi-Disciplinary Problems (from Behavioral Health Treatment Plan)      Active Problems       Problem: Anxiety  Start Date: 01/09/25      Problem Details: The patient self-scales this problem as a 6 with 10 being the worst.          Goal Priority Start Date Expected End Date End Date    Patient will develop and implement behavioral and cognitive strategies to reduce anxiety and irrational fears. -- 01/09/25 09/30/25 --    Goal Details: Progress toward goal:  The patient self-scales their progress related to this goal as a 5 with 10 being the worst.        Goal Intervention Frequency Start Date End Date    Help patient explore past emotional issues in relation to present anxiety. PRN 01/09/25 --    Intervention Details: Duration of treatment until discharged.        Goal Intervention Frequency Start Date End Date    Help patient develop an awareness of their cognitive and physical responses to anxiety. PRN 01/09/25 --    Intervention Details: Duration of treatment until discharged.                Problem: Depression  Start Date: 01/09/25      Problem Details: The patient self-scales this problem as a 6 with 10 being the worst.          Goal Priority Start Date Expected End Date End Date    Patient will demonstrate the ability to initiate new constructive life skills outside of sessions on a consistent basis. -- 01/09/25 09/30/25 --    Goal Details: Progress toward goal:  The patient self-scales their progress related to this goal as a 5 with 10 being the worst.        Goal Intervention Frequency Start Date End Date    Assist patient in setting attainable activities of daily living goals. PRN 01/09/25 --      Goal Intervention Frequency Start Date End Date    Provide education about depression PRN 01/09/25 --    Intervention Details: Duration of treatment until discharged.        Goal Intervention Frequency Start Date End Date    Assist patient in developing healthy coping strategies. PRN 01/09/25 --    Intervention  Details: Duration of treatment until discharged.                Problem: Grief and Loss  Start Date: 01/09/25      Problem Details: The patient self-scales this problem as a 6 with 10 being the worst.          Goal Priority Start Date Expected End Date End Date    Patient will process feelings and identify and implement specific ways to facilitate the grieving process. -- 01/09/25 09/30/25 --    Goal Details: Progress toward goal:  The patient self-scales their progress related to this goal as a 5 with 10 being the worst.        Goal Intervention Frequency Start Date End Date    Assist patient in identifying and processing feelings about losses. PRN 01/09/25 --    Intervention Details: Duration of treatment until discharged.        Goal Intervention Frequency Start Date End Date    Identify ways to grieve effectively and utilize healthy support systems PRN 01/09/25 --    Intervention Details: Duration of treatment until discharged.                        Reviewed By       Gely Be [facility] 04/01/25 1128                     I have discussed and reviewed this treatment plan with the patient.
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
## 2025-04-15 DIAGNOSIS — J45.21 MILD INTERMITTENT ASTHMA WITH ACUTE EXACERBATION: ICD-10-CM

## 2025-04-15 DIAGNOSIS — I10 BENIGN ESSENTIAL HYPERTENSION: ICD-10-CM

## 2025-04-15 RX ORDER — ALBUTEROL SULFATE 90 UG/1
1 INHALANT RESPIRATORY (INHALATION) EVERY 6 HOURS PRN
Qty: 8.5 G | Refills: 1 | Status: SHIPPED | OUTPATIENT
Start: 2025-04-15

## 2025-04-15 RX ORDER — AMLODIPINE BESYLATE 5 MG/1
5 TABLET ORAL DAILY
Qty: 90 TABLET | Refills: 1 | Status: SHIPPED | OUTPATIENT
Start: 2025-04-15

## 2025-04-16 ENCOUNTER — TELEPHONE (OUTPATIENT)
Dept: INTERNAL MEDICINE | Facility: CLINIC | Age: 60
End: 2025-04-16
Payer: OTHER GOVERNMENT

## 2025-04-16 NOTE — TELEPHONE ENCOUNTER
PA was approved for name brand Vyvanse chewable, called pharmacy to to inform them and did go through. Pharmacy was out but should get the patient's rx in this Friday. Called and spoke with patient to inform her of medication approval and when pharmacy should have her medications.

## 2025-04-16 NOTE — TELEPHONE ENCOUNTER
Pt called stating insurance denied what was just sent by Anne-Marie. She would like to talk to her because it has been a hassle for her to try to get these meds. Pls advise    hair removal not indicated

## 2025-04-17 ENCOUNTER — OFFICE VISIT (OUTPATIENT)
Dept: BEHAVIORAL HEALTH | Facility: CLINIC | Age: 60
End: 2025-04-17
Payer: OTHER GOVERNMENT

## 2025-04-17 ENCOUNTER — PRIOR AUTHORIZATION (OUTPATIENT)
Dept: BEHAVIORAL HEALTH | Facility: CLINIC | Age: 60
End: 2025-04-17
Payer: OTHER GOVERNMENT

## 2025-04-17 DIAGNOSIS — F33.0 MILD EPISODE OF RECURRENT MAJOR DEPRESSIVE DISORDER: ICD-10-CM

## 2025-04-17 DIAGNOSIS — F41.1 GENERALIZED ANXIETY DISORDER: Primary | ICD-10-CM

## 2025-04-17 DIAGNOSIS — F43.9 TRAUMA AND STRESSOR-RELATED DISORDER: ICD-10-CM
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
## 2025-04-22 NOTE — PLAN OF CARE
The patient states that things have been going okay but she has just recently he is on March 17 broke out rashes all over her body.  The patient also was having some digestive issues.  Things have been going great with her boyfriend; the patient is able to look back on the things that she missed out on with her  towards the end of their relationship.

## 2025-05-01 ENCOUNTER — OFFICE VISIT (OUTPATIENT)
Dept: BEHAVIORAL HEALTH | Facility: CLINIC | Age: 60
End: 2025-05-01
Payer: OTHER GOVERNMENT

## 2025-05-01 DIAGNOSIS — F43.9 TRAUMA AND STRESSOR-RELATED DISORDER: ICD-10-CM

## 2025-05-01 DIAGNOSIS — F41.9 ANXIETY: ICD-10-CM

## 2025-05-01 DIAGNOSIS — F43.21 GRIEF: ICD-10-CM

## 2025-05-01 DIAGNOSIS — F32.1 CURRENT MODERATE EPISODE OF MAJOR DEPRESSIVE DISORDER WITHOUT PRIOR EPISODE: Primary | ICD-10-CM
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
## 2025-05-06 NOTE — PLAN OF CARE
The patient is dealing with certain circumstances like her goldendoodle, renovations, and work partner that are causing her a little bit of anxiety and depression.  Other than the situations, the patient is doing much better.  
No

## 2025-05-08 ENCOUNTER — TELEMEDICINE (OUTPATIENT)
Dept: BEHAVIORAL HEALTH | Facility: CLINIC | Age: 60
End: 2025-05-08
Payer: OTHER GOVERNMENT

## 2025-05-08 DIAGNOSIS — Z53.21 PATIENT LEFT WITHOUT BEING SEEN: Primary | ICD-10-CM
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
## 2025-05-15 NOTE — PLAN OF CARE
Pulmonary & Critical Care & Sleep Medicine    Patient: Nathan Fung               Sex: male           MRN: [id number]       YOB: 1957      Age:  63 year old       Assessment & RECOMMENDATIONS:   · Left upper lobe cavity vs infected bullous lesion. Quantiferon TB gold is positive, HIV is negative, Aspergillus studies are pending. Diagnostic bronchoscopy is scheduled for tomorrow 2:30 PM  · Severe paraseptal emphysema. High risk for spontaneous pneumothorax   · Hematoperitoneum. H&H are stable. Anticoagulants are on hold  · CKD  · A. Fib    Visit Vitals  /74 (BP Location: RUE - Right upper extremity)   Pulse (!) 121   Temp 97.3 °F (36.3 °C) (Axillary)   Resp (!) 25   Ht 6' 1\" (1.854 m)   Wt 63 kg (138 lb 14.2 oz)   SpO2 95%   BMI 18.32 kg/m²      Physical Exam:   Oxygen Therapy  O2 Device: None/Room air  HEENT: No DC or bleeding  Neck: supple  Chest: Decreased bilateral air entry without wheezing and without rales  HR: irregularly irregular   ABD: Abdomen soft  EXTR: No DVT signs. Edema is mild  NEURO: no new focal changes  MS: alert and oriented    Lab Results   Component Value Date    RAPH 7.38 08/22/2021    RAPCO2 26 (L) 08/22/2021    RAPO2 69 (L) 08/22/2021    RAHCO3 15 (L) 08/22/2021       Recent Labs   Lab 08/26/21  0519 08/25/21  0504 08/24/21  0314 08/23/21  0820 08/23/21  0614 08/22/21  0759 08/21/21  2307   SODIUM 138 139 138  --  137 137 139   POTASSIUM 4.6 5.0 5.1 5.1 5.2* 4.5 4.2   CHLORIDE 106 110* 107  --  107 104 104   CO2 25 20* 18*  --  16* 17* 25   BUN 28* 34* 36*  --  30* 21* 19   CREATININE 1.16 1.28* 1.47*  --  1.35* 1.25* 1.23*   GLUCOSE 115* 144* 138*  --  175* 197* 148*   ALBUMIN  --   --   --   --   --   --  2.9*   AST  --   --   --   --   --   --  34   GPT  --   --   --   --   --   --  44   ALKPT  --   --   --   --   --   --  181*   BILIRUBIN  --   --   --   --   --   --  0.8   MG 1.5* 1.7 1.9  --  1.8 1.8  --        Recent Labs     08/24/21 0314 08/25/21  0501  The patient is dealing with the death of her beloved dog, pain in her neck, and renovations to her home.  The patient has been utilizing her coping skills of gardening and planting to help her deal with these issues.   08/25/21  1804 08/26/21  0519   WBC 6.2 6.3  --  4.7    263  --  270   RBC 5.00 4.92  --  4.45*   HGB 12.9* 12.6* 11.9* 11.5*   HCT 39.7 40.4 37.4* 36.0*   MCV 79.4 82.1  --  80.9   MCH 25.8* 25.6*  --  25.8*   MCHC 32.5 31.2*  --  31.9*

## 2025-05-17 NOTE — PROGRESS NOTES
The patient left the office before care was provided and did not complete the visit  due to technical issues and difficulty connecting. .

## 2025-05-20 ENCOUNTER — OFFICE VISIT (OUTPATIENT)
Dept: BEHAVIORAL HEALTH | Facility: CLINIC | Age: 60
End: 2025-05-20
Payer: OTHER GOVERNMENT

## 2025-05-20 VITALS — BODY MASS INDEX: 20.06 KG/M2 | HEIGHT: 64 IN | WEIGHT: 117.5 LBS

## 2025-05-20 DIAGNOSIS — F90.2 ADHD (ATTENTION DEFICIT HYPERACTIVITY DISORDER), COMBINED TYPE: Primary | ICD-10-CM

## 2025-05-20 DIAGNOSIS — F41.9 ANXIETY: ICD-10-CM

## 2025-05-20 DIAGNOSIS — F33.1 MODERATE EPISODE OF RECURRENT MAJOR DEPRESSIVE DISORDER: ICD-10-CM

## 2025-05-20 DIAGNOSIS — F51.04 INSOMNIA, PSYCHOPHYSIOLOGICAL: ICD-10-CM

## 2025-05-20 PROCEDURE — 99214 OFFICE O/P EST MOD 30 MIN: CPT

## 2025-05-20 PROCEDURE — 96127 BRIEF EMOTIONAL/BEHAV ASSMT: CPT

## 2025-05-20 RX ORDER — DEXTROMETHORPHAN HYDROBROMIDE, BUPROPION HYDROCHLORIDE 105; 45 MG/1; MG/1
1 TABLET, MULTILAYER, EXTENDED RELEASE ORAL DAILY
Qty: 14 TABLET | Refills: 0 | COMMUNITY
Start: 2025-05-20 | End: 2025-06-03

## 2025-05-20 RX ORDER — LISDEXAMFETAMINE DIMESYLATE 40 MG/1
40 TABLET, CHEWABLE ORAL DAILY
Qty: 14 TABLET | Refills: 0 | Status: SHIPPED | OUTPATIENT
Start: 2025-05-20

## 2025-05-20 RX ORDER — ALPRAZOLAM 1 MG/1
1 TABLET ORAL 2 TIMES DAILY PRN
Qty: 60 TABLET | Refills: 0 | Status: SHIPPED | OUTPATIENT
Start: 2025-05-20

## 2025-05-20 RX ORDER — DEXTROMETHORPHAN HYDROBROMIDE, BUPROPION HYDROCHLORIDE 105; 45 MG/1; MG/1
1 TABLET, MULTILAYER, EXTENDED RELEASE ORAL DAILY
Qty: 30 TABLET | Refills: 2 | Status: SHIPPED | OUTPATIENT
Start: 2025-05-20
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
## 2025-05-29 ENCOUNTER — PRIOR AUTHORIZATION (OUTPATIENT)
Dept: BEHAVIORAL HEALTH | Facility: CLINIC | Age: 60
End: 2025-05-29
Payer: OTHER GOVERNMENT

## 2025-06-03 ENCOUNTER — OFFICE VISIT (OUTPATIENT)
Dept: BEHAVIORAL HEALTH | Facility: CLINIC | Age: 60
End: 2025-06-03
Payer: OTHER GOVERNMENT

## 2025-06-03 DIAGNOSIS — F90.2 ADHD (ATTENTION DEFICIT HYPERACTIVITY DISORDER), COMBINED TYPE: Primary | ICD-10-CM

## 2025-06-03 DIAGNOSIS — F33.0 MILD EPISODE OF RECURRENT MAJOR DEPRESSIVE DISORDER: ICD-10-CM

## 2025-06-03 DIAGNOSIS — F43.9 TRAUMA AND STRESSOR-RELATED DISORDER: ICD-10-CM

## 2025-06-06 ENCOUNTER — CLINICAL SUPPORT (OUTPATIENT)
Dept: INTERNAL MEDICINE | Facility: CLINIC | Age: 60
End: 2025-06-06
Payer: OTHER GOVERNMENT

## 2025-06-06 DIAGNOSIS — E53.8 B12 DEFICIENCY: Primary | ICD-10-CM

## 2025-06-06 DIAGNOSIS — F90.2 ADHD (ATTENTION DEFICIT HYPERACTIVITY DISORDER), COMBINED TYPE: ICD-10-CM

## 2025-06-06 RX ORDER — LISDEXAMFETAMINE DIMESYLATE 40 MG/1
40 TABLET, CHEWABLE ORAL DAILY
Qty: 30 TABLET | Refills: 0 | Status: SHIPPED | OUTPATIENT
Start: 2025-06-06

## 2025-06-06 RX ADMIN — CYANOCOBALAMIN 1000 MCG: 1000 INJECTION, SOLUTION INTRAMUSCULAR; SUBCUTANEOUS at 16:28

## 2025-06-06 NOTE — TELEPHONE ENCOUNTER
Incoming Refill Request      Medication requested (name and dose): LISDEXAMFETAMINE DIMESYLATE    [facility] where [facility] should be sent: [facility]    Additional details provided by patient: APPT 7/3    Best call back number: [phone]    Does the patient have less than a 3 day supply:  [x] Yes  [] No    Giovany Eldridge Rep  06/06/25, 09:08 EDT
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
## 2025-07-02 ENCOUNTER — OFFICE VISIT (OUTPATIENT)
Dept: NEUROLOGY | Facility: CLINIC | Age: 60
End: 2025-07-02
Payer: OTHER GOVERNMENT

## 2025-07-02 VITALS
OXYGEN SATURATION: 97 % | SYSTOLIC BLOOD PRESSURE: 138 MMHG | WEIGHT: 124 LBS | DIASTOLIC BLOOD PRESSURE: 88 MMHG | BODY MASS INDEX: 21.17 KG/M2 | HEIGHT: 64 IN | HEART RATE: 85 BPM | RESPIRATION RATE: 14 BRPM | TEMPERATURE: 98.2 F

## 2025-07-02 DIAGNOSIS — M54.16 LUMBAR BACK PAIN WITH RADICULOPATHY AFFECTING RIGHT LOWER EXTREMITY: ICD-10-CM

## 2025-07-02 DIAGNOSIS — M54.2 CHRONIC NECK PAIN: ICD-10-CM

## 2025-07-02 DIAGNOSIS — G89.29 CHRONIC NECK PAIN: ICD-10-CM

## 2025-07-02 DIAGNOSIS — R20.2 PARESTHESIA: ICD-10-CM

## 2025-07-02 DIAGNOSIS — G25.81 RESTLESS LEGS SYNDROME (RLS): Primary | ICD-10-CM

## 2025-07-02 RX ORDER — GABAPENTIN 600 MG/1
600 TABLET ORAL 4 TIMES DAILY
Qty: 120 TABLET | Refills: 5 | Status: SHIPPED | OUTPATIENT
Start: 2025-07-02

## 2025-07-02 NOTE — PROGRESS NOTES
Follow Up Office Visit      Patient Name: Ernestina Medina  : 1965   MRN: [id number]     Chief Complaint:    Chief Complaint   Patient presents with   • Follow-up     Lumbar pain  RLS    Pt reports that her pain has worsened. She did go to pain management.        History of Present Illness: Ernestina Medina is a 60 y.o. female who is here today to follow up with neurology for LBP, PN and RLS. She was last seen in clinic  ([facility]).     Disenchanted with Pain Mgmt  ([facility]) following bilateral radiofrequency ablations to cspine; she reports she was incapacitated from pain for > 6 weeks following procedure. Continues to struggles with neck and lower back pain. Has fallen from leg weakness; she feels her legs give out on her. No BB incontinence or saddle anesthesia. RLS symptoms are well controlled at this time with gabapentin.     Recent Imaging:      XR Spine Lumbar 10/24 + DDD  MRI Lumbar Spine  + Multilevel lumbar degenerative change with no areas of abnormal enhancement  EMG/NCS  + very mild sensorimotor primary demyelinating neuropathy affecting the right median nerve at or about the wrist  MRI Brain WO  - noncontributory   MRI Cervical Spine WO  + Mild annular disc bulge with mild bilateral neuroforaminal narrowing at C5-6     Pertinent Medical History: 1987 with TBI (?), Migraines, hyperlipidemia, B12 deficiency, GERD, pernicious anemia, anxiety, depression, ADHD, plantar fasciitis, neuralgia, asthma    Subjective      Review of Systems:   Review of Systems   Constitutional: Negative.    HENT: Negative.     Eyes: Negative.    Respiratory: Negative.     Cardiovascular: Negative.    Gastrointestinal: Negative.    Endocrine: Negative.    Genitourinary: Negative.    Musculoskeletal:  Positive for back pain and neck pain.   Skin: Negative.    Allergic/Immunologic: Negative.    Neurological:  Positive for numbness.        RLS   Hematological:  Negative.    Psychiatric/Behavioral: Negative.     All other systems reviewed and are negative.      I have reviewed and the following portions of the patient's history were updated as appropriate: past family history, past medical history, past social history, past surgical history and problem list.    Medications:     Current Outpatient Medications:   •  albuterol sulfate  (90 Base) MCG/ACT inhaler, INHALE 1 PUFF BY MOUTH EVERY 6 HOURS AS NEEDED FOR WHEEZING, Disp: 8.5 g, Rfl: 1  •  ALPRAZolam (XANAX) 1 MG tablet, Take 1 tablet by mouth 2 (Two) Times a Day As Needed for Anxiety. for anxiety, Disp: 60 tablet, Rfl: 0  •  amLODIPine (NORVASC) 5 MG tablet, TAKE 1 TABLET BY MOUTH DAILY, Disp: 90 tablet, Rfl: 1  •  Dextromethorphan-buPROPion ER (Auvelity)  MG tablet controlled-release, Take 1 tablet by mouth Daily., Disp: 30 tablet, Rfl: 2  •  gabapentin (NEURONTIN) 300 MG capsule, Take 1 capsule by mouth 4 (Four) Times a Day. (Patient taking differently: Take 1 capsule by mouth 4 (Four) Times a Day As Needed.), Disp: 120 capsule, Rfl: 5  •  Levomilnacipran HCl ER 80 MG capsule sustained-release 24 hr, Take 80 mg by mouth Daily., Disp: 90 capsule, Rfl: 1  •  Lisdexamfetamine Dimesylate 40 MG chewable tablet, Chew 1 tablet Daily, Disp: 30 tablet, Rfl: 0  •  MAGNESIUM PO, Take 500 mg by mouth Daily., Disp: , Rfl:   •  methocarbamol (ROBAXIN) 500 MG tablet, Take 1 tablet by mouth 3 (Three) Times a Day. (Patient taking differently: Take 1 tablet by mouth 3 (Three) Times a Day As Needed.), Disp: 30 tablet, Rfl: 3  •  montelukast (SINGULAIR) 10 MG tablet, Take 1 tablet by mouth Every Night., Disp: , Rfl:   •  ondansetron (ZOFRAN) 4 MG tablet, Take 1 tablet by mouth Every 8 (Eight) Hours As Needed for Nausea or Vomiting., Disp: 30 tablet, Rfl: 1  •  pantoprazole (PROTONIX) 40 MG EC tablet, 1 po daily in the am 30 minutes before breakfast, Disp: 30 tablet, Rfl: 3  •  gabapentin (Neurontin) 600 MG tablet, Take 1  "tablet by mouth 4 (Four) Times a Day., Disp: 120 tablet, Rfl: 5    Current Facility-Administered Medications:   •  cyanocobalamin injection 1,000 mcg, 1,000 mcg, Intramuscular, Q28 Days, Uma Anen MD, 1,000 mcg at 06/06/25 1628    Allergies:   Allergies   Allergen Reactions   • Compazine [Prochlorperazine] Other (See Comments)     \"IT MAKES MY JAW LOCK-UP\"   • Buspar [Buspirone] Nausea And Vomiting   • Mirapex [Pramipexole] Nausea And Vomiting   • Requip [Ropinirole] Dizziness     Syncope ?       Objective     Physical Exam:  Vital Signs:   Vitals:    07/02/25 1539   BP: 138/88   BP Location: Right arm   Patient Position: Sitting   Cuff Size: Adult   Pulse: 85   Resp: 14   Temp: 98.2 °F (36.8 °C)   TempSrc: Temporal   SpO2: 97%   Weight: 56.2 kg (124 lb)   Height: 162.6 cm (64.02\")   PainSc: 6    PainLoc: Neck     Body mass index is 21.27 kg/m².    Physical Exam  Vitals and nursing note reviewed.   Constitutional:       General: She is not in acute distress.     Appearance: Normal appearance.   HENT:      Head: Normocephalic.      Nose: Nose normal.      Mouth/Throat:      Mouth: Mucous membranes are moist.      Pharynx: Oropharynx is clear.   Eyes:      Extraocular Movements: Extraocular movements intact.      Conjunctiva/sclera: Conjunctivae normal.   Musculoskeletal:      Cervical back: Tenderness present. Decreased range of motion.   Skin:     General: Skin is warm and dry.      Capillary Refill: Capillary refill takes less than 2 seconds.   Neurological:      Mental Status: She is alert and oriented to person, place, and time.   Psychiatric:         Mood and Affect: Mood normal.         Behavior: Behavior normal.         Neurological Exam  Mental Status  Alert. Oriented to person, place, and time.    Cranial Nerves  CN III, IV, VI: Extraocular movements intact bilaterally.       Assessment / Plan      Assessment/Plan:   Diagnoses and all orders for this visit:    1. Restless legs syndrome (RLS) " (Primary)  -     gabapentin (Neurontin) 600 MG tablet; Take 1 tablet by mouth 4 (Four) Times a Day.  Dispense: 120 tablet; Refill: 5    2. Lumbar back pain with radiculopathy affecting right lower extremity  -     gabapentin (Neurontin) 600 MG tablet; Take 1 tablet by mouth 4 (Four) Times a Day.  Dispense: 120 tablet; Refill: 5  -     Ambulatory Referral to Pain Management    3. Paresthesia  -     gabapentin (Neurontin) 600 MG tablet; Take 1 tablet by mouth 4 (Four) Times a Day.  Dispense: 120 tablet; Refill: 5    4. Chronic neck pain  -     gabapentin (Neurontin) 600 MG tablet; Take 1 tablet by mouth 4 (Four) Times a Day.  Dispense: 120 tablet; Refill: 5  -     Ambulatory Referral to Pain Management         Follow Up:   Return in about 6 months (around 1/2/2026), or if symptoms worsen or fail to improve.    Anticipatory guidance and safety reviewed  Patient education provided  MONY #[id number] reviewed and appropriate  CDA-on file  Continue/ increase gabapentin 600 mg QID; SE reviewed  Pain Mgmt Referral for Lumbar Spine Pain, chronic neck pain - Dr Chiang for MILD  Declined offer of referral to PT/OT     RTC PRN or within 6 months or sooner with issues (may cancel FU if pain mgmt takes over script)    Rdaha Kinsey, DNP, APRN, FNP-C  [facility]

## 2025-07-03 ENCOUNTER — OFFICE VISIT (OUTPATIENT)
Dept: BEHAVIORAL HEALTH | Facility: CLINIC | Age: 60
End: 2025-07-03
Payer: OTHER GOVERNMENT

## 2025-07-03 VITALS
WEIGHT: 121.1 LBS | HEIGHT: 64 IN | SYSTOLIC BLOOD PRESSURE: 130 MMHG | BODY MASS INDEX: 20.67 KG/M2 | DIASTOLIC BLOOD PRESSURE: 84 MMHG

## 2025-07-03 DIAGNOSIS — F33.0 MILD EPISODE OF RECURRENT MAJOR DEPRESSIVE DISORDER: ICD-10-CM

## 2025-07-03 DIAGNOSIS — F41.1 GENERALIZED ANXIETY DISORDER: ICD-10-CM

## 2025-07-03 DIAGNOSIS — Z51.81 THERAPEUTIC DRUG MONITORING: ICD-10-CM

## 2025-07-03 DIAGNOSIS — F90.2 ADHD (ATTENTION DEFICIT HYPERACTIVITY DISORDER), COMBINED TYPE: Primary | ICD-10-CM

## 2025-07-03 DIAGNOSIS — F51.04 INSOMNIA, PSYCHOPHYSIOLOGICAL: ICD-10-CM

## 2025-07-03 RX ORDER — DEXTROAMPHETAMINE SACCHARATE, AMPHETAMINE ASPARTATE, DEXTROAMPHETAMINE SULFATE AND AMPHETAMINE SULFATE 2.5; 2.5; 2.5; 2.5 MG/1; MG/1; MG/1; MG/1
10 TABLET ORAL
Qty: 30 TABLET | Refills: 0 | Status: SHIPPED | OUTPATIENT
Start: 2025-07-03

## 2025-07-03 RX ORDER — ALPRAZOLAM 1 MG/1
1 TABLET ORAL 2 TIMES DAILY PRN
Qty: 60 TABLET | Refills: 0 | Status: SHIPPED | OUTPATIENT
Start: 2025-07-03

## 2025-07-03 RX ORDER — DEXTROAMPHETAMINE SACCHARATE, AMPHETAMINE ASPARTATE MONOHYDRATE, DEXTROAMPHETAMINE SULFATE AND AMPHETAMINE SULFATE 5; 5; 5; 5 MG/1; MG/1; MG/1; MG/1
20 CAPSULE, EXTENDED RELEASE ORAL EVERY MORNING
Qty: 30 CAPSULE | Refills: 0 | Status: SHIPPED | OUTPATIENT
Start: 2025-07-03

## 2025-07-03 NOTE — PROGRESS NOTES
Follow Up Office Visit      Date: 2025   Patient Name: Ernestina Medina  : 1965   MRN: [id number]     Patient or patient representative verbalized consent for the use of Ambient Listening during the visit with  JONI Walker for chart documentation. 7/3/2025  14:34 EDT    Chief Complaint:  Ernestina Medina is a 60 y.o. female who is here today for follow up with medication management for ADHD, depression, anxiety and insomnia.     History of Present Illness  She reports that her current medication, Vyvanse, is not effective in managing her symptoms. She experiences difficulty concentrating and expresses a preference for Adderall, which she believes was more beneficial. She recalls that the previous prescription of Adderall was at a lower dose. She prefers a medication regimen that allows for an additional dose if needed to enhance focus during evening work hours. She also mentions that her stomach condition has improved.  Additionally, the cost of Fetzima and Auvelity are problematic, and she wishes to return to previously tolerated Prozac.  She was on the medication years ago, and reports that it worked for a time.    Subjective     Review of Systems:   Review of Systems   Psychiatric/Behavioral:  Positive for decreased concentration, depressed mood and stress. The patient is nervous/anxious.        Screening Scores:   PHQ-9: 24 (last visit, 16)  ISIS-7: 21 (last visit, 19)    Medications:     Current Outpatient Medications:     albuterol sulfate  (90 Base) MCG/ACT inhaler, INHALE 1 PUFF BY MOUTH EVERY 6 HOURS AS NEEDED FOR WHEEZING, Disp: 8.5 g, Rfl: 1    ALPRAZolam (XANAX) 1 MG tablet, Take 1 tablet by mouth 2 (Two) Times a Day As Needed for Anxiety. for anxiety, Disp: 60 tablet, Rfl: 0    amLODIPine (NORVASC) 5 MG tablet, TAKE 1 TABLET BY MOUTH DAILY, Disp: 90 tablet, Rfl: 1    gabapentin (NEURONTIN) 300 MG capsule, Take 1 capsule by mouth 4 (Four) Times a  "Day. (Patient taking differently: Take 1 capsule by mouth 4 (Four) Times a Day As Needed.), Disp: 120 capsule, Rfl: 5    gabapentin (Neurontin) 600 MG tablet, Take 1 tablet by mouth 4 (Four) Times a Day., Disp: 120 tablet, Rfl: 5    MAGNESIUM PO, Take 500 mg by mouth Daily., Disp: , Rfl:     methocarbamol (ROBAXIN) 500 MG tablet, Take 1 tablet by mouth 3 (Three) Times a Day. (Patient taking differently: Take 1 tablet by mouth 3 (Three) Times a Day As Needed.), Disp: 30 tablet, Rfl: 3    montelukast (SINGULAIR) 10 MG tablet, Take 1 tablet by mouth Every Night., Disp: , Rfl:     ondansetron (ZOFRAN) 4 MG tablet, Take 1 tablet by mouth Every 8 (Eight) Hours As Needed for Nausea or Vomiting., Disp: 30 tablet, Rfl: 1    pantoprazole (PROTONIX) 40 MG EC tablet, 1 po daily in the am 30 minutes before breakfast, Disp: 30 tablet, Rfl: 3    amphetamine-dextroamphetamine (Adderall) 10 MG tablet, Take 1 tablet by mouth Every Afternoon., Disp: 30 tablet, Rfl: 0    amphetamine-dextroamphetamine XR (ADDERALL XR) 20 MG 24 hr capsule, Take 1 capsule by mouth Every Morning, Disp: 30 capsule, Rfl: 0    FLUoxetine (PROzac) 20 MG capsule, Take 1 capsule by mouth Daily., Disp: 90 capsule, Rfl: 1    Levomilnacipran HCl ER 20 MG capsule sustained-release 24 hr, Take 20 mg by mouth Daily., Disp: 30 capsule, Rfl: 0    Current Facility-Administered Medications:     cyanocobalamin injection 1,000 mcg, 1,000 mcg, Intramuscular, Q28 Days, Uma Anne MD, 1,000 mcg at 06/06/25 1628    Allergies:   Allergies   Allergen Reactions    Compazine [Prochlorperazine] Other (See Comments)     \"IT MAKES MY JAW LOCK-UP\"    Buspar [Buspirone] Nausea And Vomiting    Mirapex [Pramipexole] Nausea And Vomiting    Requip [Ropinirole] Dizziness     Syncope ?       Results Reviewed: n/a     The following portion of the patient's history were reviewed and updated appropriately: allergies, current and past medications, family history, medical history and social " "history.    Objective     Vital Signs:   Vitals:    07/03/25 1421   BP: 130/84   Weight: 54.9 kg (121 lb 1.6 oz)   Height: 162.6 cm (64.02\")     Body mass index is 20.77 kg/m².     Mental Status Exam:   MENTAL STATUS EXAM   General Appearance:  Cleanly groomed and dressed  Eye Contact:  Good eye contact  Attitude:  Cooperative  Motor Activity:  Normal gait, posture and fidgety  Muscle Strength:  Normal  Speech:  Normal rate, tone, volume  Language:  Spontaneous  Mood and affect:  Normal, pleasant, irritable, anxious and depressed  Hopelessness:  Denies  Loneliness: Denies  Thought Process:  Logical  Associations/ Thought Content:  No delusions  Hallucinations:  None  Suicidal Ideations:  Not present  Homicidal Ideation:  Not present  Sensorium:  Alert and clear  Orientation:  Person, place, time and situation  Immediate Recall, Recent, and Remote Memory:  Intact  Attention Span/ Concentration:  Easily distracted  Fund of Knowledge:  Appropriate for age and educational level  Intellectual Functioning:  Average range  Insight:  Good  Judgement:  Good  Reliability:  Good  Impulse Control:  Good        SUICIDE RISK ASSESSMENT/CSSRS:  1. Does patient have thoughts of suicide? no  2. Does patient have intent for suicide? no  3. Does patient have a current plan for suicide? no  4. History of suicide attempts: no  5. Family history of suicide or attempts: no  6. History of violent behaviors towards others or property or thoughts of committing suicide: no  7. History of sexual aggression toward others: no  8. Access to firearms or weapons: no    Labs Reviewed: n/a  UDS Reviewed: ordered today  Chart since last visit reviewed: yes    Assessment / Plan    Quality Measures:  Tobacco cessation: Patient denies tobacco use. No tobacco cessation education necessary.      Depression (PHQ >9): Patient screened positive for depression with a PHQ score of 24. Follow up recommendations include medication management, suicide risk " assessment, continued screening score monitoring and supportive care.     Medication Considerations:  Benzo: CSA up to date and on file   Stimulants: CSA up to date and on file    MONY reviewed and appropriate.     Risk Assessment: Risk of self-harm acutely is low. Risk of self-harm chronically is also low, but could be further elevated in the event of treatment noncompliance and/or AODA.    Impression/Formulation:  Patient appeared alert and oriented.  Patient is voluntarily seeking psychiatric care at [facility].  Patient is receptive to assistance with maintaining a stable lifestyle.  Conditions being treated include     ICD-10-CM ICD-9-CM   1. ADHD (attention deficit hyperactivity disorder), combined type  F90.2 314.01   2. Mild episode of recurrent major depressive disorder  F33.0 296.31   3. Generalized anxiety disorder  F41.1 300.02   4. Insomnia, psychophysiological  F51.04 307.42   5. Therapeutic drug monitoring  Z51.81 V58.83   .     Visit Diagnosis/Orders Placed This Visit:  Diagnoses and all orders for this visit:    1. ADHD (attention deficit hyperactivity disorder), combined type (Primary)  -     amphetamine-dextroamphetamine XR (ADDERALL XR) 20 MG 24 hr capsule; Take 1 capsule by mouth Every Morning  Dispense: 30 capsule; Refill: 0  -     amphetamine-dextroamphetamine (Adderall) 10 MG tablet; Take 1 tablet by mouth Every Afternoon.  Dispense: 30 tablet; Refill: 0    2. Mild episode of recurrent major depressive disorder  -     FLUoxetine (PROzac) 20 MG capsule; Take 1 capsule by mouth Daily.  Dispense: 90 capsule; Refill: 1  -     Levomilnacipran HCl ER 20 MG capsule sustained-release 24 hr; Take 20 mg by mouth Daily.  Dispense: 30 capsule; Refill: 0    3. Generalized anxiety disorder  -     FLUoxetine (PROzac) 20 MG capsule; Take 1 capsule by mouth Daily.  Dispense: 90 capsule; Refill: 1  -     Levomilnacipran HCl ER 20 MG capsule sustained-release 24 hr; Take 20 mg by mouth  Daily.  Dispense: 30 capsule; Refill: 0    4. Insomnia, psychophysiological  -     ALPRAZolam (XANAX) 1 MG tablet; Take 1 tablet by mouth 2 (Two) Times a Day As Needed for Anxiety. for anxiety  Dispense: 60 tablet; Refill: 0    5. Therapeutic drug monitoring  -     Compliance Drug Analysis, Ur - Urine, Clean Catch       Assessment & Plan  Content of Therapy:  During the session, the patient expressed dissatisfaction with her current ADHD medication, Vyvanse, and a preference to return to Adderall, which she found effective previously. She discussed her ongoing struggle with depression and anxiety, particularly the distressing urge to return home quickly when away. The patient also mentioned her interest in resuming therapy sessions with Eder. The conversation included discussions on medication management, including the transition from Vyvanse to Adderall, and a plan to taper off Fetzima while initiating Prozac.    Clinical Impression:  The patient appears to be experiencing significant symptoms of ADHD, depression, and anxiety. She reports that Vyvanse is not effective in managing her ADHD symptoms, leading to difficulties with concentration. Her depression and anxiety are contributing to a sense of urgency and discomfort when away from home, which has persisted for over a year. The patient is receptive to changing her medication regimen and resuming therapy, indicating a proactive approach to managing her mental health.    Therapeutic Intervention:  - Medication adjustment: Switching from Vyvanse to Adderall XR 20 mg in the morning with an additional 10 mg dose in the afternoon as needed.  - Initiating Prozac 20 mg daily for depression.  - Tapering off Fetzima with a structured plan: 40 mg daily for two weeks, followed by 20 mg daily for another two weeks before discontinuation.  - Continuing Auvility for a few weeks while starting Prozac, then discontinuing Auvility after an additional week.  - Providing a refill  for Xanax.    Plan:  - Prescribe Adderall XR 20 mg for morning use, with an additional 10 mg dose in the afternoon as needed.  - Discontinue Vyvanse.  - Prescribe Prozac 20 mg daily.  - Initiate tapering plan for Fetzima: current 80 mg prescription for the remainder of the bottle, then 40 mg daily for two weeks, followed by 20 mg daily for another one to two weeks before discontinuation.  - Discontinue Auvelity.  - Provide a refill for Xanax.    Follow-up:  - Follow-up appointment scheduled in 8 weeks to review medication changes and progress.    Notes & Risk Factors:  - The patient experiences significant anxiety when away from home, which has persisted for over a year.  - Protective factors include her proactive approach to medication management and interest in resuming therapy.    Any medications prescribed have been sent electronically to [facility] in [place].      Patient will continue supportive psychotherapy efforts and medications as indicated.  Discussed medication options and treatment plan of prescribed medication(s) as well as the risks, benefits, and potential side effects. Patient will contact this office, call 911 or present to the nearest emergency room should suicidal or homicidal ideations occur. Clinic will obtain release of information for current treatment team for continuity of care as needed. Patient ackowledged and verbally consented to continue with current treatment plan and was educated on the importance of compliance with treatment and follow-up appointments.           JONI Stephenson  [facility]    This is electronically signed by JONI Stephenson  07/03/2025 14:04 EDT

## 2025-07-07 ENCOUNTER — CLINICAL SUPPORT (OUTPATIENT)
Dept: INTERNAL MEDICINE | Facility: CLINIC | Age: 60
End: 2025-07-07
Payer: OTHER GOVERNMENT

## 2025-07-07 DIAGNOSIS — E53.8 COBALAMIN DEFICIENCY: Primary | ICD-10-CM

## 2025-07-07 PROCEDURE — 96372 THER/PROPH/DIAG INJ SC/IM: CPT | Performed by: INTERNAL MEDICINE

## 2025-07-07 RX ADMIN — CYANOCOBALAMIN 1000 MCG: 1000 INJECTION, SOLUTION INTRAMUSCULAR; SUBCUTANEOUS at 15:33

## 2025-07-10 LAB — DRUGS UR: NORMAL

## 2025-08-19 ENCOUNTER — OFFICE VISIT (OUTPATIENT)
Dept: BEHAVIORAL HEALTH | Facility: CLINIC | Age: 60
End: 2025-08-19
Payer: OTHER GOVERNMENT

## 2025-08-19 DIAGNOSIS — F41.9 ANXIETY: ICD-10-CM

## 2025-08-19 DIAGNOSIS — F90.2 ADHD (ATTENTION DEFICIT HYPERACTIVITY DISORDER), COMBINED TYPE: ICD-10-CM

## 2025-08-19 DIAGNOSIS — F33.1 MODERATE EPISODE OF RECURRENT MAJOR DEPRESSIVE DISORDER: Primary | ICD-10-CM

## 2025-08-19 DIAGNOSIS — F43.21 GRIEF: ICD-10-CM

## 2025-08-19 RX ORDER — DEXTROAMPHETAMINE SACCHARATE, AMPHETAMINE ASPARTATE MONOHYDRATE, DEXTROAMPHETAMINE SULFATE AND AMPHETAMINE SULFATE 5; 5; 5; 5 MG/1; MG/1; MG/1; MG/1
20 CAPSULE, EXTENDED RELEASE ORAL EVERY MORNING
Qty: 30 CAPSULE | Refills: 0 | Status: SHIPPED | OUTPATIENT
Start: 2025-08-19

## 2025-08-19 RX ORDER — DEXTROAMPHETAMINE SACCHARATE, AMPHETAMINE ASPARTATE, DEXTROAMPHETAMINE SULFATE AND AMPHETAMINE SULFATE 2.5; 2.5; 2.5; 2.5 MG/1; MG/1; MG/1; MG/1
10 TABLET ORAL
Qty: 30 TABLET | Refills: 0 | Status: SHIPPED | OUTPATIENT
Start: 2025-08-19

## 2025-08-20 ENCOUNTER — OFFICE VISIT (OUTPATIENT)
Dept: INTERNAL MEDICINE | Facility: CLINIC | Age: 60
End: 2025-08-20
Payer: OTHER GOVERNMENT

## 2025-08-20 VITALS
WEIGHT: 122.8 LBS | TEMPERATURE: 97.7 F | RESPIRATION RATE: 18 BRPM | OXYGEN SATURATION: 100 % | HEIGHT: 64 IN | HEART RATE: 79 BPM | DIASTOLIC BLOOD PRESSURE: 88 MMHG | BODY MASS INDEX: 20.96 KG/M2 | SYSTOLIC BLOOD PRESSURE: 134 MMHG

## 2025-08-20 DIAGNOSIS — M79.2 NEURALGIA: ICD-10-CM

## 2025-08-20 DIAGNOSIS — R29.898 WEAKNESS OF BOTH LOWER EXTREMITIES: ICD-10-CM

## 2025-08-20 DIAGNOSIS — E53.8 VITAMIN B12 DEFICIENCY: ICD-10-CM

## 2025-08-20 DIAGNOSIS — H53.9 VISION DISTURBANCE: ICD-10-CM

## 2025-08-20 DIAGNOSIS — F09 MILD COGNITIVE DISORDER: ICD-10-CM

## 2025-08-20 DIAGNOSIS — E78.2 MIXED HYPERLIPIDEMIA: ICD-10-CM

## 2025-08-20 DIAGNOSIS — I10 BENIGN ESSENTIAL HYPERTENSION: Primary | ICD-10-CM

## 2025-08-20 RX ORDER — CYCLOBENZAPRINE HCL 10 MG
10 TABLET ORAL 3 TIMES DAILY PRN
COMMUNITY
Start: 2025-07-10

## 2025-08-20 RX ORDER — CYANOCOBALAMIN 1000 UG/ML
1000 INJECTION, SOLUTION INTRAMUSCULAR; SUBCUTANEOUS
Status: SHIPPED | OUTPATIENT
Start: 2025-08-20

## 2025-08-20 RX ADMIN — CYANOCOBALAMIN 1000 MCG: 1000 INJECTION, SOLUTION INTRAMUSCULAR; SUBCUTANEOUS at 17:47

## 2025-08-21 LAB
ALBUMIN SERPL-MCNC: 5.1 G/DL (ref 3.8–4.9)
ALP SERPL-CCNC: 100 IU/L (ref 44–121)
ALT SERPL-CCNC: 39 IU/L (ref 0–32)
AST SERPL-CCNC: 36 IU/L (ref 0–40)
BILIRUB SERPL-MCNC: 0.7 MG/DL (ref 0–1.2)
BUN SERPL-MCNC: 14 MG/DL (ref 6–24)
BUN/CREAT SERPL: 22 (ref 9–23)
CALCIUM SERPL-MCNC: 10.2 MG/DL (ref 8.7–10.2)
CHLORIDE SERPL-SCNC: 98 MMOL/L (ref 96–106)
CHOLEST SERPL-MCNC: 281 MG/DL (ref 100–199)
CO2 SERPL-SCNC: 21 MMOL/L (ref 20–29)
CREAT SERPL-MCNC: 0.63 MG/DL (ref 0.57–1)
EGFRCR SERPLBLD CKD-EPI 2021: 103 ML/MIN/1.73
ERYTHROCYTE [DISTWIDTH] IN BLOOD BY AUTOMATED COUNT: 11.8 % (ref 11.7–15.4)
GLOBULIN SER CALC-MCNC: 2.6 G/DL (ref 1.5–4.5)
GLUCOSE SERPL-MCNC: 97 MG/DL (ref 70–99)
HCT VFR BLD AUTO: 42.6 % (ref 34–46.6)
HDLC SERPL-MCNC: 126 MG/DL
HGB BLD-MCNC: 13.9 G/DL (ref 11.1–15.9)
LDLC SERPL CALC-MCNC: 145 MG/DL (ref 0–99)
MCH RBC QN AUTO: 31.2 PG (ref 26.6–33)
MCHC RBC AUTO-ENTMCNC: 32.6 G/DL (ref 31.5–35.7)
MCV RBC AUTO: 96 FL (ref 79–97)
PLATELET # BLD AUTO: 323 X10E3/UL (ref 150–450)
POTASSIUM SERPL-SCNC: 4.5 MMOL/L (ref 3.5–5.2)
PROT SERPL-MCNC: 7.7 G/DL (ref 6–8.5)
RBC # BLD AUTO: 4.45 X10E6/UL (ref 3.77–5.28)
SODIUM SERPL-SCNC: 138 MMOL/L (ref 134–144)
TRIGL SERPL-MCNC: 66 MG/DL (ref 0–149)
TSH SERPL DL<=0.005 MIU/L-ACNC: 1.55 UIU/ML (ref 0.45–4.5)
VIT B12 SERPL-MCNC: 460 PG/ML (ref 232–1245)
VLDLC SERPL CALC-MCNC: 10 MG/DL (ref 5–40)
WBC # BLD AUTO: 5.1 X10E3/UL (ref 3.4–10.8)

## 2025-08-22 ENCOUNTER — APPOINTMENT (OUTPATIENT)
Dept: GENERAL RADIOLOGY | Facility: HOSPITAL | Age: 60
End: 2025-08-22
Payer: OTHER GOVERNMENT

## 2025-08-22 ENCOUNTER — HOSPITAL ENCOUNTER (EMERGENCY)
Facility: HOSPITAL | Age: 60
Discharge: HOME | End: 2025-08-23
Attending: STUDENT IN AN ORGANIZED HEALTH CARE EDUCATION/TRAINING PROGRAM
Payer: OTHER GOVERNMENT

## 2025-08-22 ENCOUNTER — APPOINTMENT (OUTPATIENT)
Dept: CT IMAGING | Facility: HOSPITAL | Age: 60
End: 2025-08-22
Payer: OTHER GOVERNMENT

## 2025-08-22 ENCOUNTER — APPOINTMENT (OUTPATIENT)
Dept: MRI IMAGING | Facility: HOSPITAL | Age: 60
End: 2025-08-22
Payer: OTHER GOVERNMENT

## 2025-08-23 ENCOUNTER — APPOINTMENT (OUTPATIENT)
Dept: MRI IMAGING | Facility: HOSPITAL | Age: 60
End: 2025-08-23
Payer: OTHER GOVERNMENT

## 2025-08-24 RX ORDER — AMLODIPINE BESYLATE 5 MG/1
5 TABLET ORAL DAILY
Qty: 90 TABLET | Refills: 1 | Status: SHIPPED | OUTPATIENT
Start: 2025-08-24

## 2025-08-27 ENCOUNTER — TELEMEDICINE (OUTPATIENT)
Age: 60
End: 2025-08-27
Payer: OTHER GOVERNMENT

## 2025-08-27 ENCOUNTER — OFFICE VISIT (OUTPATIENT)
Dept: INTERNAL MEDICINE | Facility: CLINIC | Age: 60
End: 2025-08-27
Payer: OTHER GOVERNMENT

## 2025-08-27 VITALS
BODY MASS INDEX: 20.14 KG/M2 | SYSTOLIC BLOOD PRESSURE: 143 MMHG | TEMPERATURE: 97.5 F | HEART RATE: 78 BPM | OXYGEN SATURATION: 100 % | WEIGHT: 118 LBS | DIASTOLIC BLOOD PRESSURE: 86 MMHG | RESPIRATION RATE: 18 BRPM | HEIGHT: 64 IN

## 2025-08-27 DIAGNOSIS — J30.1 ALLERGIC RHINITIS DUE TO POLLEN, UNSPECIFIED SEASONALITY: ICD-10-CM

## 2025-08-27 DIAGNOSIS — R40.4 TRANSIENT ALTERATION OF AWARENESS: Primary | ICD-10-CM

## 2025-08-27 DIAGNOSIS — G56.01 CARPAL TUNNEL SYNDROME OF RIGHT WRIST: ICD-10-CM

## 2025-08-27 DIAGNOSIS — F41.1 GENERALIZED ANXIETY DISORDER: ICD-10-CM

## 2025-08-27 DIAGNOSIS — M62.838 MUSCLE SPASM: ICD-10-CM

## 2025-08-27 DIAGNOSIS — F33.1 MODERATE EPISODE OF RECURRENT MAJOR DEPRESSIVE DISORDER: Primary | ICD-10-CM

## 2025-08-27 RX ORDER — MONTELUKAST SODIUM 10 MG/1
10 TABLET ORAL NIGHTLY
Qty: 90 TABLET | Refills: 1 | Status: SHIPPED | OUTPATIENT
Start: 2025-08-27 | End: 2025-08-27 | Stop reason: SDUPTHER

## 2025-08-27 RX ORDER — MONTELUKAST SODIUM 10 MG/1
10 TABLET ORAL NIGHTLY
Qty: 90 TABLET | Refills: 1 | Status: SHIPPED | OUTPATIENT
Start: 2025-08-27

## 2025-08-27 RX ORDER — METHOCARBAMOL 500 MG/1
500 TABLET, FILM COATED ORAL 3 TIMES DAILY PRN
Qty: 90 TABLET | Refills: 1 | Status: SHIPPED | OUTPATIENT
Start: 2025-08-27

## (undated) DEVICE — CONMED SCOPE SAVER BITE BLOCK, 20X27 MM: Brand: SCOPE SAVER

## (undated) DEVICE — BNDG ELAS ECON W/CLIP 4IN 5YD LF STRL

## (undated) DEVICE — NDL HYPO ECLPS SFTY 18G 1 1/2IN

## (undated) DEVICE — PK EXTRM LOWR 20

## (undated) DEVICE — IRRIGATOR BULB ASEPTO 60CC STRL

## (undated) DEVICE — FRCP BX RADJAW4 NDL 2.8 240 STD OG

## (undated) DEVICE — TBG PENCL TELESCP MEGADYNE SMOKE EVAC 10FT

## (undated) DEVICE — BNDG ESMARK 4IN 9FT LF STRL BLU

## (undated) DEVICE — Device

## (undated) DEVICE — K-WIRE, SINGLE TROCAR POINT
Type: IMPLANTABLE DEVICE | Site: FOOT | Status: NON-FUNCTIONAL
Removed: 2021-12-13

## (undated) DEVICE — ENDOGATOR AUXILIARY WATER JET CONNECTOR: Brand: ENDOGATOR

## (undated) DEVICE — SPNG GZ WOVN 4X4IN 12PLY 10/BX STRL

## (undated) DEVICE — HYBRID CO2 TUBING/CAP SET FOR OLYMPUS® SCOPES & CO2 SOURCE: Brand: ERBE

## (undated) DEVICE — SUT ETHIB EXCEL 2/0 V-7 30IN GRN X964H

## (undated) DEVICE — DRSNG WND GZ CURAD OIL EMULSION 3X3IN STRL

## (undated) DEVICE — ENDOSCOPY PORT CONNECTOR FOR OLYMPUS® SCOPES: Brand: ERBE

## (undated) DEVICE — VLV SXN AIR/H2O ORCAPOD3 1P/U STRL

## (undated) DEVICE — 2000CC GUARDIAN II: Brand: GUARDIAN

## (undated) DEVICE — SLV SCD CALF HEMOFORCE DVT THERP REPROC MD

## (undated) DEVICE — 3M™ STERI-DRAPE™ U-DRAPE 1015: Brand: STERI-DRAPE™

## (undated) DEVICE — BNDG GZ SOF-FORM CONFRM 3X75IN LF STRL

## (undated) DEVICE — SUT ETHLN 4/0 PS2 PLSTC 1667G

## (undated) DEVICE — SUT ETHIB TICRN SURGIDAC 2/0 CT2 30 BRN BX/36

## (undated) DEVICE — FRCP BIOP COLD ENDOJAW ALLGTR W/NDL 2.8X2300MM BLU

## (undated) DEVICE — DISPOSABLE TOURNIQUET CUFF SINGLE BLADDER, SINGLE PORT AND QUICK CONNECT CONNECTOR: Brand: COLOR CUFF

## (undated) DEVICE — NDL HYPO ECLPS SFTY 22G 1 1/2IN

## (undated) DEVICE — SYR LL TP 10ML STRL

## (undated) DEVICE — THIN OFFSET (9.0 X 0.38 X 25.0MM)

## (undated) DEVICE — GLV SURG SENSICARE ORTHO PF LF 8 STRL

## (undated) DEVICE — STCKNT IMPERV 12IN STRL

## (undated) DEVICE — ANTIBACTERIAL UNDYED BRAIDED (POLYGLACTIN 910), SYNTHETIC ABSORBABLE SUTURE: Brand: COATED VICRYL

## (undated) DEVICE — INTENDED FOR TISSUE SEPARATION, AND OTHER PROCEDURES THAT REQUIRE A SHARP SURGICAL BLADE TO PUNCTURE OR CUT.: Brand: BARD-PARKER ® CARBON RIB-BACK BLADES